# Patient Record
Sex: FEMALE | Race: BLACK OR AFRICAN AMERICAN | Employment: UNEMPLOYED | URBAN - METROPOLITAN AREA
[De-identification: names, ages, dates, MRNs, and addresses within clinical notes are randomized per-mention and may not be internally consistent; named-entity substitution may affect disease eponyms.]

---

## 2017-01-03 ENCOUNTER — ANTI-COAG VISIT (OUTPATIENT)
Dept: PHARMACY | Age: 37
End: 2017-01-03

## 2017-01-03 DIAGNOSIS — I26.99 PULMONARY EMBOLISM, OTHER: ICD-10-CM

## 2017-01-03 LAB
INR BLD: 1.3
PROTIME: 16 SECONDS

## 2017-01-03 RX ORDER — NITROFURANTOIN 25; 75 MG/1; MG/1
100 CAPSULE ORAL DAILY
COMMUNITY
End: 2019-02-21 | Stop reason: ALTCHOICE

## 2017-01-12 ENCOUNTER — TELEPHONE (OUTPATIENT)
Dept: PHARMACY | Age: 37
End: 2017-01-12

## 2017-01-16 ENCOUNTER — ANTI-COAG VISIT (OUTPATIENT)
Dept: PHARMACY | Age: 37
End: 2017-01-16

## 2017-01-16 DIAGNOSIS — I26.99 PULMONARY EMBOLISM, OTHER: ICD-10-CM

## 2017-01-16 LAB — INR BLD: 1.4

## 2017-01-27 ENCOUNTER — ANTI-COAG VISIT (OUTPATIENT)
Dept: PHARMACY | Age: 37
End: 2017-01-27

## 2017-01-27 LAB — INR BLD: 1.3

## 2017-01-31 ENCOUNTER — TELEPHONE (OUTPATIENT)
Dept: PHARMACY | Age: 37
End: 2017-01-31

## 2017-02-02 ENCOUNTER — ANTI-COAG VISIT (OUTPATIENT)
Dept: PHARMACY | Age: 37
End: 2017-02-02

## 2017-02-02 DIAGNOSIS — I26.99 OTHER PULMONARY EMBOLISM WITHOUT ACUTE COR PULMONALE, UNSPECIFIED CHRONICITY (HCC): ICD-10-CM

## 2017-02-02 LAB — INR BLD: 2.6

## 2017-02-15 ENCOUNTER — ANTI-COAG VISIT (OUTPATIENT)
Dept: PHARMACY | Age: 37
End: 2017-02-15

## 2017-02-15 DIAGNOSIS — I26.99 OTHER PULMONARY EMBOLISM WITHOUT ACUTE COR PULMONALE, UNSPECIFIED CHRONICITY (HCC): ICD-10-CM

## 2017-02-15 LAB
INR BLD: 1.1
PROTIME: 12.6 SECONDS

## 2017-02-16 ENCOUNTER — TELEPHONE (OUTPATIENT)
Dept: PHARMACY | Age: 37
End: 2017-02-16

## 2017-02-20 ENCOUNTER — ANTI-COAG VISIT (OUTPATIENT)
Dept: PHARMACY | Age: 37
End: 2017-02-20

## 2017-02-20 DIAGNOSIS — I26.99 OTHER PULMONARY EMBOLISM WITHOUT ACUTE COR PULMONALE, UNSPECIFIED CHRONICITY (HCC): ICD-10-CM

## 2017-02-20 LAB — INR BLD: 1.2

## 2017-03-03 ENCOUNTER — TELEPHONE (OUTPATIENT)
Dept: PHARMACY | Age: 37
End: 2017-03-03

## 2017-03-06 ENCOUNTER — TELEPHONE (OUTPATIENT)
Dept: PHARMACY | Age: 37
End: 2017-03-06

## 2017-03-06 RX ORDER — LINEZOLID 600 MG/1
600 TABLET, FILM COATED ORAL 2 TIMES DAILY
COMMUNITY
End: 2022-05-03

## 2017-03-07 ENCOUNTER — TELEPHONE (OUTPATIENT)
Dept: PHARMACY | Age: 37
End: 2017-03-07

## 2017-03-13 ENCOUNTER — ANTI-COAG VISIT (OUTPATIENT)
Dept: PHARMACY | Age: 37
End: 2017-03-13

## 2017-03-13 DIAGNOSIS — I26.99 OTHER PULMONARY EMBOLISM WITHOUT ACUTE COR PULMONALE, UNSPECIFIED CHRONICITY (HCC): ICD-10-CM

## 2017-03-13 LAB — INR BLD: 2

## 2017-03-23 ENCOUNTER — ANTI-COAG VISIT (OUTPATIENT)
Dept: PHARMACY | Age: 37
End: 2017-03-23

## 2017-03-23 ENCOUNTER — TELEPHONE (OUTPATIENT)
Dept: PHARMACY | Age: 37
End: 2017-03-23

## 2017-03-23 DIAGNOSIS — I26.99 OTHER PULMONARY EMBOLISM WITHOUT ACUTE COR PULMONALE, UNSPECIFIED CHRONICITY (HCC): ICD-10-CM

## 2017-03-23 LAB — INR BLD: 1.6

## 2017-03-27 ENCOUNTER — TELEPHONE (OUTPATIENT)
Dept: PHARMACY | Age: 37
End: 2017-03-27

## 2017-03-30 ENCOUNTER — TELEPHONE (OUTPATIENT)
Dept: PHARMACY | Age: 37
End: 2017-03-30

## 2017-04-19 ENCOUNTER — TELEPHONE (OUTPATIENT)
Dept: PHARMACY | Age: 37
End: 2017-04-19

## 2017-04-20 ENCOUNTER — TELEPHONE (OUTPATIENT)
Dept: PHARMACY | Age: 37
End: 2017-04-20

## 2017-04-24 ENCOUNTER — ANTI-COAG VISIT (OUTPATIENT)
Dept: PHARMACY | Age: 37
End: 2017-04-24

## 2017-04-24 DIAGNOSIS — I26.99 OTHER PULMONARY EMBOLISM WITHOUT ACUTE COR PULMONALE, UNSPECIFIED CHRONICITY (HCC): ICD-10-CM

## 2017-04-24 LAB — INR BLD: 1.5

## 2017-05-04 ENCOUNTER — ANTI-COAG VISIT (OUTPATIENT)
Dept: PHARMACY | Age: 37
End: 2017-05-04

## 2017-05-04 DIAGNOSIS — I26.99 OTHER PULMONARY EMBOLISM WITHOUT ACUTE COR PULMONALE, UNSPECIFIED CHRONICITY (HCC): ICD-10-CM

## 2017-05-04 LAB
INR BLD: 5.1
PROTIME: 61.6 SECONDS

## 2017-05-12 ENCOUNTER — ANTI-COAG VISIT (OUTPATIENT)
Dept: PHARMACY | Age: 37
End: 2017-05-12

## 2017-05-12 DIAGNOSIS — I26.99 OTHER PULMONARY EMBOLISM WITHOUT ACUTE COR PULMONALE, UNSPECIFIED CHRONICITY (HCC): ICD-10-CM

## 2017-05-17 ENCOUNTER — ANTI-COAG VISIT (OUTPATIENT)
Dept: PHARMACY | Age: 37
End: 2017-05-17

## 2017-05-17 DIAGNOSIS — I26.99 OTHER PULMONARY EMBOLISM WITHOUT ACUTE COR PULMONALE, UNSPECIFIED CHRONICITY (HCC): ICD-10-CM

## 2017-05-17 LAB
INR BLD: 2.1
PROTIME: 25.1 SECONDS

## 2017-06-12 ENCOUNTER — ANTI-COAG VISIT (OUTPATIENT)
Dept: PHARMACY | Age: 37
End: 2017-06-12

## 2017-06-12 DIAGNOSIS — I26.99 OTHER PULMONARY EMBOLISM WITHOUT ACUTE COR PULMONALE, UNSPECIFIED CHRONICITY (HCC): ICD-10-CM

## 2017-06-12 LAB — INR BLD: 2.5

## 2017-07-25 ENCOUNTER — TELEPHONE (OUTPATIENT)
Dept: PHARMACY | Age: 37
End: 2017-07-25

## 2017-08-03 ENCOUNTER — ANTI-COAG VISIT (OUTPATIENT)
Dept: PHARMACY | Age: 37
End: 2017-08-03

## 2017-08-03 DIAGNOSIS — I26.99 OTHER PULMONARY EMBOLISM WITHOUT ACUTE COR PULMONALE, UNSPECIFIED CHRONICITY (HCC): ICD-10-CM

## 2017-08-03 LAB
INR BLD: 4.8
PROTIME: 57.2 SECONDS

## 2017-08-14 ENCOUNTER — ANTI-COAG VISIT (OUTPATIENT)
Dept: PHARMACY | Age: 37
End: 2017-08-14

## 2017-08-14 DIAGNOSIS — I26.99 OTHER PULMONARY EMBOLISM WITHOUT ACUTE COR PULMONALE, UNSPECIFIED CHRONICITY (HCC): ICD-10-CM

## 2017-08-14 LAB — INR BLD: 3.8

## 2017-08-28 ENCOUNTER — TELEPHONE (OUTPATIENT)
Dept: PHARMACY | Age: 37
End: 2017-08-28

## 2017-08-30 ENCOUNTER — TELEPHONE (OUTPATIENT)
Dept: PHARMACY | Age: 37
End: 2017-08-30

## 2017-08-31 ENCOUNTER — ANTI-COAG VISIT (OUTPATIENT)
Dept: PHARMACY | Age: 37
End: 2017-08-31

## 2017-08-31 DIAGNOSIS — I26.99 OTHER PULMONARY EMBOLISM WITHOUT ACUTE COR PULMONALE, UNSPECIFIED CHRONICITY (HCC): ICD-10-CM

## 2017-08-31 LAB
INR BLD: 2.1
PROTIME: 25.1 SECONDS

## 2017-09-21 ENCOUNTER — TELEPHONE (OUTPATIENT)
Dept: PHARMACY | Age: 37
End: 2017-09-21

## 2017-09-22 ENCOUNTER — ANTI-COAG VISIT (OUTPATIENT)
Dept: PHARMACY | Age: 37
End: 2017-09-22

## 2017-09-22 LAB
INR BLD: 1.4
PROTIME: 16.4 SECONDS

## 2017-09-26 ENCOUNTER — TELEPHONE (OUTPATIENT)
Dept: PHARMACY | Age: 37
End: 2017-09-26

## 2017-10-02 ENCOUNTER — ANTI-COAG VISIT (OUTPATIENT)
Dept: PHARMACY | Age: 37
End: 2017-10-02

## 2017-10-02 LAB
INR BLD: 1.8
PROTIME: 21.6 SECONDS

## 2017-10-02 NOTE — MR AVS SNAPSHOT
After Visit Summary             Anastasiya Barron   10/2/2017 4:30 PM   Anti-coag visit    Description:  Female : 1980   Provider:  MAK Camargo [facility]   Department:  [address]/[employer]              Your Follow-Up and Future Appointments         Below is a list of your follow-up and future appointments. This may not be a complete list as you may have made appointments directly with providers that we are not aware of or your providers may have made some for you. Please call your providers to confirm appointments. It is important to keep your appointments. Please bring your current insurance card, photo ID, co-pay, and all medication bottles to your appointment. If self-pay, payment is expected at the time of service. Your To-Do List     Future Appointments Provider Department Dept Phone    10/16/2017 9:45 AM [facility] [phone]         Information from Your Visit        Department     Name Address Phone Fax    [address]/[facility] [facility] Sidra Blancas [address] [facility] [phone]      Anticoagulation Summary as of 10/2/2017              Today's INR 1. 8! Next INR check 10/16/2017      Description           Take 3.75 mg tonight then continue weekly dose of 2.5 mg (1 tablet)  and 3.75mg (1&1/2 tablets) all other days.       Vital Signs     Last Menstrual Period Smoking Status                2017 Never Smoker             Medications and Orders      Your Current Medications Are              linezolid (ZYVOX) 600 MG tablet Take 600 mg by mouth 2 times daily    nitrofurantoin, macrocrystal-monohydrate, (MACROBID) 100 MG capsule Take 100 mg by mouth daily    warfarin (COUMADIN) 2.5 MG tablet Take 2.5 mg by mouth See Admin Instructions Unsure of dose sequence    Mirabegron ER (MYRBETRIQ) 25 MG TB24 Take 1 tablet by mouth daily solifenacin (VESICARE) 5 MG tablet Take 5 mg by mouth daily    trospium (SANCTURA) 20 MG tablet Take 20 mg by mouth 2 times daily    cetirizine (ZYRTEC) 10 MG tablet Take 10 mg by mouth daily. oxyCODONE-acetaminophen (PERCOCET) 5-325 MG per tablet Take 1 tablet by mouth every 6 hours as needed. omeprazole (PRILOSEC) 20 MG capsule Take 20 mg by mouth daily. alendronate (FOSAMAX) 35 MG tablet Take 35 mg by mouth every 7 days. baclofen (LIORESAL) 10 MG tablet Take 10 mg by mouth 3 times daily. calcium-vitamin D (OSCAL-500) 500-200 MG-UNIT per tablet Take 1 tablet by mouth daily. ferrous sulfate 325 (65 FE) MG tablet Take 325 mg by mouth daily (with breakfast). famotidine (PEPCID) 20 MG tablet Take 40 mg by mouth daily     potassium chloride SA (K-DUR;KLOR-CON) 20 MEQ tablet Take 20 mEq by mouth daily     therapeutic multivitamin-minerals (THERAGRAN-M) tablet Take 1 tablet by mouth daily. warfarin (COUMADIN) 5 MG tablet Take 5 mg by mouth See Admin Instructions. 7.5mg on Mon and 5mg all other days       Allergies              Ambien [Zolpidem Tartrate]     Ampicillin     Bactrim     Morphine     Risperidone And Related     Vicodin [Hydrocodone-acetaminophen]     Zoloft       We Ordered/Performed the following           Protime-INR     Comments: This external order was created through the results console.          Result Summary for Protime-INR      Result Information     Status          Final result (Resulted: 10/2/2017  4:22 PM)           10/2/2017  4:22 PM      Component Results     Component Value Ref Range & Units Status    INR 1.8  Final    Protime 21.6 seconds Final               Additional Information        Basic Information     Date Of Birth Sex Race Ethnicity Preferred Language    1980 Female Black Non-/Non  English      Preventive Care        Date Due    HIV screening is recommended for all people regardless of risk factors aged 15-65 years at least once (lifetime) who have never been HIV tested. 9/27/1997    Tetanus Combination Vaccine (1 - Tdap) 9/27/2001    Pneumococcal Vaccine - Pneumovax for adults aged 19-64 years with: chronic heart disease, chronic lung disease, diabetes mellitus, alcoholism, chronic liver disease, or cigarette smoking. (1 of 1 - PPSV23) 9/27/2001    Pap Smear 9/27/2003    Yearly Flu Vaccine (1) 9/1/2017            MyChart Signup           [employer] allows you to send messages to your doctor, view your test results, renew your prescriptions, schedule appointments, view visit notes, and more. How Do I Sign Up? 1. In your Internet browser, go to [email or web address]  2. Click on the Sign Up Now link in the Sign In box. You will see the New Member Sign Up page. 3. Enter your [employer] Access Code exactly as it appears below. You will not need to use this code after youve completed the sign-up process. If you do not sign up before the expiration date, you must request a new code. [employer] Access Code: [email or web address]  Expires: 10/2/2017  4:34 PM    4. Enter your Social Security Number ([id number]) and Date of Birth (mm/dd/yyyy) as indicated and click Submit. You will be taken to the next sign-up page. 5. Create a [employer] ID. This will be your [employer] login ID and cannot be changed, so think of one that is secure and easy to remember. 6. Create a [employer] password. You can change your password at any time. 7. Enter your Password Reset Question and Answer. This can be used at a later time if you forget your password. 8. Enter your e-mail address. You will receive e-mail notification when new information is available in [address] 9. Click Sign Up. You can now view your medical record. Additional Information  If you have questions, please contact the physician practice where you receive care. Remember, [employer] is NOT to be used for urgent needs. For medical emergencies, dial 911. For questions regarding your [employer] account call [phone]. If you have a clinical question, please call your doctor's office. October 2017 Details    Anh Diaz Thu Fri Sat     1               2      3.75 mg   See details      3      3.75 mg         4      3.75 mg         5      3.75 mg         6      3.75 mg         7      3.75 mg           8      3.75 mg         9      2.5 mg         10      3.75 mg         11      3.75 mg         12      3.75 mg         13      3.75 mg         14      3.75 mg           15      3.75 mg         16      2.5 mg         17               18               19               20               21                 22               23               24               25               26               27               28                 29               30               31                    Date Details   10/02 This INR check       Date of next INR:  10/16/2017         How to take your warfarin dose              To take:  2.5 mg Take 1 of the 2.5 mg tablets. To take:  3.75 mg Take 1.5 of the 2.5 mg tablets.

## 2017-10-02 NOTE — PROGRESS NOTES
Ms. Yousif Frost is a 30 y.o. y/o female with history of DVT, PE in about 2010 who presents today for anticoagulation monitoring and adjustment. Pertinent PMH: DVT assoc with PICC line  Hx of multiple sclerosis,   Patient Reported Findings:  Yes     No  [x]   []       Patient verifies current dosing regimen as listed  [x]   []       S/S bleeding/bruising/swelling/SOB- Pt not feeling well   []   [x]       Blood in urine or stool   []   [x]       Procedures scheduled in the future at this time  []   [x]       Missed Dose  []   [x]       Extra Dose  [x]   []       Change in medications - was on Cipro twice daily when left , but has since completed therapy  []   [x]       Change in health/diet/appetite     []   [x]       Change in alcohol use  []   [x]       Change in activity  [x]   []       Hospital admission- was then transferred to  from [facility] d/t MD being at [facility]. Was released from hospital 9/24  [x]   []       Emergency department visit- was taken to the ER on 9/22 d/t complaints of abdominal pain at last visit. Unable to complete visit with [facility] clinic  []   [x]       Other complaints    Clinical Outcomes:  Yes     No  []   [x]       Major bleeding event  []   [x]       Thromboembolic event    Duration of warfarin Therapy: Indefinitely  INR Range:  2.0-3.0    INR 1.8 today  Since patient slightly subtherapeutic and scheduled to have lower dose today, take 3.75 mg tomorrow then continue weekly dose of 2.5 mg on Mon and 3.75 mg all other days.    Return to clinic in 2 weeks, 10/16    Referring PCP is Dr. Pramod Barfield results  INR (no units)   Date Value   10/02/2017 1.8   09/22/2017 1.40 (H)   09/22/2017 1.4   08/31/2017 2.1

## 2017-10-19 ENCOUNTER — TELEPHONE (OUTPATIENT)
Dept: PHARMACY | Age: 37
End: 2017-10-19

## 2017-10-20 ENCOUNTER — ANTI-COAG VISIT (OUTPATIENT)
Dept: PHARMACY | Age: 37
End: 2017-10-20

## 2017-10-20 LAB
INR BLD: 1.9
PROTIME: 22.4 SECONDS

## 2017-10-23 ENCOUNTER — TELEPHONE (OUTPATIENT)
Dept: PHARMACY | Age: 37
End: 2017-10-23

## 2017-10-23 NOTE — TELEPHONE ENCOUNTER
López Krystianas returned call. Stated that she was also prescribed med for pain. After checking chart again, patient was prescribed Pyridium. No interaction with warfarin. Explained to patient. She stated she was prescribed steroid pack but will only take if she has a reaction to Cipro since unsure if allergic. Advised patient that steroids will affect INR. If needs to take steroid, please call back to clinic and we will doses as needed. Patient verbalized understanding.

## 2017-10-31 ENCOUNTER — TELEPHONE (OUTPATIENT)
Dept: PHARMACY | Age: 37
End: 2017-10-31

## 2017-10-31 NOTE — TELEPHONE ENCOUNTER
----- Message from Memo Patricia sent at 10/31/2017  3:11 PM EDT -----  Contact: Patient  Please call patient @ [phone] she has been given a prescription for Diflucan, one tablet and was told it would interfere with her warfarin.

## 2017-11-01 ENCOUNTER — HOSPITAL ENCOUNTER (OUTPATIENT)
Dept: OTHER | Age: 37
Discharge: HOME | End: 2017-11-30

## 2017-11-13 ENCOUNTER — ANTI-COAG VISIT (OUTPATIENT)
Dept: PHARMACY | Age: 37
End: 2017-11-13

## 2017-11-13 LAB
INR BLD: 1.4
PROTIME: 16.4 SECONDS

## 2017-11-21 ENCOUNTER — TELEPHONE (OUTPATIENT)
Dept: PHARMACY | Age: 37
End: 2017-11-21

## 2017-11-21 NOTE — TELEPHONE ENCOUNTER
Aide called to cancel patient's appt. Requested a call back to reschedule. L/m for pt to call and reschedule.

## 2017-11-28 ENCOUNTER — TELEPHONE (OUTPATIENT)
Dept: PHARMACY | Age: 37
End: 2017-11-28

## 2017-11-28 NOTE — TELEPHONE ENCOUNTER
Patient was seen in the ED on 11/24. INR was subtherapeutic at 1.38. Spoke with caregiver Norberto Oleas today to dose patient until her next appointment which has been rescheduled for 12/4 (next Monday). Patient is to take 5 mg today and tomorrow only. Then resume normal dose of 3.75 mg daily except 2.5 mg Mon.      Rosa Paz PharmD, [facility].   PGY1 Pharmacy Resident

## 2017-12-01 ENCOUNTER — HOSPITAL ENCOUNTER (OUTPATIENT)
Dept: OTHER | Age: 37
Discharge: HOME | End: 2017-12-31

## 2017-12-04 ENCOUNTER — ANTI-COAG VISIT (OUTPATIENT)
Dept: PHARMACY | Age: 37
End: 2017-12-04

## 2017-12-04 LAB
INR BLD: 1.3
PROTIME: 15.1 SECONDS

## 2017-12-04 NOTE — PROGRESS NOTES
Ms. Kris Vides is a 30 y.o. y/o female with history of DVT, PE in about 2010 who presents today for anticoagulation monitoring and adjustment. Pertinent PMH: DVT assoc with PICC line  Hx of multiple sclerosis,   Patient Reported Findings:  Yes     No  [x]   []       Patient verifies current dosing regimen as listed  []   [x]       S/S bleeding/bruising/swelling/SOB  []   [x]       Blood in urine or stool   []   [x]       Procedures scheduled in the future at this time  []   [x]       Missed Dose  []   [x]       Extra Dose  [x]   []       Change in medications Currently finishing Macrobid  []   [x]       Change in health/diet/appetite   Had some broccoli for multiple days recently. Reminded the need for consistency in her diet. []   [x]       Change in alcohol use  []   [x]       Change in activity  []   [x]       Hospital admission  [x]   []       Emergency department visit on 11/24 for a UTI. INR was 1.38. She was instructed from this clinic to take 5mg x days then resume same weekly schedule and recheck the next week.    []   [x]       Other complaints     Clinical Outcomes:  Yes     No  []   [x]       Major bleeding event  []   [x]       Thromboembolic event    Duration of warfarin Therapy: Indefinitely  INR Range:  2.0-3.0    INR 1.3 today d/t broccoli for multiple days in a row  Take 5 mg today and tomorrow then increase weekly dose to 3.75mg daily(5% increase)  Return to clinic 9 days     Referring PCP is Dr. Leila White results  INR (no units)   Date Value   12/04/2017 1.3   11/24/2017 1.38 (H)   11/13/2017 1.4   10/20/2017 1.9

## 2017-12-13 ENCOUNTER — ANTI-COAG VISIT (OUTPATIENT)
Dept: PHARMACY | Age: 37
End: 2017-12-13

## 2017-12-13 DIAGNOSIS — I26.09 OTHER PULMONARY EMBOLISM WITH ACUTE COR PULMONALE, UNSPECIFIED CHRONICITY (HCC): ICD-10-CM

## 2017-12-13 LAB
INR BLD: 1.4
PROTIME: 17.2 SECONDS

## 2017-12-13 NOTE — PROGRESS NOTES
Ms. Natasha Mujica is a 30 y.o. y/o female with history of DVT, PE in about 2010 who presents today for anticoagulation monitoring and adjustment. Pertinent PMH: DVT assoc with PICC line  Hx of multiple sclerosis,   Patient Reported Findings:  Yes     No  [x]   []       Patient verifies current dosing regimen as listed  []   [x]       S/S bleeding/bruising/swelling/SOB  []   [x]       Blood in urine or stool   []   [x]       Procedures scheduled in the future at this time  []   [x]       Missed Dose  []   [x]       Extra Dose  []   [x]       Change in medications   []   [x]       Change in health/diet/appetite- Mixed veggies and corn everyday. []   [x]       Change in alcohol use  []   [x]       Change in activity  []   [x]       Hospital admission  []   [x]       Emergency department visit   []   [x]       Other complaints      Clinical Outcomes:  Yes     No  []   [x]       Major bleeding event  []   [x]       Thromboembolic event    Duration of warfarin Therapy: Indefinitely  INR Range:  2.0-3.0    INR 1.4 today   Take 5 mg today and tomorrow then continue weekly dose of 3.75mg daily. Patient has been supratherapuetic on doses higher than this as well as on this dose. Hesitant to change dose at this appointment d/t history. Will bolus for two days and evaluate in 9 days.     Referring PCP is Dr. Suraj Plaza results  INR (no units)   Date Value   12/04/2017 1.3   11/24/2017 1.38 (H)   11/13/2017 1.4   10/20/2017 1.9

## 2017-12-19 NOTE — TELEPHONE ENCOUNTER
Warfarin prescription phoned into [facility] [address] to [address] under Dr. Adams Kussmaul  Warfarin 2.5 mg tabs  Take 3.75mg daily  14 days   No refills

## 2017-12-22 ENCOUNTER — TELEPHONE (OUTPATIENT)
Dept: PHARMACY | Age: 37
End: 2017-12-22

## 2017-12-26 ENCOUNTER — ANTI-COAG VISIT (OUTPATIENT)
Dept: PHARMACY | Age: 37
End: 2017-12-26

## 2017-12-26 DIAGNOSIS — I26.09 OTHER PULMONARY EMBOLISM WITH ACUTE COR PULMONALE, UNSPECIFIED CHRONICITY (HCC): ICD-10-CM

## 2017-12-26 LAB
INR BLD: 1.3
PROTIME: 15.2 SECONDS

## 2018-01-01 ENCOUNTER — HOSPITAL ENCOUNTER (OUTPATIENT)
Dept: OTHER | Age: 38
Discharge: HOME | End: 2018-01-31

## 2018-01-02 ENCOUNTER — ANTI-COAG VISIT (OUTPATIENT)
Dept: PHARMACY | Age: 38
End: 2018-01-02

## 2018-01-02 DIAGNOSIS — I26.09 OTHER PULMONARY EMBOLISM WITH ACUTE COR PULMONALE, UNSPECIFIED CHRONICITY (HCC): ICD-10-CM

## 2018-01-02 LAB
INR BLD: 1.1
PROTIME: 13.1 SECONDS

## 2018-01-02 NOTE — PROGRESS NOTES
Ms. Steph Fierro is a 30 y.o. y/o female with history of DVT, PE in about 2010 who presents today for anticoagulation monitoring and adjustment. Pertinent PMH: DVT assoc with PICC line  Hx of multiple sclerosis,   Patient Reported Findings:  Yes     No  [x]   []       Patient verifies current dosing regimen as listed  []   [x]       S/S bleeding/bruising/swelling/SOB  []   [x]       Blood in urine or stool   []   [x]       Procedures scheduled in the future at this time  []   [x]       Missed Dose  []   [x]       Extra Dose  []   [x]       Change in medications   []   [x]       Change in health/diet/appetite- Mixed veggies and corn everyday. []   [x]       Change in alcohol use  []   [x]       Change in activity  []   [x]       Hospital admission  []   [x]       Emergency department visit   []   [x]       Other complaints      Clinical Outcomes:  Yes     No  []   [x]       Major bleeding event  []   [x]       Thromboembolic event    Duration of warfarin Therapy: Indefinitely  INR Range:  2.0-3.0     BP 95/65  out of right arm     INR 1.1 today adamantly denies missing doses  Since patient subtherapeutic, patient will need to begin Lovenox injections until therapeutic. Patient extremely noncompliant to clinic. Take 5 mg x 3 days then continue weekly dose of 3.75mg daily. Patient has been supratherapuetic on doses higher than this as well as on this dose. Hesitant to change dose at this appointment d/t history. Will bolus for 3 days.  Recheck INR on Mon 1/8    Referring PCP is Dr. Hiral Rodriguez results  INR (no units)   Date Value   01/02/2018 1.1   12/26/2017 1.3   12/13/2017 1.4   12/04/2017 1.3

## 2018-01-03 ENCOUNTER — TELEPHONE (OUTPATIENT)
Dept: PHARMACY | Age: 38
End: 2018-01-03

## 2018-01-10 ENCOUNTER — ANTI-COAG VISIT (OUTPATIENT)
Dept: PHARMACY | Age: 38
End: 2018-01-10

## 2018-01-10 DIAGNOSIS — I26.09 OTHER PULMONARY EMBOLISM WITH ACUTE COR PULMONALE, UNSPECIFIED CHRONICITY (HCC): ICD-10-CM

## 2018-01-10 LAB
INR BLD: 1.7
PROTIME: 20.2 SECONDS

## 2018-01-10 NOTE — PROGRESS NOTES
Ms. Ivonne Leal is a 30 y.o. y/o female with history of DVT, PE in about 2010 who presents today for anticoagulation monitoring and adjustment. Pertinent PMH: DVT assoc with PICC line  Hx of multiple sclerosis,   Patient Reported Findings:  Yes     No  [x]   []       Patient verifies current dosing regimen as listed  []   [x]       S/S bleeding/bruising/swelling/SOB  []   [x]       Blood in urine or stool   []   [x]       Procedures scheduled in the future at this time  []   [x]       Missed Dose  []   [x]       Extra Dose  []   [x]       Change in medications   []   [x]       Change in health/diet/appetite- Mixed veggies and peas 6 out of 7 days per week  []   [x]       Change in alcohol use  []   [x]       Change in activity  []   [x]       Hospital admission  []   [x]       Emergency department visit   []   [x]       Other complaints      Clinical Outcomes:  Yes     No  []   [x]       Major bleeding event  []   [x]       Thromboembolic event    Duration of warfarin Therapy: Indefinitely  INR Range:  2.0-3.0     BP 95/65  out of right arm     INR 1.7 today   Since patient subtherapeutic, patient will continue Lovenox injections until therapeutic. Take 5mg today then increase weekly dose to 5mg on Sat and 3.75mg all other days  Patient hs been extremely noncompliant to clinic. Patient has been supratherapuetic on doses higher than this as well as on this dose. Will cautiously change dose at this appointment.    Recheck INR in 5 days    Referring PCP is Dr. Ekta Marion results  INR (no units)   Date Value   01/10/2018 1.7   01/02/2018 1.1   12/26/2017 1.3   12/13/2017 1.4

## 2018-01-16 ENCOUNTER — TELEPHONE (OUTPATIENT)
Dept: PHARMACY | Age: 38
End: 2018-01-16

## 2018-01-18 ENCOUNTER — ANTI-COAG VISIT (OUTPATIENT)
Dept: PHARMACY | Age: 38
End: 2018-01-18

## 2018-01-18 DIAGNOSIS — I26.09 OTHER PULMONARY EMBOLISM WITH ACUTE COR PULMONALE, UNSPECIFIED CHRONICITY (HCC): ICD-10-CM

## 2018-01-18 LAB
INR BLD: 1
PROTIME: 11.5 SECONDS

## 2018-01-30 NOTE — TELEPHONE ENCOUNTER
Patient called in AM & left a vm stating she had to see another MD, urgently and forgot to call and reschedule her appt from [facility]. 1/29. Called patient back to reschedule. She states she wants to see her ID MD 1st and will call us back this afternoon to reschedule her missed appt.

## 2018-02-01 ENCOUNTER — HOSPITAL ENCOUNTER (OUTPATIENT)
Dept: OTHER | Age: 38
Discharge: HOME | End: 2018-02-28

## 2018-02-01 NOTE — TELEPHONE ENCOUNTER
Called patient again about an appt. She did not call back on Monday as she said she would. Patient states they found a severe UTI that will require IV ABX and the doctor wanted her to go to the ED for admission to the hospital.  Her urologist wants to set up the ABX at home. She again states she will call with the decision on the ABX tomorrow and then she will know if she can schedule an appt with this clinic.

## 2018-02-05 ENCOUNTER — ANTI-COAG VISIT (OUTPATIENT)
Dept: PHARMACY | Age: 38
End: 2018-02-05

## 2018-02-05 DIAGNOSIS — I26.09 OTHER PULMONARY EMBOLISM WITH ACUTE COR PULMONALE, UNSPECIFIED CHRONICITY (HCC): ICD-10-CM

## 2018-02-05 LAB
INR BLD: 2.2
PROTIME: 26.6 SECONDS

## 2018-02-06 ENCOUNTER — TELEPHONE (OUTPATIENT)
Dept: PHARMACY | Age: 38
End: 2018-02-06

## 2018-02-26 ENCOUNTER — TELEPHONE (OUTPATIENT)
Dept: PHARMACY | Age: 38
End: 2018-02-26

## 2018-02-27 NOTE — TELEPHONE ENCOUNTER
Called patient. She scheduled an appt for Fri. 3/2. She will call back and schedule something earlier if she can.

## 2018-03-01 ENCOUNTER — HOSPITAL ENCOUNTER (OUTPATIENT)
Dept: OTHER | Age: 38
Discharge: HOME | End: 2018-03-31

## 2018-03-06 NOTE — TELEPHONE ENCOUNTER
Pt left v/m apologizing for missing appts, she stated her aide Philip Pedersen ) is unable to bring her to appt at this time d/t having car problems. Pt states we can call Pallavi Stock at [phone] if we need to speak w/ her to r/s appt, her car should be ready Thursday or Friday of this week . Silvia [phone].

## 2018-03-09 ENCOUNTER — ANTI-COAG VISIT (OUTPATIENT)
Dept: PHARMACY | Age: 38
End: 2018-03-09

## 2018-03-09 DIAGNOSIS — I26.09 OTHER PULMONARY EMBOLISM WITH ACUTE COR PULMONALE, UNSPECIFIED CHRONICITY (HCC): ICD-10-CM

## 2018-03-09 LAB
INR BLD: 1.9
PROTIME: 23.4 SECONDS

## 2018-03-09 NOTE — PROGRESS NOTES
Ms. Nasir Onofre is a 30 y.o. y/o female with history of DVT, PE in about 2010. She presents today for anticoagulation monitoring and adjustment. Pertinent PMH: DVT assoc with PICC line  Hx of multiple sclerosis,   Patient Reported Findings:  Yes     No  [x]   []       Patient verifies current dosing regimen as listed  []   [x]       S/S bleeding/bruising/swelling/SOB  []   [x]       Blood in urine or stool   []   [x]       Procedures scheduled in the future at this time  []   [x]       Missed Dose  []   [x]       Extra Dose  []   [x]       Change in medications   []   [x]       Change in health/diet/appetite  []   [x]       Change in alcohol use  []   [x]       Change in activity  []   [x]       Hospital admission  []   [x]       Emergency department visit   [x]   []       Other complaints   She c/o indigestion but caregiver suggests d/t anxiety. Clinical Outcomes:  Yes     No  []   [x]       Major bleeding event  []   [x]       Thromboembolic event    Duration of warfarin Therapy: Indefinitely  INR Range:  2.0-3.0     She did not keep previous appointment scheduled for 2 weeks after last appointment d/t ride problems.  She states that she \"didn't have any rides\"    INR 1.9 today   Continue weekly dose to 5mg on Sat and 3.75mg all other days  Recheck INR in 2 weeks    Referring PCP is Dr. Colt Juan results  INR (no units)   Date Value   03/09/2018 1.9   02/05/2018 2.2   01/18/2018 1   01/10/2018 1.7

## 2018-03-29 ENCOUNTER — TELEPHONE (OUTPATIENT)
Dept: PHARMACY | Age: 38
End: 2018-03-29

## 2018-04-01 ENCOUNTER — HOSPITAL ENCOUNTER (OUTPATIENT)
Dept: OTHER | Age: 38
Discharge: HOME | End: 2018-04-30

## 2018-04-06 ENCOUNTER — ANTI-COAG VISIT (OUTPATIENT)
Dept: PHARMACY | Age: 38
End: 2018-04-06

## 2018-04-06 DIAGNOSIS — I26.09 OTHER PULMONARY EMBOLISM WITH ACUTE COR PULMONALE, UNSPECIFIED CHRONICITY (HCC): ICD-10-CM

## 2018-04-06 LAB
INR BLD: 1.1
PROTIME: 13.3 SECONDS

## 2018-04-12 ENCOUNTER — TELEPHONE (OUTPATIENT)
Dept: PAIN MANAGEMENT | Age: 38
End: 2018-04-12

## 2018-04-20 ENCOUNTER — ANTI-COAG VISIT (OUTPATIENT)
Dept: PHARMACY | Age: 38
End: 2018-04-20

## 2018-04-20 DIAGNOSIS — I26.09 OTHER PULMONARY EMBOLISM WITH ACUTE COR PULMONALE, UNSPECIFIED CHRONICITY (HCC): ICD-10-CM

## 2018-04-20 LAB
INR BLD: 1.3
PROTIME: 15.4 SECONDS

## 2018-05-01 ENCOUNTER — HOSPITAL ENCOUNTER (OUTPATIENT)
Dept: OTHER | Age: 38
Discharge: HOME | End: 2018-05-31

## 2018-05-11 ENCOUNTER — TELEPHONE (OUTPATIENT)
Dept: PHARMACY | Age: 38
End: 2018-05-11

## 2018-05-23 ENCOUNTER — ANTI-COAG VISIT (OUTPATIENT)
Dept: PHARMACY | Age: 38
End: 2018-05-23

## 2018-05-23 DIAGNOSIS — I26.09 OTHER PULMONARY EMBOLISM WITH ACUTE COR PULMONALE, UNSPECIFIED CHRONICITY (HCC): ICD-10-CM

## 2018-05-23 LAB
INR BLD: 3.3
PROTIME: 39.7 SECONDS

## 2018-06-01 ENCOUNTER — HOSPITAL ENCOUNTER (OUTPATIENT)
Dept: OTHER | Age: 38
Discharge: HOME | End: 2018-06-30

## 2018-06-11 ENCOUNTER — ANTI-COAG VISIT (OUTPATIENT)
Dept: PHARMACY | Age: 38
End: 2018-06-11

## 2018-06-11 DIAGNOSIS — I26.09 OTHER PULMONARY EMBOLISM WITH ACUTE COR PULMONALE, UNSPECIFIED CHRONICITY (HCC): ICD-10-CM

## 2018-06-11 LAB
INR BLD: 3
PROTIME: 36.1 SECONDS

## 2018-07-01 ENCOUNTER — HOSPITAL ENCOUNTER (OUTPATIENT)
Dept: OTHER | Age: 38
Discharge: HOME | End: 2018-07-31

## 2018-07-03 ENCOUNTER — TELEPHONE (OUTPATIENT)
Dept: PHARMACY | Age: 38
End: 2018-07-03

## 2018-07-06 NOTE — TELEPHONE ENCOUNTER
Pts Aide called she would like us to fax a letter stating she has brought pt to [address] [facility] for [employer]. Will fax information on Monday.

## 2018-07-16 ENCOUNTER — ANTI-COAG VISIT (OUTPATIENT)
Dept: PHARMACY | Age: 38
End: 2018-07-16

## 2018-07-16 DIAGNOSIS — I26.09 OTHER PULMONARY EMBOLISM WITH ACUTE COR PULMONALE, UNSPECIFIED CHRONICITY (HCC): ICD-10-CM

## 2018-07-16 LAB
INR BLD: 1.9
PROTIME: 22.6 SECONDS

## 2018-07-30 ENCOUNTER — ANTI-COAG VISIT (OUTPATIENT)
Dept: PHARMACY | Age: 38
End: 2018-07-30

## 2018-07-30 VITALS — HEART RATE: 116 BPM | SYSTOLIC BLOOD PRESSURE: 93 MMHG | DIASTOLIC BLOOD PRESSURE: 66 MMHG

## 2018-07-30 DIAGNOSIS — I26.09 OTHER PULMONARY EMBOLISM WITH ACUTE COR PULMONALE, UNSPECIFIED CHRONICITY (HCC): ICD-10-CM

## 2018-07-30 LAB
INR BLD: 3.7
PROTIME: 44.3 SECONDS

## 2018-07-30 NOTE — PROGRESS NOTES
Ms. Radha Villalobos is a 30 y.o. y/o female with history of DVT, PE in about 2010. She presents today for anticoagulation monitoring and adjustment. Pertinent PMH: DVT assoc with PICC line  Hx of multiple sclerosis,   Patient Reported Findings:  Yes     No  [x]   []       Patient verifies current dosing regimen as listed  []   [x]       S/S bleeding/bruising/swelling/SOB  []   [x]       Blood in urine or stool   []   [x]       Procedures scheduled in the future at this time  []   [x]       Missed Dose  []   [x]       Extra Dose  []   [x]       Change in medications   []   [x]       Change in health/diet/appetite She states her appetite is back but had been light on vegetables since she has not been to the grocery since her hospital discharge on 7/11  []   [x]       Change in alcohol use   []   [x]       Change in activity  [x]   []       Hospital admission Patient was in [facility], was in the ICU and had pneumonia. 6/24-7/11 and was on a ventilator  []   [x]       Emergency department visit   []   [x]       Other complaints     Clinical Outcomes:  Yes     No  []   [x]       Major bleeding event  []   [x]       Thromboembolic event    Duration of warfarin Therapy: Indefinitely  INR Range:  2.0-3.0     She presents today in a wheel chair accompanied by a care giver    INR 3.7 today   Hold dose tonight only then continue weekly dose of 5mg on Sat and 3.75mg all other days  She agrees to return to her normal vegetable/salad diet, going to the grocery tonight. Encouraged a slight increase in vegetables then maintain a consistency of vegetables/salads.   Recheck INR in 2 weeks on 8/13    Referring PCP is Dr. Lanette Lennox results  INR (no units)   Date Value   07/30/2018 3.7   07/16/2018 1.9   06/11/2018 3   05/23/2018 3.3

## 2018-08-01 ENCOUNTER — HOSPITAL ENCOUNTER (OUTPATIENT)
Dept: OTHER | Age: 38
Discharge: HOME | End: 2018-08-31

## 2018-08-13 ENCOUNTER — ANTI-COAG VISIT (OUTPATIENT)
Dept: PHARMACY | Age: 38
End: 2018-08-13

## 2018-08-13 DIAGNOSIS — I26.09 OTHER PULMONARY EMBOLISM WITH ACUTE COR PULMONALE, UNSPECIFIED CHRONICITY (HCC): ICD-10-CM

## 2018-08-13 LAB
INR BLD: 5.27 (ref 0.86–1.14)
PROTHROMBIN TIME: 60.1 SEC (ref 9.8–13)

## 2018-08-13 NOTE — PROGRESS NOTES
Was in ER 8/3, no med changes. States that she has been eating cabbage  Ms. Susan Mckeon is a 30 y.o. y/o female with history of DVT, PE in about 2010. She presents today for anticoagulation monitoring and adjustment. Pertinent PMH: DVT assoc with PICC line  Hx of multiple sclerosis,   Patient Reported Findings:  Yes     No  [x]   []       Patient verifies current dosing regimen as listed  []   [x]       S/S bleeding/bruising/swelling/SOB  []   [x]       Blood in urine or stool   [x]   []       Procedures scheduled in the future at this time states that she has a procedure on 8/29, is unsure if will have to hold warfarin or not. Will call MD tomorrow then will let clinic know  []   [x]       Missed Dose  []   [x]       Extra Dose  []   [x]       Change in medications   []   [x]       Change in health/diet/appetite   []   [x]       Change in alcohol use   []   [x]       Change in activity  [x]   []       Hospital admission  []   [x]       Emergency department visit   []   [x]       Other complaints     Clinical Outcomes:  Yes     No  []   [x]       Major bleeding event  []   [x]       Thromboembolic event    Duration of warfarin Therapy: Indefinitely  INR Range:  2.0-3.0     She presents today in a wheel chair accompanied by a care giver    INR 5.27 today   INR >8, required lab draw  Patient states that nothing has changed and she has been taking what was instructed of warfarin. Hold dose tonight and tomorrow then decrease weekly dose to 3.75 mg daily (4.5% dec)  Encouraged a slight increase in vegetables then maintain a consistency of vegetables/salads.   Recheck INR in 2 weeks on 8/27    Referring PCP is Dr. Sonu Polo results  INR (no units)   Date Value   07/30/2018 3.7   07/16/2018 1.9   06/11/2018 3   05/23/2018 3.3

## 2018-08-15 ENCOUNTER — TELEPHONE (OUTPATIENT)
Dept: PHARMACY | Age: 38
End: 2018-08-15

## 2018-08-15 NOTE — TELEPHONE ENCOUNTER
Pt called [facility] to confirm she was having surgery on 8/29 and was to hold warfarin for 5 days prior per Dr Paul Alvarenga @   [phone]. Pt did mention Lovenox injections but was unsure of instructions and who would be calling in the prescription. Explained that I would call surgeons office for instructions. Will place pool message for Hitesh Yao ( pharmacist) she spoke to pt regarding surgery at last [facility] visit. Spoke to Bladimir Monterroso at [facility] surgery department she will send message to Dr Mauro Serrano and get back w/ us on instructions needed prior to surgery , also will find out if pharmacist is to dose and call in lovenox .

## 2018-08-16 ENCOUNTER — TELEPHONE (OUTPATIENT)
Dept: PHARMACY | Age: 38
End: 2018-08-16

## 2018-08-16 NOTE — TELEPHONE ENCOUNTER
pt called to let you know her surgery is scheduled on 8/29 , she is to hold warfarin 5 days prior. Pt is to have Lovenox injections but was unsure if MD was calling in and dosing or if he wanted pharmacist to do this. Called [facility] surgery department spoke to Stacy Alvarenga , she will send message to Dr Leopold Pass asking about lovenox injections and to confirm all information needed prior to surgery. Explained to pt that we would keep [facility] appt on 8/24 until she hears back from you. [phone] Ashley Hutchisonist) [phone] ( Dr Talia Pruitt).      Returned call to [facility]. States that he has not discussed patient with MD yet.  He will discuss with Dr. Leopold Pass tomorrow when in clinic then return call to clinic tomorrow (8/17)

## 2018-08-16 NOTE — TELEPHONE ENCOUNTER
Andre Tipton returned call to clinic. Stated that Dr. Marrion Romberg wants patient to hold warfarin and be bridged for 5 days. Pt's renal function fine, no dose adjustment necessary.  Will discuss with pt when RTC 8/24

## 2018-08-16 NOTE — TELEPHONE ENCOUNTER
----- Message from Laverne Kawasaki sent at 8/15/2018  1:28 PM EDT -----  Contact: Sheryl Rodas RN from Dr CRU[facility] office returned my call about pts Lovenox injections. Could you call him back at [phone]. Ask me about this first before you call him back .      Thanks,  Ernesto Decker

## 2018-08-24 ENCOUNTER — ANTI-COAG VISIT (OUTPATIENT)
Dept: PHARMACY | Age: 38
End: 2018-08-24

## 2018-08-24 DIAGNOSIS — I26.09 OTHER PULMONARY EMBOLISM WITH ACUTE COR PULMONALE, UNSPECIFIED CHRONICITY (HCC): ICD-10-CM

## 2018-08-24 LAB
INR BLD: 6.9
PROTIME: 82.3 SECONDS

## 2018-08-29 ENCOUNTER — ANTI-COAG VISIT (OUTPATIENT)
Dept: PHARMACY | Age: 38
End: 2018-08-29

## 2018-08-29 DIAGNOSIS — I26.09 OTHER PULMONARY EMBOLISM WITH ACUTE COR PULMONALE, UNSPECIFIED CHRONICITY (HCC): ICD-10-CM

## 2018-08-29 LAB
INR BLD: 1.7
PROTIME: 20.7 SECONDS

## 2018-09-01 ENCOUNTER — HOSPITAL ENCOUNTER (OUTPATIENT)
Dept: OTHER | Age: 38
Discharge: HOME | End: 2018-09-01

## 2018-09-07 ENCOUNTER — ANTI-COAG VISIT (OUTPATIENT)
Dept: PHARMACY | Age: 38
End: 2018-09-07

## 2018-09-07 DIAGNOSIS — I26.09 OTHER PULMONARY EMBOLISM WITH ACUTE COR PULMONALE, UNSPECIFIED CHRONICITY (HCC): ICD-10-CM

## 2018-09-07 LAB
INR BLD: 2.7
PROTIME: 32.7 SECONDS

## 2018-09-14 NOTE — TELEPHONE ENCOUNTER
Warfarin prescription phoned in and LM on VM to [address] [phone] under Dr. Lala Juárez  Warfarin 2.5 mg tabs  Take 2.5 mg on Mon and Fri and 3.75 mg all other days of the week  (72 tablets) 60 days   3 refills

## 2018-09-21 ENCOUNTER — ANTI-COAG VISIT (OUTPATIENT)
Dept: PHARMACY | Age: 38
End: 2018-09-21

## 2018-09-21 DIAGNOSIS — I26.09 OTHER PULMONARY EMBOLISM WITH ACUTE COR PULMONALE, UNSPECIFIED CHRONICITY (HCC): ICD-10-CM

## 2018-09-21 LAB
INR BLD: 2.2
PROTIME: 25.9 SECONDS

## 2018-09-21 NOTE — PROGRESS NOTES
Ms. Virgen Patel is a 30 y.o. y/o female with history of DVT, PE in about 2010. She presents today for anticoagulation monitoring and adjustment. Pertinent PMH: DVT assoc with PICC line  Hx of multiple sclerosis,   Patient Reported Findings:  Yes     No  [x]   []       Patient verifies current dosing regimen as listed  []   [x]       S/S bleeding/bruising/swelling/SOB  []   [x]       Blood in urine or stool   [x]   []       Procedures scheduled in the future at this time  Was scheduled for a procedure on 8/29 and was instructed to bridge and hold warfarin 5 days. Procedure was rescheduled for 10/10  []   [x]       Missed Dose  []   [x]       Extra Dose  []   [x]       Change in medications states that she took a few percocet. States that she has been taking 2 tablets (500 mg) every other day, can inc INR slightly but not to extent currently INR  []   [x]       Change in health/diet/appetite will increased weekly vegetables of green beans or broccoli to 2-3 times a week. []   [x]       Change in alcohol use   []   [x]       Change in activity  []   [x]       Hospital admission  []   [x]       Emergency department visit   []   [x]       Other complaints     Clinical Outcomes:  Yes     No  []   [x]       Major bleeding event  []   [x]       Thromboembolic event    Duration of warfarin Therapy: Indefinitely  INR Range:  2.0-3.0     She presents today in a wheel chair accompanied by a care giver    INR is 2.2 today   Continue weekly dose of 2.5 mg on Mon & Fri and 3.75 mg all other days of the week   Hold warfarin beginning 10/5 until procedure on 10/10. Restart warfarin taking 5 mg x 2 days then return to weekly. See below for bridging instructions  Encouraged to maintain a consistency of vegetables/salads. Recheck INR on in 3 weeks, 10/15, after procedure.   Need late afternoon appontments    Referring PCP is Dr. Ivelisse Murray results  INR (no units)   Date Value   09/21/2018 2.2   09/07/2018 2.7

## 2018-10-03 ENCOUNTER — TELEPHONE (OUTPATIENT)
Dept: PHARMACY | Age: 38
End: 2018-10-03

## 2018-10-09 ENCOUNTER — TELEPHONE (OUTPATIENT)
Dept: PHARMACY | Age: 38
End: 2018-10-09

## 2018-10-09 NOTE — TELEPHONE ENCOUNTER
Returned call. Verified that surgery was cancelled. Verified that she returned to weekly dose as instructed. Will maintain appt in clinic 10/15.

## 2018-10-09 NOTE — TELEPHONE ENCOUNTER
----- Message from Nathan Shen sent at 10/9/2018  7:52 AM EDT -----  Contact: Saad Baird  Pt left v/m stating they cancelled her surgery , she wanted to let pharmacist know she started taking her regular warfarin dose.  Please call Saad Baird she would like to speak w/ a pharmacist.  [phone]

## 2018-10-15 ENCOUNTER — TELEPHONE (OUTPATIENT)
Dept: PHARMACY | Age: 38
End: 2018-10-15

## 2018-10-22 ENCOUNTER — TELEPHONE (OUTPATIENT)
Dept: PHARMACY | Age: 38
End: 2018-10-22

## 2018-10-22 NOTE — TELEPHONE ENCOUNTER
Pt called to R/S cancelled appt . Cancelled on 10/15 d/t admission into [facility] DR LIZZ ANDERSON. R/S on 10/25. Upcoming procedure scheduled on 11/14/18.

## 2018-10-25 ENCOUNTER — ANTI-COAG VISIT (OUTPATIENT)
Dept: PHARMACY | Age: 38
End: 2018-10-25
Payer: MEDICAID

## 2018-10-25 DIAGNOSIS — I26.09 OTHER PULMONARY EMBOLISM WITH ACUTE COR PULMONALE, UNSPECIFIED CHRONICITY (HCC): ICD-10-CM

## 2018-10-25 LAB — INTERNATIONAL NORMALIZATION RATIO, POC: 3

## 2018-10-25 PROCEDURE — 85610 PROTHROMBIN TIME: CPT

## 2018-10-25 PROCEDURE — 99212 OFFICE O/P EST SF 10 MIN: CPT

## 2018-10-25 NOTE — PROGRESS NOTES
Ms. Katie Basilio is a 41 y.o. y/o female with history of DVT, PE in about 2010. She presents today for anticoagulation monitoring and adjustment. Pertinent PMH: DVT assoc with PICC line  Hx of multiple sclerosis,   Patient Reported Findings:  Yes     No  [x]   []       Patient verifies current dosing regimen as listed  []   [x]       S/S bleeding/bruising/swelling/SOB  []   [x]       Blood in urine or stool   [x]   []       Procedures scheduled in the future at this time  Was scheduled for a procedure on 8/29 and was instructed to bridge and hold warfarin 5 days. Procedure was rescheduled for 11/14  []   [x]       Missed Dose  []   [x]       Extra Dose  []   [x]       Change in medications taking cipro and vanco  []   [x]       Change in health/diet/appetite will increased weekly vegetables of green beans or broccoli to 2-3 times a week. []   [x]       Change in alcohol use   []   [x]       Change in activity  []   [x]       Hospital admission 10/11-10/15 for UTI so original procedure was rescheduled. []   [x]       Emergency department visit   []   [x]       Other complaints     Clinical Outcomes:  Yes     No  []   [x]       Major bleeding event  []   [x]       Thromboembolic event    Duration of warfarin Therapy: Indefinitely  INR Range:  2.0-3.0     She presents today in a wheel chair accompanied by a care giver    INR is 3 today   Continue weekly dose of 2.5 mg on Mon & Fri and 3.75 mg all other days of the week   Hold warfarin starting on 11/9 until procedure on 11/14. Restart warfarin taking 5 mg x2 days then return to weekly dose. See below for bridging instructions  Encouraged to maintain a consistency of vegetables/salads. Recheck INR on in 3 weeks, 11/16, after procedure.   Need late afternoon appontments    Referring PCP is Dr. Erika Dominguez results  INR (no units)   Date Value   10/25/2018 3   09/21/2018 2.2   09/07/2018 2.7   08/29/2018 1.7   08/24/2018 6.9     Day  Date Warfarin Dose Lovenox Dose    5 days before 11/9/2018 Stop warfarin  8 AM: No Lovenox  8 PM: No Lovenox    4 days before  11/10/2018 No warfarin   8 AM: No Lovenox  8 PM: 50 mg injection   3 days before  11/11/2018 No warfarin   8 AM: 50 mg injection  8 PM: 50 mg injection   2 days before  11/12/2018 No warfarin   8 AM: 50 mg injection  8 PM: 50 mg injection   1 day before  11/13/2018 No Warfarin    8 AM: 50 mg injection  8 PM: NO LOVENOX   Day of procedure 11/14/2018 Take 5 mg of warfarin in the evening 8 AM: NO LOVENOX  8 PM: 50 mg injection   1 day after  11/15/2018 Take  5 mg of warfarin in the evening 8 AM: 50 mg injection  8 PM: 50 mg injection   2 days after  11/16/2018 Take 2.5 mg of warfarin in the evening 8 AM: 50 mg injection  8 PM: 50 mg injection

## 2018-11-12 NOTE — TELEPHONE ENCOUNTER
Lovenox prescription phoned into [facility] [phone] under Dr. Joo Reyna  Lovenox 60 mg syringes  Inject 50 mg BID AD  10 syringes  1 refill

## 2018-11-15 ENCOUNTER — TELEPHONE (OUTPATIENT)
Dept: PHARMACY | Age: 38
End: 2018-11-15

## 2018-11-20 ENCOUNTER — ANTI-COAG VISIT (OUTPATIENT)
Dept: PHARMACY | Age: 38
End: 2018-11-20
Payer: MEDICAID

## 2018-11-20 DIAGNOSIS — I26.09 OTHER PULMONARY EMBOLISM WITH ACUTE COR PULMONALE, UNSPECIFIED CHRONICITY (HCC): ICD-10-CM

## 2018-11-20 LAB — INTERNATIONAL NORMALIZATION RATIO, POC: 2

## 2018-11-20 PROCEDURE — 85610 PROTHROMBIN TIME: CPT

## 2018-11-20 PROCEDURE — 99211 OFF/OP EST MAY X REQ PHY/QHP: CPT

## 2018-12-06 ENCOUNTER — ANTI-COAG VISIT (OUTPATIENT)
Dept: PHARMACY | Age: 38
End: 2018-12-06
Payer: MEDICAID

## 2018-12-06 DIAGNOSIS — I26.09 OTHER PULMONARY EMBOLISM WITH ACUTE COR PULMONALE, UNSPECIFIED CHRONICITY (HCC): ICD-10-CM

## 2018-12-06 LAB — INTERNATIONAL NORMALIZATION RATIO, POC: 1.8

## 2018-12-06 PROCEDURE — 99211 OFF/OP EST MAY X REQ PHY/QHP: CPT

## 2018-12-06 PROCEDURE — 85610 PROTHROMBIN TIME: CPT

## 2018-12-06 NOTE — PROGRESS NOTES
Ms. Ismael Zuleta is a 41 y.o. y/o female with history of DVT, PE in about 2010. She presents today for anticoagulation monitoring and adjustment. Pertinent PMH: DVT assoc with PICC line  Hx of multiple sclerosis,   Patient Reported Findings:  Yes     No  [x]   []       Patient verifies current dosing regimen as listed  []   [x]       S/S bleeding/bruising/swelling/SOB  []   [x]       Blood in urine or stool   [x]   []       Procedures scheduled in the future at this time  Scheduled for another procedure on jan 25, will likely need to hold warfarin and bridge   []   [x]       Missed Dose  []   [x]       Extra Dose  [x]   []       Change in medications vanco infusions, denies any other med changes  [x]   []       Change in health/diet/appetite will increased weekly vegetables of green beans or broccoli to 2-3 times a week   States that she is still having diarrhea on and off   []   [x]       Change in alcohol use   []   [x]       Change in activity  []   [x]       Hospital admission   []   [x]       Emergency department visit   []   [x]       Other complaints     Clinical Outcomes:  Yes     No  []   [x]       Major bleeding event  []   [x]       Thromboembolic event    Duration of warfarin Therapy: Indefinitely  INR Range:  2.0-3.0     She presents today in a wheel chair accompanied by a care giver    INR is 1.8 today   Take 5 mg tonight then continue weekly dose of 2.5 mg on Mon & Fri and 3.75 mg all other days of the week   Encouraged to maintain a consistency of vegetables/salads.   Recheck INR on in 2 weeks, 12/28  Need late afternoon appontments    Referring PCP is Dr. Jacobo Pham results  INR (no units)   Date Value   12/06/2018 1.8   11/20/2018 2   10/25/2018 3   09/21/2018 2.2   09/07/2018 2.7   08/29/2018 1.7   08/24/2018 6.9

## 2018-12-17 ENCOUNTER — TELEPHONE (OUTPATIENT)
Dept: PHARMACY | Age: 38
End: 2018-12-17

## 2018-12-21 ENCOUNTER — TELEPHONE (OUTPATIENT)
Dept: PHARMACY | Age: 38
End: 2018-12-21

## 2018-12-28 ENCOUNTER — ANTI-COAG VISIT (OUTPATIENT)
Dept: PHARMACY | Age: 38
End: 2018-12-28
Payer: MEDICAID

## 2018-12-28 DIAGNOSIS — I26.09 OTHER PULMONARY EMBOLISM WITH ACUTE COR PULMONALE, UNSPECIFIED CHRONICITY (HCC): ICD-10-CM

## 2018-12-28 LAB — INTERNATIONAL NORMALIZATION RATIO, POC: 1.4

## 2018-12-28 PROCEDURE — 85610 PROTHROMBIN TIME: CPT

## 2018-12-28 PROCEDURE — 99212 OFFICE O/P EST SF 10 MIN: CPT

## 2019-01-11 ENCOUNTER — ANTI-COAG VISIT (OUTPATIENT)
Dept: PHARMACY | Age: 39
End: 2019-01-11
Payer: MEDICAID

## 2019-01-11 DIAGNOSIS — I26.09 OTHER PULMONARY EMBOLISM WITH ACUTE COR PULMONALE, UNSPECIFIED CHRONICITY (HCC): ICD-10-CM

## 2019-01-11 LAB — INTERNATIONAL NORMALIZATION RATIO, POC: 1.6

## 2019-01-11 PROCEDURE — 99212 OFFICE O/P EST SF 10 MIN: CPT

## 2019-01-11 PROCEDURE — 85610 PROTHROMBIN TIME: CPT

## 2019-01-25 ENCOUNTER — TELEPHONE (OUTPATIENT)
Dept: PHARMACY | Age: 39
End: 2019-01-25

## 2019-01-31 ENCOUNTER — ANTI-COAG VISIT (OUTPATIENT)
Dept: PHARMACY | Age: 39
End: 2019-01-31
Payer: MEDICAID

## 2019-01-31 DIAGNOSIS — I26.09 OTHER PULMONARY EMBOLISM WITH ACUTE COR PULMONALE, UNSPECIFIED CHRONICITY (HCC): ICD-10-CM

## 2019-01-31 LAB — INTERNATIONAL NORMALIZATION RATIO, POC: 1.9

## 2019-01-31 PROCEDURE — 99211 OFF/OP EST MAY X REQ PHY/QHP: CPT

## 2019-01-31 PROCEDURE — 85610 PROTHROMBIN TIME: CPT

## 2019-02-21 ENCOUNTER — ANTI-COAG VISIT (OUTPATIENT)
Dept: PHARMACY | Age: 39
End: 2019-02-21
Payer: MEDICAID

## 2019-02-21 DIAGNOSIS — I26.09 OTHER PULMONARY EMBOLISM WITH ACUTE COR PULMONALE, UNSPECIFIED CHRONICITY (HCC): ICD-10-CM

## 2019-02-21 LAB — INTERNATIONAL NORMALIZATION RATIO, POC: 1.3

## 2019-02-21 PROCEDURE — 85610 PROTHROMBIN TIME: CPT

## 2019-02-21 PROCEDURE — 99212 OFFICE O/P EST SF 10 MIN: CPT

## 2019-03-04 ENCOUNTER — ANTI-COAG VISIT (OUTPATIENT)
Dept: PHARMACY | Age: 39
End: 2019-03-04
Payer: MEDICAID

## 2019-03-04 DIAGNOSIS — I26.09 OTHER PULMONARY EMBOLISM WITH ACUTE COR PULMONALE, UNSPECIFIED CHRONICITY (HCC): ICD-10-CM

## 2019-03-04 LAB — INTERNATIONAL NORMALIZATION RATIO, POC: 1.1

## 2019-03-04 PROCEDURE — 99212 OFFICE O/P EST SF 10 MIN: CPT

## 2019-03-04 PROCEDURE — 85610 PROTHROMBIN TIME: CPT

## 2019-03-08 ENCOUNTER — ANTI-COAG VISIT (OUTPATIENT)
Dept: PHARMACY | Age: 39
End: 2019-03-08
Payer: MEDICAID

## 2019-03-08 DIAGNOSIS — I26.09 OTHER PULMONARY EMBOLISM WITH ACUTE COR PULMONALE, UNSPECIFIED CHRONICITY (HCC): ICD-10-CM

## 2019-03-08 LAB — INTERNATIONAL NORMALIZATION RATIO, POC: 1.4

## 2019-03-08 PROCEDURE — 85610 PROTHROMBIN TIME: CPT

## 2019-03-08 PROCEDURE — 99211 OFF/OP EST MAY X REQ PHY/QHP: CPT

## 2019-03-11 ENCOUNTER — ANTI-COAG VISIT (OUTPATIENT)
Dept: PHARMACY | Age: 39
End: 2019-03-11
Payer: MEDICAID

## 2019-03-11 DIAGNOSIS — I26.09 OTHER PULMONARY EMBOLISM WITH ACUTE COR PULMONALE, UNSPECIFIED CHRONICITY (HCC): ICD-10-CM

## 2019-03-11 LAB — INTERNATIONAL NORMALIZATION RATIO, POC: 1.2

## 2019-03-11 PROCEDURE — 99211 OFF/OP EST MAY X REQ PHY/QHP: CPT

## 2019-03-11 PROCEDURE — 85610 PROTHROMBIN TIME: CPT

## 2019-03-14 ENCOUNTER — ANTI-COAG VISIT (OUTPATIENT)
Dept: PHARMACY | Age: 39
End: 2019-03-14
Payer: MEDICAID

## 2019-03-14 DIAGNOSIS — I26.09 OTHER PULMONARY EMBOLISM WITH ACUTE COR PULMONALE, UNSPECIFIED CHRONICITY (HCC): ICD-10-CM

## 2019-03-14 LAB — INTERNATIONAL NORMALIZATION RATIO, POC: 2.5

## 2019-03-14 PROCEDURE — 99211 OFF/OP EST MAY X REQ PHY/QHP: CPT

## 2019-03-14 PROCEDURE — 85610 PROTHROMBIN TIME: CPT

## 2019-03-28 ENCOUNTER — ANTI-COAG VISIT (OUTPATIENT)
Dept: PHARMACY | Age: 39
End: 2019-03-28
Payer: MEDICAID

## 2019-03-28 DIAGNOSIS — I26.09 OTHER PULMONARY EMBOLISM WITH ACUTE COR PULMONALE, UNSPECIFIED CHRONICITY (HCC): ICD-10-CM

## 2019-03-28 LAB — INTERNATIONAL NORMALIZATION RATIO, POC: 1.2

## 2019-03-28 PROCEDURE — 85610 PROTHROMBIN TIME: CPT

## 2019-03-28 PROCEDURE — 99212 OFFICE O/P EST SF 10 MIN: CPT

## 2019-04-11 ENCOUNTER — ANTI-COAG VISIT (OUTPATIENT)
Dept: PHARMACY | Age: 39
End: 2019-04-11
Payer: MEDICAID

## 2019-04-11 DIAGNOSIS — I26.09 OTHER PULMONARY EMBOLISM WITH ACUTE COR PULMONALE, UNSPECIFIED CHRONICITY (HCC): ICD-10-CM

## 2019-04-11 LAB — INTERNATIONAL NORMALIZATION RATIO, POC: 1.4

## 2019-04-11 PROCEDURE — 85610 PROTHROMBIN TIME: CPT

## 2019-04-11 PROCEDURE — 99211 OFF/OP EST MAY X REQ PHY/QHP: CPT

## 2019-04-11 RX ORDER — SIMETHICONE 80 MG
80 TABLET,CHEWABLE ORAL DAILY PRN
COMMUNITY
Start: 2019-04-10

## 2019-04-11 NOTE — PROGRESS NOTES
Was given script for macrobid x 1 week, no interaction    Ms. Dallas Emery is a 41 y.o. y/o female with history of DVT, PE in about 2010. She presents today for anticoagulation monitoring and adjustment. Pertinent PMH: DVT assoc with PICC line  Hx of multiple sclerosis,   Patient Reported Findings:  Yes     No  [x]   []       Patient verifies current dosing regimen as listed  []   [x]       S/S bleeding/bruising/swelling/SOB  []   [x]       Blood in urine or stool   []   [x]       Procedures scheduled in the future at this time -  []   [x]       Missed Dose   []   [x]       Extra Dose  [x]   []       Change in medications Was given script for macrobid x 1 week, no interaction  []   [x]       Change in health/diet/appetite Normal vegetable is about twice a week and generally a low vitamin K. When she has broccoli it would be in a stir truong. May have a salad only once a month.  []   [x]       Change in alcohol use   []   [x]       Change in activity  []   [x]       Hospital admission   []   [x]       Emergency department visit   []   [x]       Other complaints     Clinical Outcomes:  Yes     No  []   [x]       Major bleeding event  []   [x]       Thromboembolic event    Duration of warfarin Therapy: Indefinitely  INR Range:  2.0-3.0     She presents today in a wheel chair accompanied by a care giver. She continues to recover from hand surgery. INR is 1.4 today  Since subtherapeutic again despite inc weekly dose 10% last visit, increase weekly dose to 5 mg daily (16% inc)  Encouraged to maintain a consistency of vegetables/salads.   Recheck INR in 10 days, 4/23    Referring PCP is Dr. Heike Wylie results  INR (no units)   Date Value   04/11/2019 1.4   03/28/2019 1.2   03/14/2019 2.5   03/11/2019 1.2   09/21/2018 2.2   09/07/2018 2.7   08/29/2018 1.7   08/24/2018 6.9

## 2019-04-12 NOTE — TELEPHONE ENCOUNTER
Warfarin prescription phoned in and LM on VM to [address] [phone]   under Dr. Dorsie Peabody ([phone])  Warfarin 2.5 mg tabs  Take 5 mg daily  (180 tablets) 90 days   3 refills

## 2019-04-25 ENCOUNTER — TELEPHONE (OUTPATIENT)
Dept: PHARMACY | Age: 39
End: 2019-04-25

## 2019-04-25 NOTE — TELEPHONE ENCOUNTER
Patient called to change her appt date/time. Wants Fri or Mon between 9:30-9:45. Also states there was a problem with her warfarin refill @ [facility]. Checked her file and she had a 90 day refill called into [facility] on 4/12. I called patient back and she rescheduled for Monday @ 9:30a. Explained that her refill was called into [facility] and she will need to check with them to see if it was already picked up. Patient will call back if needed.

## 2019-04-29 ENCOUNTER — TELEPHONE (OUTPATIENT)
Dept: PHARMACY | Age: 39
End: 2019-04-29

## 2019-04-29 ENCOUNTER — ANTI-COAG VISIT (OUTPATIENT)
Dept: PHARMACY | Age: 39
End: 2019-04-29
Payer: MEDICAID

## 2019-04-29 DIAGNOSIS — I26.09 OTHER PULMONARY EMBOLISM WITH ACUTE COR PULMONALE, UNSPECIFIED CHRONICITY (HCC): ICD-10-CM

## 2019-04-29 LAB — INTERNATIONAL NORMALIZATION RATIO, POC: 1.1

## 2019-04-29 PROCEDURE — 99212 OFFICE O/P EST SF 10 MIN: CPT

## 2019-04-29 PROCEDURE — 85610 PROTHROMBIN TIME: CPT

## 2019-04-29 NOTE — TELEPHONE ENCOUNTER
----- Message from Mago Alvarez sent at 4/29/2019  4:13 PM EDT -----  Contact: Tony Gitelman called to let you know MD wants her to start  Flagyl and have INR checked in 4 days. Pt was having her nails done when she called so she did not know the dose. Confirmed she would be starting it today. If you need to speak w/ [place] please call .  Scheduled appt in [facility] on 5/3

## 2019-04-29 NOTE — PROGRESS NOTES
Ms. Jack Ureña is a 41 y.o. y/o female with history of DVT, PE in about 2010. She presents today for anticoagulation monitoring and adjustment. Pertinent PMH: DVT assoc with PICC line  Hx of multiple sclerosis,   Patient Reported Findings:  Yes     No  [x]   []       Patient verifies current dosing regimen as listed  []   [x]       S/S bleeding/bruising/swelling/SOB  []   [x]       Blood in urine or stool   []   [x]       Procedures scheduled in the future at this time -  [x]   []       Missed Dose states that she missed 2 doses last week wed and thurs   []   [x]       Extra Dose  [x]   []       Change in medications Was given script for diflucan 4/18 x 1 week, has since finished. But might be on another course    []   [x]       Change in health/diet/appetite Normal vegetable is about twice a week and generally a low vitamin K. When she has broccoli it would be in a stir truong. May have a salad only once a month.  []   [x]       Change in alcohol use   []   [x]       Change in activity  []   [x]       Hospital admission   []   [x]       Emergency department visit   []   [x]       Other complaints     Clinical Outcomes:  Yes     No  []   [x]       Major bleeding event  []   [x]       Thromboembolic event    Duration of warfarin Therapy: Indefinitely  INR Range:  2.0-3.0     INR is 1.1 today from missed doses   Take 7.5 mg tonight and tomorrow then continue weekly dose of 5 mg daily   Encouraged to maintain a consistency of vegetables/salads.   Recheck INR in 10 days, 5/9    Referring PCP is Dr. Penelope Park results  INR (no units)   Date Value   04/29/2019 1.1   04/11/2019 1.4   03/28/2019 1.2   03/14/2019 2.5   09/21/2018 2.2   09/07/2018 2.7   08/29/2018 1.7   08/24/2018 6.9

## 2019-05-03 ENCOUNTER — ANTI-COAG VISIT (OUTPATIENT)
Dept: PHARMACY | Age: 39
End: 2019-05-03
Payer: MEDICAID

## 2019-05-03 DIAGNOSIS — I26.09 OTHER PULMONARY EMBOLISM WITH ACUTE COR PULMONALE, UNSPECIFIED CHRONICITY (HCC): ICD-10-CM

## 2019-05-03 LAB — INTERNATIONAL NORMALIZATION RATIO, POC: 1.4

## 2019-05-03 PROCEDURE — 85610 PROTHROMBIN TIME: CPT

## 2019-05-03 PROCEDURE — 99211 OFF/OP EST MAY X REQ PHY/QHP: CPT

## 2019-05-13 ENCOUNTER — ANTI-COAG VISIT (OUTPATIENT)
Dept: PHARMACY | Age: 39
End: 2019-05-13
Payer: MEDICAID

## 2019-05-13 DIAGNOSIS — I26.09 OTHER PULMONARY EMBOLISM WITH ACUTE COR PULMONALE, UNSPECIFIED CHRONICITY (HCC): ICD-10-CM

## 2019-05-13 LAB — INTERNATIONAL NORMALIZATION RATIO, POC: 1.9

## 2019-05-13 PROCEDURE — 99211 OFF/OP EST MAY X REQ PHY/QHP: CPT

## 2019-05-13 PROCEDURE — 85610 PROTHROMBIN TIME: CPT

## 2019-05-13 NOTE — PROGRESS NOTES
Ms. Kylee Fierro is a 41 y.o. y/o female with history of DVT, PE in about 2010. She presents today for anticoagulation monitoring and adjustment. Pertinent PMH: DVT assoc with PICC line  Hx of multiple sclerosis,   Patient Reported Findings:  Yes     No  [x]   []       Patient verifies current dosing regimen as listed   []   [x]       S/S bleeding/bruising/swelling/SOB  []   [x]       Blood in urine or stool   []   [x]       Procedures scheduled in the future at this time -  []   [x]       Missed Dose    []   [x]       Extra Dose  [x]   []       Change in medications has finished flagyl   [x]   []       Change in health/diet/appetite Normal vegetable is about twice a week and generally a low vitamin K. When she has broccoli it would be in a stir truong. May have a salad only once a month. --> states that she has not had any vit k since last visit   []   [x]       Change in alcohol use   []   [x]       Change in activity  []   [x]       Hospital admission   []   [x]       Emergency department visit   []   [x]       Other complaints     Clinical Outcomes:  Yes     No  []   [x]       Major bleeding event  []   [x]       Thromboembolic event    Duration of warfarin Therapy: Indefinitely  INR Range:  2.0-3.0     INR is 1.9 today   Since close to therapeutic after boosting and increasing weekly dose, increase weekly dose to 6.25 mg on Mon, Wed and Fri and 5 mg all other days of the week (3.3% inc)  Encouraged to maintain a consistency of vegetables/salads.   Recheck INR in 2 weeks, 5/30    Referring PCP is Dr. Perez Pro results  INR (no units)   Date Value   05/13/2019 1.9   05/03/2019 1.4   04/29/2019 1.1   04/11/2019 1.4   09/21/2018 2.2   09/07/2018 2.7   08/29/2018 1.7   08/24/2018 6.9

## 2019-05-30 ENCOUNTER — ANTI-COAG VISIT (OUTPATIENT)
Dept: PHARMACY | Age: 39
End: 2019-05-30
Payer: MEDICAID

## 2019-05-30 DIAGNOSIS — I26.09 OTHER PULMONARY EMBOLISM WITH ACUTE COR PULMONALE, UNSPECIFIED CHRONICITY (HCC): ICD-10-CM

## 2019-05-30 LAB — INTERNATIONAL NORMALIZATION RATIO, POC: 1.5

## 2019-05-30 PROCEDURE — 99212 OFFICE O/P EST SF 10 MIN: CPT

## 2019-05-30 PROCEDURE — 85610 PROTHROMBIN TIME: CPT

## 2019-05-30 NOTE — PROGRESS NOTES
Ms. Martinez Artortega is a 41 y.o. y/o female with history of DVT, PE in about 2010. She presents today for anticoagulation monitoring and adjustment. Pertinent PMH: DVT assoc with PICC line  Hx of multiple sclerosis,   Patient Reported Findings:  Yes     No  [x]   []       Patient verifies current dosing regimen as listed   []   [x]       S/S bleeding/bruising/swelling/SOB  []   [x]       Blood in urine or stool   []   [x]       Procedures scheduled in the future at this time  []   [x]       Missed Dose    []   [x]       Extra Dose  [x]   []       Change in medications  Started herbal supplement Mauritanian sea rivero, contains multiple different vit likely affecting INR   [x]   []       Change in health/diet/appetite Normal vegetable is about twice a week and generally a low vitamin K. When she has broccoli it would be in a stir truong. May have a salad only once a month. --> states that she has not had any vit k since last visit   []   [x]       Change in alcohol use   []   [x]       Change in activity  []   [x]       Hospital admission   []   [x]       Emergency department visit   []   [x]       Other complaints     Clinical Outcomes:  Yes     No  []   [x]       Major bleeding event  []   [x]       Thromboembolic event    Duration of warfarin Therapy: Indefinitely  INR Range:  2.0-3.0     INR is 1.5 today   Take 7.5 mg tonight then increase weekly dose to 6.25 mg daily (12% inc)  Encouraged to maintain a consistency of vegetables/salads.   Recheck INR in 1 week, 6/7    Referring PCP is Dr. Lori Neville results  INR (no units)   Date Value   05/30/2019 1.5   05/13/2019 1.9   05/03/2019 1.4   04/29/2019 1.1   09/21/2018 2.2   09/07/2018 2.7   08/29/2018 1.7   08/24/2018 6.9

## 2019-06-03 ENCOUNTER — TELEPHONE (OUTPATIENT)
Dept: PHARMACY | Age: 39
End: 2019-06-03

## 2019-06-03 NOTE — TELEPHONE ENCOUNTER
----- Message from Allyson Kraus sent at 6/3/2019  7:15 AM EDT -----  Contact: Patient  Patient starting Cipro 500mg x2 daily x7 days, starting today. Please call re: effect on warfarin. [phone].

## 2019-06-03 NOTE — TELEPHONE ENCOUNTER
Returned call to [facility]. Cipro can inc INR. Instructed her to continue weekly dose as instructed at last visit since INR was subtherapeutic.  Will assess at next appt

## 2019-06-07 ENCOUNTER — ANTI-COAG VISIT (OUTPATIENT)
Dept: PHARMACY | Age: 39
End: 2019-06-07
Payer: MEDICAID

## 2019-06-07 DIAGNOSIS — I26.09 OTHER PULMONARY EMBOLISM WITH ACUTE COR PULMONALE, UNSPECIFIED CHRONICITY (HCC): ICD-10-CM

## 2019-06-07 LAB — INTERNATIONAL NORMALIZATION RATIO, POC: 2.1

## 2019-06-07 PROCEDURE — 85610 PROTHROMBIN TIME: CPT

## 2019-06-07 PROCEDURE — 99211 OFF/OP EST MAY X REQ PHY/QHP: CPT

## 2019-06-20 ENCOUNTER — ANTI-COAG VISIT (OUTPATIENT)
Dept: PHARMACY | Age: 39
End: 2019-06-20
Payer: MEDICAID

## 2019-06-20 LAB — INTERNATIONAL NORMALIZATION RATIO, POC: 2.9

## 2019-06-20 PROCEDURE — 99211 OFF/OP EST MAY X REQ PHY/QHP: CPT

## 2019-06-20 PROCEDURE — 85610 PROTHROMBIN TIME: CPT

## 2019-06-20 NOTE — PROGRESS NOTES
Ms. Taty Walden is a 41 y.o. y/o female with history of DVT, PE in about 2010. She presents today for anticoagulation monitoring and adjustment. Pertinent PMH: DVT assoc with PICC line  Hx of multiple sclerosis,   Patient Reported Findings:  Yes     No  [x]   []       Patient verifies current dosing regimen as listed   []   [x]       S/S bleeding/bruising/swelling/SOB  []   [x]       Blood in urine or stool   []   [x]       Procedures scheduled in the future at this time  []   [x]       Missed Dose    []   [x]       Extra Dose  [x]   []       Change in medications  Started herbal supplement [place] sea rivero, contains multiple different vit likely affecting INR   Started cipro x 7 days on Mon   [x]   []       Change in health/diet/appetite Normal vegetable is about twice a week and generally a low vitamin K. When she has broccoli it would be in a stir truong. May have a salad only once a month. --> states that she has not had any vit k since last visit   []   [x]       Change in alcohol use   []   [x]       Change in activity  []   [x]       Hospital admission   []   [x]       Emergency department visit   []   [x]       Other complaints     Clinical Outcomes:  Yes     No  []   [x]       Major bleeding event  []   [x]       Thromboembolic event    Duration of warfarin Therapy: Indefinitely  INR Range:  2.0-3.0     INR is 2.9 today   Continue weekly dose of 7.5 mg on Mon and Fri and 6.25 mg all other days of the week. Encouraged to maintain a consistency of vegetables/salads.   Recheck INR in 3 weeks, 7/11    Referring PCP is Dr. Pranav Alan results  INR (no units)   Date Value   06/20/2019 2.9   06/07/2019 2.1   05/30/2019 1.5   05/13/2019 1.9   09/21/2018 2.2   09/07/2018 2.7   08/29/2018 1.7   08/24/2018 6.9

## 2019-06-24 ENCOUNTER — TELEPHONE (OUTPATIENT)
Dept: PHARMACY | Age: 39
End: 2019-06-24

## 2019-07-11 ENCOUNTER — ANTI-COAG VISIT (OUTPATIENT)
Dept: PHARMACY | Age: 39
End: 2019-07-11
Payer: MEDICAID

## 2019-07-11 DIAGNOSIS — I26.99 PULMONARY EMBOLISM WITHOUT ACUTE COR PULMONALE, UNSPECIFIED CHRONICITY, UNSPECIFIED PULMONARY EMBOLISM TYPE (HCC): ICD-10-CM

## 2019-07-11 LAB — INTERNATIONAL NORMALIZATION RATIO, POC: 1.7

## 2019-07-11 PROCEDURE — 99211 OFF/OP EST MAY X REQ PHY/QHP: CPT

## 2019-07-11 PROCEDURE — 85610 PROTHROMBIN TIME: CPT

## 2019-07-11 NOTE — PROGRESS NOTES
Ms. Srinivasa Delacruz is a 41 y.o. y/o female with history of DVT, PE in about 2010. She presents today for anticoagulation monitoring and adjustment. Pertinent PMH: DVT assoc with PICC line  Hx of multiple sclerosis,   Patient Reported Findings:  Yes     No  [x]   []       Patient verifies current dosing regimen as listed   []   [x]       S/S bleeding/bruising/swelling/SOB  []   [x]       Blood in urine or stool   []   [x]       Procedures scheduled in the future at this time   [x]   []       Missed Dose yesterday    []   [x]       Extra Dose  [x]   []       Change in medications  Started herbal supplement Amharic sea rivero, contains multiple different vit likely affecting INR   [x]   []       Change in health/diet/appetite Normal vegetable is about twice a week and generally a low vitamin K. When she has broccoli it would be in a stir truong. May have a salad only once a month. --> states that she has had broccoli recently     []   [x]       Change in alcohol use   []   [x]       Change in activity  []   [x]       Hospital admission   [x]   []       Emergency department visit 7/2 for stomach pain, was given GI cocktail, bentyl and Toradol to help. Pt states that helps   [x]   []       Other complaints   C/o on and off cramping in stomach, not burning. Advised to call PCP to discuss as most OTC medications wont help with cramping/pain     Clinical Outcomes:  Yes     No  []   [x]       Major bleeding event  []   [x]       Thromboembolic event    Duration of warfarin Therapy: Indefinitely  INR Range:  2.0-3.0     INR is 1.7 today d/t missed dose yesterday   Continue weekly dose of 7.5 mg on Mon and Fri and 6.25 mg all other days of the week. Encouraged to maintain a consistency of vegetables/salads.   Recheck INR in 2 weeks, 7/25    Referring PCP is Dr. Marlo Umana results  INR (no units)   Date Value   07/11/2019 1.7   06/20/2019 2.9   06/07/2019 2.1   05/30/2019 1.5   09/21/2018 2.2   09/07/2018 2.7

## 2019-07-31 ENCOUNTER — ANTI-COAG VISIT (OUTPATIENT)
Dept: PHARMACY | Age: 39
End: 2019-07-31
Payer: MEDICAID

## 2019-07-31 ENCOUNTER — TELEPHONE (OUTPATIENT)
Dept: PHARMACY | Age: 39
End: 2019-07-31

## 2019-07-31 LAB — INTERNATIONAL NORMALIZATION RATIO, POC: 2.4

## 2019-07-31 PROCEDURE — 99211 OFF/OP EST MAY X REQ PHY/QHP: CPT

## 2019-07-31 PROCEDURE — 85610 PROTHROMBIN TIME: CPT

## 2019-08-29 ENCOUNTER — TELEPHONE (OUTPATIENT)
Dept: PHARMACY | Age: 39
End: 2019-08-29

## 2019-08-29 NOTE — TELEPHONE ENCOUNTER
Pt left v/m asking to r/s [facility] appt. She wanted to come in on Monday 9/2 left v/m letting pt know we are closed on Monday and gave available appts we had for next week. Asked pt to call us back to r/s.

## 2019-09-03 ENCOUNTER — ANTI-COAG VISIT (OUTPATIENT)
Dept: PHARMACY | Age: 39
End: 2019-09-03
Payer: MEDICAID

## 2019-09-03 DIAGNOSIS — I26.99 PULMONARY EMBOLISM WITHOUT ACUTE COR PULMONALE, UNSPECIFIED CHRONICITY, UNSPECIFIED PULMONARY EMBOLISM TYPE (HCC): ICD-10-CM

## 2019-09-03 LAB — INTERNATIONAL NORMALIZATION RATIO, POC: 2.3

## 2019-09-03 PROCEDURE — 99211 OFF/OP EST MAY X REQ PHY/QHP: CPT

## 2019-09-03 PROCEDURE — 85610 PROTHROMBIN TIME: CPT

## 2019-09-24 ENCOUNTER — ANTI-COAG VISIT (OUTPATIENT)
Dept: PHARMACY | Age: 39
End: 2019-09-24
Payer: MEDICAID

## 2019-09-24 ENCOUNTER — TELEPHONE (OUTPATIENT)
Dept: PHARMACY | Age: 39
End: 2019-09-24

## 2019-09-24 DIAGNOSIS — I26.99 PULMONARY EMBOLISM WITHOUT ACUTE COR PULMONALE, UNSPECIFIED CHRONICITY, UNSPECIFIED PULMONARY EMBOLISM TYPE (HCC): ICD-10-CM

## 2019-09-24 LAB — INTERNATIONAL NORMALIZATION RATIO, POC: 2.8

## 2019-09-24 PROCEDURE — 85610 PROTHROMBIN TIME: CPT

## 2019-09-24 PROCEDURE — 99211 OFF/OP EST MAY X REQ PHY/QHP: CPT

## 2019-09-24 NOTE — TELEPHONE ENCOUNTER
----- Message from Rakan Vega sent at 9/24/2019  7:39 AM EDT -----  Contact: Patient  Please call patient back regarding leg pain. [phone].

## 2019-09-24 NOTE — PROGRESS NOTES
Ms. Jovanna Curry is a 41 y.o. y/o female with history of DVT, PE in about 2010. She presents today for anticoagulation monitoring and adjustment. Pertinent PMH: DVT assoc with PICC line  Hx of multiple sclerosis,   Patient Reported Findings:  Yes     No  [x]   []       Patient verifies current dosing regimen as listed   [x]   []       S/S bleeding/bruising/swelling/SOB states that she was worried last night that she had a clot because she had pain in leg. States now that she thinks it is a muscle cramp now   []   [x]       Blood in urine or stool   []   [x]       Procedures scheduled in the future at this time   [x]   []       Missed Dose states that she has missed doses but she cant remember when, denies any in the past week   []   [x]       Extra Dose  [x]   []       Change in medications   States that she took pepto on accident, was worried because she knew it had asa   []   [x]       Change in health/diet/appetite Normal vegetable is about twice a week and generally a low vitamin K. When she has broccoli it would be in a stir truong. May have a salad only once a month.   []   [x]       Change in alcohol use   []   [x]       Change in activity  []   [x]       Hospital admission   []   [x]       Emergency department visit   []   [x]       Other complaints       Clinical Outcomes:  Yes     No  []   [x]       Major bleeding event  []   [x]       Thromboembolic event    Duration of warfarin Therapy: Indefinitely  INR Range:  2.0-3.0     INR is 2.8 today   Continue weekly dose of 7.5 mg on Mon and Fri and 6.25 mg all other days of the week. Encouraged to maintain a consistency of vegetables/salads.   Recheck INR in 3 weeks, 10/15    Referring PCP is Dr. Lucrecia John results  INR (no units)   Date Value   09/24/2019 2.8   09/03/2019 2.3   07/31/2019 2.4   07/11/2019 1.7   09/21/2018 2.2   09/07/2018 2.7   08/29/2018 1.7   08/24/2018 6.9

## 2019-09-24 NOTE — TELEPHONE ENCOUNTER
Called patient back. Said that her leg has been hurting. Asked for the symptoms and explained signs/symptoms of a clot. She states that it is likely a cramp. She is on the way in right now to get her INR checked.     Ervin Del Rosario, PharmD, [facility]

## 2019-10-17 ENCOUNTER — TELEPHONE (OUTPATIENT)
Dept: PHARMACY | Age: 39
End: 2019-10-17

## 2019-10-22 ENCOUNTER — ANTI-COAG VISIT (OUTPATIENT)
Dept: PHARMACY | Age: 39
End: 2019-10-22
Payer: MEDICAID

## 2019-10-22 DIAGNOSIS — I26.99 PULMONARY EMBOLISM WITHOUT ACUTE COR PULMONALE, UNSPECIFIED CHRONICITY, UNSPECIFIED PULMONARY EMBOLISM TYPE (HCC): ICD-10-CM

## 2019-10-22 DIAGNOSIS — Z29.9 DVT PROPHYLAXIS: ICD-10-CM

## 2019-10-22 LAB — INTERNATIONAL NORMALIZATION RATIO, POC: 2.9

## 2019-10-22 PROCEDURE — 99211 OFF/OP EST MAY X REQ PHY/QHP: CPT

## 2019-10-22 PROCEDURE — 85610 PROTHROMBIN TIME: CPT

## 2019-11-20 ENCOUNTER — TELEPHONE (OUTPATIENT)
Dept: PHARMACY | Age: 39
End: 2019-11-20

## 2019-11-26 ENCOUNTER — TELEPHONE (OUTPATIENT)
Dept: PHARMACY | Age: 39
End: 2019-11-26

## 2019-12-02 ENCOUNTER — APPOINTMENT (OUTPATIENT)
Dept: PHARMACY | Age: 39
End: 2019-12-02
Payer: MEDICAID

## 2019-12-03 ENCOUNTER — ANTI-COAG VISIT (OUTPATIENT)
Dept: PHARMACY | Age: 39
End: 2019-12-03
Payer: MEDICAID

## 2019-12-03 DIAGNOSIS — Z29.9 DVT PROPHYLAXIS: ICD-10-CM

## 2019-12-03 DIAGNOSIS — Z79.01 LONG TERM (CURRENT) USE OF ANTICOAGULANTS: ICD-10-CM

## 2019-12-03 DIAGNOSIS — I26.09 PULMONARY EMBOLISM WITH ACUTE COR PULMONALE, UNSPECIFIED CHRONICITY, UNSPECIFIED PULMONARY EMBOLISM TYPE (HCC): ICD-10-CM

## 2019-12-03 LAB — INTERNATIONAL NORMALIZATION RATIO, POC: 1.9

## 2019-12-03 PROCEDURE — 85610 PROTHROMBIN TIME: CPT

## 2019-12-03 PROCEDURE — 99211 OFF/OP EST MAY X REQ PHY/QHP: CPT

## 2019-12-17 PROBLEM — Z79.01 LONG TERM (CURRENT) USE OF ANTICOAGULANTS: Status: ACTIVE | Noted: 2019-12-17

## 2019-12-30 ENCOUNTER — TELEPHONE (OUTPATIENT)
Dept: PHARMACY | Age: 39
End: 2019-12-30

## 2020-01-06 ENCOUNTER — TELEPHONE (OUTPATIENT)
Dept: PHARMACY | Age: 40
End: 2020-01-06

## 2020-01-06 NOTE — TELEPHONE ENCOUNTER
Returned call to patient. Unable to verify what INR was in hospital. Explained that Keflex will not interact with warfarin. Continue to take 7.5 mg on Mon and Fri and 6.25 mg all other days of the week. R/s pt to RTC 1/10. Pt might have r/s based on aid transportation.

## 2020-01-16 ENCOUNTER — ANTI-COAG VISIT (OUTPATIENT)
Dept: PHARMACY | Age: 40
End: 2020-01-16
Payer: MEDICAID

## 2020-01-16 LAB — INTERNATIONAL NORMALIZATION RATIO, POC: 3.4

## 2020-01-16 PROCEDURE — 99211 OFF/OP EST MAY X REQ PHY/QHP: CPT

## 2020-01-16 PROCEDURE — 85610 PROTHROMBIN TIME: CPT

## 2020-01-16 NOTE — PROGRESS NOTES
Ms. Cristian Segura is a 42 y.o. y/o female with history of DVT, PE in about 2010. She presents today for anticoagulation monitoring and adjustment. Pertinent PMH: DVT assoc with PICC line  Hx of multiple sclerosis,   Patient Reported Findings:  Yes     No  [x]   []       Patient verifies current dosing regimen as listed   [x]   []       S/S bleeding/bruising/swelling/SOB   []   [x]       Blood in urine or stool   []   [x]       Procedures scheduled in the future at this time   []   [x]       Missed Dose-   [x]   []       Extra Dose states she might have taking 8.75 mg on Mon and Fri rather than 7.5 mg   []   [x]       Change in medications-   [x]   []       Change in health/diet/appetite Normal vegetable is about twice a week and generally a low vitamin K. When she has broccoli it would be in a stir truong. May have a salad only once a month.---> states she has not had vit k in a while     []   [x]       Change in alcohol use   []   [x]       Change in activity  []   [x]       Hospital admission   [x]   []       Emergency department visit -went to ER 1/2 for UTI, was given keflex. []   [x]       Other complaints       Clinical Outcomes:  Yes     No  []   [x]       Major bleeding event  []   [x]       Thromboembolic event    Duration of warfarin Therapy: Indefinitely  INR Range:  2.0-3.0     INR is 3.4 today likely d/t taking incorrect dose on mon and fri   Continue weekly dose of 7.5 mg on Mon and Fri and 6.25 mg all other days of the week    Encouraged to maintain a consistency of vegetables/salads.   Recheck INR in 3 weeks, 2/5    Referring PCP is Dr. Vanessa Morales results  INR (no units)   Date Value   01/16/2020 3.4   12/03/2019 1.9   10/22/2019 2.9   09/24/2019 2.8   09/21/2018 2.2   09/07/2018 2.7   08/29/2018 1.7   08/24/2018 6.9

## 2020-02-06 ENCOUNTER — ANTI-COAG VISIT (OUTPATIENT)
Dept: PHARMACY | Age: 40
End: 2020-02-06
Payer: MEDICAID

## 2020-02-06 LAB — INTERNATIONAL NORMALIZATION RATIO, POC: 3.8

## 2020-02-06 PROCEDURE — 85610 PROTHROMBIN TIME: CPT

## 2020-02-06 PROCEDURE — 99211 OFF/OP EST MAY X REQ PHY/QHP: CPT

## 2020-02-18 ENCOUNTER — APPOINTMENT (OUTPATIENT)
Dept: PHARMACY | Age: 40
End: 2020-02-18
Payer: MEDICAID

## 2020-02-18 ENCOUNTER — TELEPHONE (OUTPATIENT)
Dept: PHARMACY | Age: 40
End: 2020-02-18

## 2020-02-20 ENCOUNTER — ANTI-COAG VISIT (OUTPATIENT)
Dept: PHARMACY | Age: 40
End: 2020-02-20
Payer: MEDICAID

## 2020-02-20 LAB — INTERNATIONAL NORMALIZATION RATIO, POC: 3.8

## 2020-02-20 PROCEDURE — 99211 OFF/OP EST MAY X REQ PHY/QHP: CPT

## 2020-02-20 PROCEDURE — 85610 PROTHROMBIN TIME: CPT

## 2020-02-20 NOTE — PROGRESS NOTES
Ms. Kamar Alejandro is a 42 y.o. y/o female with history of DVT, PE in about 2010. She presents today for anticoagulation monitoring and adjustment. Pertinent PMH: DVT assoc with PICC line  Hx of multiple sclerosis,   Patient Reported Findings:  Yes     No  [x]   []       Patient verifies current dosing regimen as listed   [x]   []       S/S bleeding/bruising/swelling/SOB - denies   []   [x]       Blood in urine or stool -denies   []   [x]       Procedures scheduled in the future at this time - denies   []   [x]       Missed Dose-   [x]   []       Extra Dose -denies    []   [x]       Change in medications-using perc and tylenol prn for pain with new braces- states this will continue    [x]   []       Change in health/diet/appetite Normal vegetable is about twice a week and generally a low vitamin K. When she has broccoli it would be in a stir truong. May have a salad only once a month.---> states she has not had vit k in a while---> has not had her greens recently, waiting on them to be made---> has not had greens, wants to add them back in       []   [x]       Change in alcohol use - denies   []   [x]       Change in activity  []   [x]       Hospital admission   []   [x]       Emergency department visit   []   [x]       Other complaints       Clinical Outcomes:  Yes     No  []   [x]       Major bleeding event  []   [x]       Thromboembolic event    Duration of warfarin Therapy: Indefinitely  INR Range:  2.0-3.0     INR is 3.8 again today despite dose decrease. Increased her pain meds and no greens. Pt will add greens back in. Hold tonight then decrease dose to 6.25 mg all other days of the week. Encouraged to maintain a consistency of vegetables/salads.    Recheck INR in 2 weeks, 3/5    Referring PCP is Dr. Toan Urbina results  INR (no units)   Date Value   02/20/2020 3.8   02/06/2020 3.8   01/16/2020 3.4   12/03/2019 1.9   09/21/2018 2.2   09/07/2018 2.7   08/29/2018 1.7   08/24/2018 6.9

## 2020-03-05 ENCOUNTER — TELEPHONE (OUTPATIENT)
Dept: PHARMACY | Age: 40
End: 2020-03-05

## 2020-03-20 ENCOUNTER — TELEPHONE (OUTPATIENT)
Dept: PHARMACY | Age: 40
End: 2020-03-20

## 2020-04-09 ENCOUNTER — TELEPHONE (OUTPATIENT)
Dept: PHARMACY | Age: 40
End: 2020-04-09

## 2020-04-09 ENCOUNTER — ANTI-COAG VISIT (OUTPATIENT)
Dept: PHARMACY | Age: 40
End: 2020-04-09
Payer: MEDICAID

## 2020-04-09 DIAGNOSIS — Z79.01 LONG TERM (CURRENT) USE OF ANTICOAGULANTS: ICD-10-CM

## 2020-04-09 DIAGNOSIS — Z79.899 DVT PROPHYLAXIS: ICD-10-CM

## 2020-04-09 LAB
INR BLD: 2.85 (ref 0.86–1.14)
PROTHROMBIN TIME: 33.4 SEC (ref 10–13.2)

## 2020-04-09 PROCEDURE — 99211 OFF/OP EST MAY X REQ PHY/QHP: CPT

## 2020-04-09 NOTE — TELEPHONE ENCOUNTER
Patient called and said she was leaving the house now and will be at [facility] for her INR draw around 3pm.  [facility] has already sent over the order.

## 2020-04-24 ENCOUNTER — TELEPHONE (OUTPATIENT)
Dept: PHARMACY | Age: 40
End: 2020-04-24

## 2020-04-24 NOTE — TELEPHONE ENCOUNTER
----- Message from Tremaine Hilario sent at 4/24/2020  8:55 AM EDT -----  Pt left v/m stating she forgot to tell pharmacist she is on Ciprofloxacin ( started on Monday or Tuesday shes not sure which day.  Please call pt at [phone]

## 2020-04-24 NOTE — TELEPHONE ENCOUNTER
Pt started Cipro earlier this week for 7 days- will have her take 5mg tonight instead of 6.25mg then resume normal dose.      Gerber Soliz, PharmD, RPh

## 2020-05-07 DIAGNOSIS — Z79.01 LONG TERM (CURRENT) USE OF ANTICOAGULANTS: ICD-10-CM

## 2020-05-07 DIAGNOSIS — Z79.899 DVT PROPHYLAXIS: ICD-10-CM

## 2020-05-07 LAB
INR BLD: 4.44 (ref 0.86–1.14)
PROTHROMBIN TIME: 52.3 SEC (ref 10–13.2)

## 2020-05-08 ENCOUNTER — ANTI-COAG VISIT (OUTPATIENT)
Dept: PHARMACY | Age: 40
End: 2020-05-08
Payer: MEDICAID

## 2020-05-08 PROCEDURE — 99211 OFF/OP EST MAY X REQ PHY/QHP: CPT

## 2020-05-08 NOTE — PROGRESS NOTES
Ms. Courtney Boyd is a 42 y.o. y/o female with history of DVT, PE in about 2010. She presents today for anticoagulation monitoring and adjustment. Pertinent PMH: DVT assoc with PICC line  Hx of multiple sclerosis,   Patient Reported Findings:  Yes     No  [x]   []       Patient verifies current dosing regimen as listed---> confirms dose    []   [x]       S/S bleeding/bruising/swelling/SOB - denies   []   [x]       Blood in urine or stool -denies   []   [x]       Procedures scheduled in the future at this time - denies   [x]   []       Missed Dose  []   [x]       Extra Dose -denies    []   [x]       Change in medications-using perc and tylenol prn for pain with new braces- states this will continue---> was on cipro last week, has been done for >1 week now, some tylenol more than normal      []   [x]       Change in health/diet/appetite Normal vegetable is about twice a week and generally a low vitamin K. When she has broccoli it would be in a stir truong. May have a salad only once a month.---> states she has not had vit k in a while---> has not had her greens recently, waiting on them to be made---> has not had greens, wants to add them back in---> had vegetables, maybe less greens        []   [x]       Change in alcohol use - denies ---> denies   []   [x]       Change in activity  []   [x]       Hospital admission   []   [x]       Emergency department visit   []   [x]       Other complaints       Clinical Outcomes:  Yes     No  []   [x]       Major bleeding event  []   [x]       Thromboembolic event    Duration of warfarin Therapy: Indefinitely  INR Range:  2.0-3.0     INR was 4.44 yesterday via venipuncture at [facility]. Pt states she is done with abx, has had more tylenol recently dt sinus infections, is using a herbal called sea rivero, and maybe has less greens. Since therapeutic on this dose at last visit, will hold and continue but if high in 10 days then decrease dose again.    Hold tonight, take 2.5mg tomorrow

## 2020-05-20 DIAGNOSIS — Z79.899 DVT PROPHYLAXIS: ICD-10-CM

## 2020-05-20 DIAGNOSIS — Z79.01 LONG TERM (CURRENT) USE OF ANTICOAGULANTS: ICD-10-CM

## 2020-05-20 LAB
INR BLD: 2.43 (ref 0.86–1.14)
PROTHROMBIN TIME: 28.4 SEC (ref 10–13.2)

## 2020-05-21 ENCOUNTER — ANTI-COAG VISIT (OUTPATIENT)
Dept: PHARMACY | Age: 40
End: 2020-05-21

## 2020-05-21 PROCEDURE — 99211 OFF/OP EST MAY X REQ PHY/QHP: CPT

## 2020-05-21 NOTE — PROGRESS NOTES
02/20/2020 3.8   02/06/2020 3.8   01/16/2020 3.4   12/03/2019 1.9   09/21/2018 2.2     CLINICAL PHARMACY CONSULT: MED RECONCILIATION/REVIEW ADDENDUM    For Pharmacy Admin Tracking Only    PHSO: No  Total # of Interventions Recommended: 0  - Maintenance Safety Lab Monitoring #: 1  Total Interventions Accepted: 0  Time Spent (min): 15    Liz Grimm PharmD

## 2020-05-26 ENCOUNTER — TELEPHONE (OUTPATIENT)
Dept: PHARMACY | Age: 40
End: 2020-05-26

## 2020-06-11 ENCOUNTER — ANTI-COAG VISIT (OUTPATIENT)
Dept: PHARMACY | Age: 40
End: 2020-06-11
Payer: MEDICAID

## 2020-06-11 DIAGNOSIS — Z79.899 DVT PROPHYLAXIS: ICD-10-CM

## 2020-06-11 DIAGNOSIS — Z79.01 LONG TERM (CURRENT) USE OF ANTICOAGULANTS: ICD-10-CM

## 2020-06-11 LAB
INR BLD: 3.55 (ref 0.86–1.14)
PROTHROMBIN TIME: 41.7 SEC (ref 10–13.2)

## 2020-06-11 PROCEDURE — 99212 OFFICE O/P EST SF 10 MIN: CPT

## 2020-06-11 NOTE — PROGRESS NOTES
Ms. Ema Busby is a 42 y.o. y/o female with history of DVT, PE in about 2010. She presents today for anticoagulation monitoring and adjustment. Pertinent PMH: DVT assoc with PICC line  Hx of multiple sclerosis,   Patient Reported Findings:  Yes     No  [x]   []       Patient verifies current dosing regimen as listed---> confirms dose    []   [x]       S/S bleeding/bruising/swelling/SOB - denies   []   [x]       Blood in urine or stool -denies   []   [x]       Procedures scheduled in the future at this time - denies   [x]   []       Missed Dose states that she missed one dose a few days ago but does not remember when   []   [x]       Extra Dose -denies    [x]   []       Change in medications-using perc and tylenol prn for pain with new braces- states this will continue---> is currently taking ceftin    []   [x]       Change in health/diet/appetite Normal vegetable is about twice a week and generally a low vitamin K. When she has broccoli it would be in a stir truong. May have a salad only once a month.---> states she has not had vit k in a while---> has not had her greens recently, waiting on them to be made---> has not had greens, wants to add them back in---> had vegetables, maybe less greens --> no changes        []   [x]       Change in alcohol use - denies ---> denies   []   [x]       Change in activity  []   [x]       Hospital admission   []   [x]       Emergency department visit   []   [x]       Other complaints       Clinical Outcomes:  Yes     No  []   [x]       Major bleeding event  []   [x]       Thromboembolic event    Duration of warfarin Therapy: Indefinitely  INR Range:  2.0-3.0     INR was 3.55 today via venipuncture at [facility].   Hold dose today then continue dose of 6.25 mg daily. Encouraged to maintain a consistency of vegetables/salads.    Recheck INR in 2 weeks, 6/25    Referring PCP is Dr. Dionna Deras results  INR (no units)   Date Value   06/11/2020 3.55 (H)   05/20/2020 2.43 (H) 05/07/2020 4.44 (H)   04/09/2020 2.85 (H)     CLINICAL PHARMACY CONSULT: MED RECONCILIATION/REVIEW ADDENDUM    For Pharmacy Admin Tracking Only    PHSO: No  Total # of Interventions Recommended: 1  - Decreased Dose #: 1  - Maintenance Safety Lab Monitoring #: 1  Total Interventions Accepted: 1  Time Spent (min): 15    Lukasz GutierrezD

## 2020-06-16 ENCOUNTER — TELEPHONE (OUTPATIENT)
Dept: PHARMACY | Age: 40
End: 2020-06-16

## 2020-06-16 NOTE — TELEPHONE ENCOUNTER
----- Message from Rogene Denver sent at 6/16/2020  5:00 PM EDT -----  Pt is having surgery on 6/26 , they wanted her to call [facility] to see if she needed to hold warfarin. Advised pt to call Dr Orlin Plata  ( referring MD) for any dosing instructions needed for procedure and to call back and give pharmacist this information so next [facility] appt can be scheduled.

## 2020-06-16 NOTE — TELEPHONE ENCOUNTER
Pt is having surgery on 6/26 she called wanting to know if she needed to hold warfarin. Advised pt to contact Dr Sweta Trejo (Ref MD) for any dosing instructions needed for surgery . Pt will call back to let pharmacist know what MD has instructed her to do.

## 2020-06-18 ENCOUNTER — TELEPHONE (OUTPATIENT)
Dept: PHARMACY | Age: 40
End: 2020-06-18

## 2020-06-18 NOTE — TELEPHONE ENCOUNTER
Returned call to pt. Explained that will interact with warfarin but since holding for procedure, will not adjust.     Pt verified she will be to clinic tomorrow morning to p/u instructions for bridging. Explained that clinic is closed on weekend so if does not p/u tomorrow she will not have instructions for bridging.

## 2020-06-19 NOTE — TELEPHONE ENCOUNTER
Pt was unable to get someone to p/u lovenox instructions today, she s/w pharmacist and will pick them up on Monday.

## 2020-06-23 ENCOUNTER — TELEPHONE (OUTPATIENT)
Dept: PHARMACY | Age: 40
End: 2020-06-23

## 2020-06-23 NOTE — TELEPHONE ENCOUNTER
----- Message from Nica Flores sent at 6/23/2020 12:19 PM EDT -----  Regarding: Surgery postponed  Contact: Patient  Patient's surgery has been postponed to 7/9. Please call regarding her bridging schedule.   [phone]

## 2020-07-01 ENCOUNTER — ANTI-COAG VISIT (OUTPATIENT)
Dept: PHARMACY | Age: 40
End: 2020-07-01
Payer: MEDICAID

## 2020-07-01 ENCOUNTER — TELEPHONE (OUTPATIENT)
Dept: PHARMACY | Age: 40
End: 2020-07-01

## 2020-07-01 VITALS — TEMPERATURE: 97.5 F

## 2020-07-01 LAB — INTERNATIONAL NORMALIZATION RATIO, POC: 3.8

## 2020-07-01 PROCEDURE — 85610 PROTHROMBIN TIME: CPT

## 2020-07-01 PROCEDURE — 99212 OFFICE O/P EST SF 10 MIN: CPT

## 2020-07-01 NOTE — TELEPHONE ENCOUNTER
Pt did not go to lab yesterday, 6/30. Need to have INR checked prior to holding again for procedure. Need to verify is procedure still scheduled for 7/9.      Called pt LVM asking for return call to discuss INR check and dosing for procedure

## 2020-07-01 NOTE — PROGRESS NOTES
Ms. Alexi Galloway is a 42 y.o. y/o female with history of DVT, PE in about 2010. She presents today for anticoagulation monitoring and adjustment. Pertinent PMH: DVT assoc with PICC line  Hx of multiple sclerosis,   Patient Reported Findings:  Yes     No  [x]   []       Patient verifies current dosing regimen as listed---> confirms dose    []   [x]       S/S bleeding/bruising/swelling/SOB - denies   []   [x]       Blood in urine or stool -denies   [x]   []       Procedures scheduled in the future at this time - had procedure schedule 6/26. Was being bridged but procedure was cancelled. Was rescheduled for 7/9. Told to hold 5 days prior and bridge with lovenox. [x]   []       Missed Dose    []   [x]       Extra Dose -denies    [x]   []       Change in medications-using perc and tylenol prn for pain with new braces- states this will continue---> was given one dose of fluconazole, but was holding warfarin for procedure. Starting cefdinir on Sunday    []   [x]       Change in health/diet/appetite Normal vegetable is about twice a week and generally a low vitamin K. When she has broccoli it would be in a stir truong. May have a salad only once a month.---> states she has not had vit k in a while---> has not had her greens recently, waiting on them to be made---> has not had greens, wants to add them back in---> had vegetables, maybe less greens --> no changes        []   [x]       Change in alcohol use - denies ---> denies   []   [x]       Change in activity  []   [x]       Hospital admission   []   [x]       Emergency department visit   []   [x]       Other complaints       Clinical Outcomes:  Yes     No  []   [x]       Major bleeding event  []   [x]       Thromboembolic event    Duration of warfarin Therapy: Indefinitely  INR Range:  2.0-3.0     INR is 3.8 today    Continue dose of 6.25 mg daily. Begin holding warfarin on 7/4 until procedure on 7/9.  Resume taking 7.5 mg for 2 days then return to 6.25 mg daily See bridging below  Encouraged to maintain a consistency of vegetables/salads.    Recheck INR in 10 days, 7/13    Referring PCP is Dr. Amy Odonnell results  INR (no units)   Date Value   07/01/2020 3.8   06/11/2020 3.55 (H)   05/20/2020 2.43 (H)   05/07/2020 4.44 (H)   04/09/2020 2.85 (H)     CLINICAL PHARMACY CONSULT: MED RECONCILIATION/REVIEW ADDENDUM    For Pharmacy Admin Tracking Only    PHSO: No  Total # of Interventions Recommended: 1  - Decreased Dose #: 1  - Maintenance Safety Lab Monitoring #: 1  Total Interventions Accepted: 1  Time Spent (min): 15    Marylene Self, PharmD      Day  Date Warfarin Dose  Lovenox Dose    5 days before 7/4/2020 Stop warfarin  8 AM: No Lovenox  8 PM: No Lovenox    4 days before  7/5/2020 No warfarin   8 AM: No Lovenox   8 PM: 60 mg injection   3 days before  7/6/2020 No warfarin   8 AM: 60 mg injection  8 PM: 60 mg injection   2 days before  7/7/2020 No warfarin   8 AM: 60 mg injection  8 PM: 60 mg injection   1 day before  7/8/2020 No Warfarin    8 AM: 60 mg injection  8 PM: NO LOVENOX   Day of procedure 7/9/2020 Take  7.5 mg of warfarin in the evening 8 AM: NO LOVENOX  8 PM: 60 mg injection   1 day after  7/10/2020 Take  7.5 mg of warfarin in the evening 8 AM: 60 mg injection  8 PM: 60 mg injection   2 days after  7/11/2020 Take 6.25 mg of warfarin in the evening 8 AM: 60 mg injection  8 PM: 60 mg injection   3 days after  7/12/2020 Take 6.25 mg of warfarin in the evening 8 AM: 60 mg injection  8 PM: 60 mg injection

## 2020-07-10 ENCOUNTER — TELEPHONE (OUTPATIENT)
Dept: PHARMACY | Age: 40
End: 2020-07-10

## 2020-07-10 NOTE — TELEPHONE ENCOUNTER
Pt called, was placed on cipro at d/c after procedure on 7/9. Was told to not resume warfarin until this evening d/t bleeding post op. Advised for pt to take warfarin 7.5 mg tonight and tomorrow then return to 6.25 mg daily. Resume lovenox injections this am and continue until RTC.  R/s appt to Tuesday 4 pm

## 2020-07-14 ENCOUNTER — ANTI-COAG VISIT (OUTPATIENT)
Dept: PHARMACY | Age: 40
End: 2020-07-14
Payer: MEDICAID

## 2020-07-14 LAB — INTERNATIONAL NORMALIZATION RATIO, POC: 1.2

## 2020-07-14 PROCEDURE — 99212 OFFICE O/P EST SF 10 MIN: CPT

## 2020-07-14 PROCEDURE — 85610 PROTHROMBIN TIME: CPT

## 2020-07-14 NOTE — TELEPHONE ENCOUNTER
Lovenox prescription phoned into [address] [place] [address] [phone] under Dr. Itzel Fitzgerald ([phone])  Lovenox 60 mg syringes  Inject 60 mg SQ BID AD  20 syringes  1 refill

## 2020-07-14 NOTE — PROGRESS NOTES
Ms. Ollis Boast is a 42 y.o. y/o female with history of DVT, PE in about 2010. She presents today for anticoagulation monitoring and adjustment. Pertinent PMH: DVT assoc with PICC line  Hx of multiple sclerosis,   Patient Reported Findings:  Yes     No  [x]   []       Patient verifies current dosing regimen as listed---> confirms dose    []   [x]       S/S bleeding/bruising/swelling/SOB - denies   []   [x]       Blood in urine or stool -denies   [x]   []       Procedures scheduled in the future at this time - had procedure schedule 6/26. Was being bridged but procedure was cancelled. Was rescheduled for 7/9. Told to hold 5 days prior and bridge with lovenox. --> resumed warfarin 7/10 d/t post op bleeding. And lovenox bridge. Pt has botox procedure on 7/22, MD wants pt to hold 3-5 days prior. Will need to continue lovenox bridge    []   [x]       Missed Dose    []   [x]       Extra Dose -denies    [x]   []       Change in medications-using perc and tylenol prn for pain with new braces- states this will continue---> was given one dose of fluconazole, but was holding warfarin for procedure. Starting cefdinir on Sunday  --> started cipro 7/9, will finish this coming weekend   [x]   []       Change in health/diet/appetite Normal vegetable is about twice a week and generally a low vitamin K. When she has broccoli it would be in a stir truong.  May have a salad only once a month.---> states she has not had vit k in a while---> has not had her greens recently, waiting on them to be made---> has not had greens, wants to add them back in---> had vegetables, no significant vit k         []   [x]       Change in alcohol use - denies ---> denies   []   [x]       Change in activity  []   [x]       Hospital admission   []   [x]       Emergency department visit   []   [x]       Other complaints       Clinical Outcomes:  Yes     No  []   [x]       Major bleeding event  []   [x]       Thromboembolic event    Duration of warfarin Therapy: Indefinitely  INR Range:  2.0-3.0     INR is 1.2 today    Take 7.5 mg for next 3 days then take 6.25 mg on Fri. Begin holding warfarin again on 7/18 until 7/22. Restart taking 7.5 mg daily until return to clinic. Continue to use lovenox twice daily until return to clinic. Encouraged to maintain a consistency of vegetables/salads.    Called in more refills for lovenox to pharmacy   Recheck INR in 10 days, 7/27    Referring PCP is Dr. Antoine Encinas results  INR (no units)   Date Value   07/14/2020 1.2   07/01/2020 3.8   06/11/2020 3.55 (H)   05/20/2020 2.43 (H)   05/07/2020 4.44 (H)   04/09/2020 2.85 (H)     CLINICAL PHARMACY CONSULT: MED RECONCILIATION/REVIEW ADDENDUM    For Pharmacy Admin Tracking Only    PHSO: No  Total # of Interventions Recommended: 2  - Increased Dose #: 1  - Refills Provided #: 1  - Maintenance Safety Lab Monitoring #: 1  Total Interventions Accepted: 2  Time Spent (min): 15    Billy Bloch, LukaszD

## 2020-07-16 ENCOUNTER — HOSPITAL ENCOUNTER (EMERGENCY)
Age: 40
Discharge: HOME | End: 2020-07-17
Attending: EMERGENCY MEDICINE
Payer: MEDICAID

## 2020-07-16 PROCEDURE — 99284 EMERGENCY DEPT VISIT MOD MDM: CPT

## 2020-07-16 PROCEDURE — 93005 ELECTROCARDIOGRAM TRACING: CPT | Performed by: EMERGENCY MEDICINE

## 2020-07-16 ASSESSMENT — PAIN SCALES - GENERAL: PAINLEVEL_OUTOF10: 7

## 2020-07-17 VITALS
SYSTOLIC BLOOD PRESSURE: 108 MMHG | DIASTOLIC BLOOD PRESSURE: 86 MMHG | HEART RATE: 76 BPM | HEIGHT: 62 IN | BODY MASS INDEX: 21.16 KG/M2 | WEIGHT: 115 LBS | TEMPERATURE: 97.6 F | OXYGEN SATURATION: 97 % | RESPIRATION RATE: 16 BRPM

## 2020-07-17 LAB
A/G RATIO: 1.1 (ref 1.1–2.2)
ALBUMIN SERPL-MCNC: 3.8 G/DL (ref 3.4–5)
ALP BLD-CCNC: 70 U/L (ref 40–129)
ALT SERPL-CCNC: 19 U/L (ref 10–40)
ANION GAP SERPL CALCULATED.3IONS-SCNC: 9 MMOL/L (ref 3–16)
AST SERPL-CCNC: 22 U/L (ref 15–37)
BACTERIA: ABNORMAL /HPF
BASOPHILS ABSOLUTE: 0.1 K/UL (ref 0–0.2)
BASOPHILS RELATIVE PERCENT: 1.9 %
BILIRUB SERPL-MCNC: <0.2 MG/DL (ref 0–1)
BILIRUBIN URINE: NEGATIVE
BLOOD, URINE: ABNORMAL
BUN BLDV-MCNC: 16 MG/DL (ref 7–20)
CALCIUM SERPL-MCNC: 9.1 MG/DL (ref 8.3–10.6)
CHLORIDE BLD-SCNC: 105 MMOL/L (ref 99–110)
CLARITY: ABNORMAL
CO2: 24 MMOL/L (ref 21–32)
COLOR: ABNORMAL
CREAT SERPL-MCNC: <0.5 MG/DL (ref 0.6–1.1)
EKG ATRIAL RATE: 87 BPM
EKG DIAGNOSIS: NORMAL
EKG P AXIS: 71 DEGREES
EKG P-R INTERVAL: 156 MS
EKG Q-T INTERVAL: 376 MS
EKG QRS DURATION: 84 MS
EKG QTC CALCULATION (BAZETT): 452 MS
EKG R AXIS: 12 DEGREES
EKG T AXIS: 53 DEGREES
EKG VENTRICULAR RATE: 87 BPM
EOSINOPHILS ABSOLUTE: 0.1 K/UL (ref 0–0.6)
EOSINOPHILS RELATIVE PERCENT: 1.9 %
EPITHELIAL CELLS, UA: ABNORMAL /HPF (ref 0–5)
GFR AFRICAN AMERICAN: >60
GFR NON-AFRICAN AMERICAN: >60
GLOBULIN: 3.5 G/DL
GLUCOSE BLD-MCNC: 114 MG/DL (ref 70–99)
GLUCOSE URINE: NEGATIVE MG/DL
HCG QUALITATIVE: NEGATIVE
HCT VFR BLD CALC: 35.1 % (ref 36–48)
HEMOGLOBIN: 11.7 G/DL (ref 12–16)
INR BLD: 2.16 (ref 0.86–1.14)
KETONES, URINE: NEGATIVE MG/DL
LEUKOCYTE ESTERASE, URINE: ABNORMAL
LIPASE: 35 U/L (ref 13–60)
LYMPHOCYTES ABSOLUTE: 1.1 K/UL (ref 1–5.1)
LYMPHOCYTES RELATIVE PERCENT: 25.4 %
MAGNESIUM: 2.1 MG/DL (ref 1.8–2.4)
MCH RBC QN AUTO: 28.2 PG (ref 26–34)
MCHC RBC AUTO-ENTMCNC: 33.4 G/DL (ref 31–36)
MCV RBC AUTO: 84.5 FL (ref 80–100)
MICROSCOPIC EXAMINATION: YES
MONOCYTES ABSOLUTE: 0.4 K/UL (ref 0–1.3)
MONOCYTES RELATIVE PERCENT: 9.9 %
NEUTROPHILS ABSOLUTE: 2.6 K/UL (ref 1.7–7.7)
NEUTROPHILS RELATIVE PERCENT: 60.9 %
NITRITE, URINE: NEGATIVE
PDW BLD-RTO: 14.2 % (ref 12.4–15.4)
PH UA: 7.5 (ref 5–8)
PLATELET # BLD: 278 K/UL (ref 135–450)
PMV BLD AUTO: 9 FL (ref 5–10.5)
POTASSIUM REFLEX MAGNESIUM: 3.4 MMOL/L (ref 3.5–5.1)
PROTEIN UA: 100 MG/DL
PROTHROMBIN TIME: 25.3 SEC (ref 10–13.2)
RBC # BLD: 4.15 M/UL (ref 4–5.2)
RBC UA: >100 /HPF (ref 0–4)
SODIUM BLD-SCNC: 138 MMOL/L (ref 136–145)
SPECIFIC GRAVITY UA: 1.02 (ref 1–1.03)
TOTAL PROTEIN: 7.3 G/DL (ref 6.4–8.2)
URINE REFLEX TO CULTURE: YES
URINE TYPE: ABNORMAL
UROBILINOGEN, URINE: 0.2 E.U./DL
WBC # BLD: 4.2 K/UL (ref 4–11)
WBC UA: ABNORMAL /HPF (ref 0–5)

## 2020-07-17 PROCEDURE — 84703 CHORIONIC GONADOTROPIN ASSAY: CPT

## 2020-07-17 PROCEDURE — 96374 THER/PROPH/DIAG INJ IV PUSH: CPT

## 2020-07-17 PROCEDURE — 83735 ASSAY OF MAGNESIUM: CPT

## 2020-07-17 PROCEDURE — 6370000000 HC RX 637 (ALT 250 FOR IP): Performed by: EMERGENCY MEDICINE

## 2020-07-17 PROCEDURE — 85610 PROTHROMBIN TIME: CPT

## 2020-07-17 PROCEDURE — 87077 CULTURE AEROBIC IDENTIFY: CPT

## 2020-07-17 PROCEDURE — 87086 URINE CULTURE/COLONY COUNT: CPT

## 2020-07-17 PROCEDURE — 6360000002 HC RX W HCPCS: Performed by: EMERGENCY MEDICINE

## 2020-07-17 PROCEDURE — 81001 URINALYSIS AUTO W/SCOPE: CPT

## 2020-07-17 PROCEDURE — 85025 COMPLETE CBC W/AUTO DIFF WBC: CPT

## 2020-07-17 PROCEDURE — 80053 COMPREHEN METABOLIC PANEL: CPT

## 2020-07-17 PROCEDURE — 36415 COLL VENOUS BLD VENIPUNCTURE: CPT

## 2020-07-17 PROCEDURE — 83690 ASSAY OF LIPASE: CPT

## 2020-07-17 PROCEDURE — 93010 ELECTROCARDIOGRAM REPORT: CPT | Performed by: INTERNAL MEDICINE

## 2020-07-17 PROCEDURE — 87186 SC STD MICRODIL/AGAR DIL: CPT

## 2020-07-17 RX ORDER — PHENAZOPYRIDINE HYDROCHLORIDE 200 MG/1
200 TABLET, FILM COATED ORAL 3 TIMES DAILY PRN
Qty: 9 TABLET | Refills: 0 | Status: SHIPPED | OUTPATIENT
Start: 2020-07-17 | End: 2020-07-20

## 2020-07-17 RX ORDER — PHENAZOPYRIDINE HYDROCHLORIDE 200 MG/1
200 TABLET, FILM COATED ORAL 3 TIMES DAILY PRN
Qty: 9 TABLET | Refills: 0 | Status: SHIPPED | OUTPATIENT
Start: 2020-07-17 | End: 2020-07-17 | Stop reason: SDUPTHER

## 2020-07-17 RX ORDER — CEFDINIR 300 MG/1
300 CAPSULE ORAL 2 TIMES DAILY
Qty: 20 CAPSULE | Refills: 0 | Status: SHIPPED | OUTPATIENT
Start: 2020-07-17 | End: 2020-07-27

## 2020-07-17 RX ORDER — PHENAZOPYRIDINE HYDROCHLORIDE 100 MG/1
200 TABLET, FILM COATED ORAL ONCE
Status: COMPLETED | OUTPATIENT
Start: 2020-07-17 | End: 2020-07-17

## 2020-07-17 RX ORDER — CEFDINIR 300 MG/1
600 CAPSULE ORAL ONCE
Status: COMPLETED | OUTPATIENT
Start: 2020-07-17 | End: 2020-07-17

## 2020-07-17 RX ORDER — KETOROLAC TROMETHAMINE 30 MG/ML
15 INJECTION, SOLUTION INTRAMUSCULAR; INTRAVENOUS ONCE
Status: COMPLETED | OUTPATIENT
Start: 2020-07-17 | End: 2020-07-17

## 2020-07-17 RX ADMIN — KETOROLAC TROMETHAMINE 15 MG: 30 INJECTION, SOLUTION INTRAMUSCULAR at 01:14

## 2020-07-17 RX ADMIN — PHENAZOPYRIDINE HYDROCHLORIDE 200 MG: 100 TABLET ORAL at 02:05

## 2020-07-17 RX ADMIN — CEFDINIR 600 MG: 300 CAPSULE ORAL at 02:05

## 2020-07-17 ASSESSMENT — PAIN SCALES - GENERAL: PAINLEVEL_OUTOF10: 7

## 2020-07-17 NOTE — ED PROVIDER NOTES
CHIEF COMPLAINT  Abdominal Pain (pt brought in by squad from home. pt c/o abdominal pain that she describes as a \"burning sensation\". pt with recent kidney stone removal. )      HISTORY OF PRESENT ILLNESS  Cade Collazo is a 42 y.o. female who presents to the ED with complaint of lower abdominal pain, she recently had lithotripsy and ureter stent done that was recently removed. Also what is home with a Nye catheter and this was removed yesterday she said the pain started after the catheter was removed and feels like it is burning in her lower abdomen to the urethra. She has a history of MRSA in the urine as well. Culture from July 2, 2020 she was positive for Citrobacter. She has been on Cipro since the procedure. Patient does have a history of MS and neurogenic bladder. No other complaints, modifying factors or associated symptoms. I have reviewed the following from the nursing documentation. Past Medical History:   Diagnosis Date    Anemia     [facility]     MRSA (methicillin resistant staph aureus) culture positive 11/24/2017    urine    MS (multiple sclerosis) ([facility])     Pulmonary embolism ([facility] [address]      Past Surgical History:   Procedure Laterality Date    FINGER AMPUTATION      TUNNELED VENOUS PORT PLACEMENT       History reviewed. No pertinent family history.   Social History     Socioeconomic History    Marital status: Single     Spouse name: Not on file    Number of children: Not on file    Years of education: Not on file    Highest education level: Not on file   Occupational History    Not on file   Social Needs    Financial resource strain: Not on file    Food insecurity     Worry: Not on file     Inability: Not on file    Transportation needs     Medical: Not on file     Non-medical: Not on file   Tobacco Use    Smoking status: Never Smoker    Smokeless tobacco: Never Used   Substance and Sexual Activity    Alcohol use: Yes     Comment: socially     famotidine (PEPCID) 20 MG tablet Take 40 mg by mouth daily       potassium chloride SA (K-DUR;KLOR-CON) 20 MEQ tablet Take 20 mEq by mouth daily       therapeutic multivitamin-minerals (THERAGRAN-M) tablet Take 1 tablet by mouth daily. Allergies   Allergen Reactions    Ambien [Zolpidem Tartrate]     Ampicillin     Bactrim     Morphine     Risperidone And Related     Vicodin [Hydrocodone-Acetaminophen]     Zoloft     Ciprofloxacin Rash       REVIEW OF SYSTEMS  10 systems reviewed, pertinent positives per HPI otherwise noted to be negative. PHYSICAL EXAM  /73   Pulse 86   Temp 97.6 °F (36.4 °C) (Oral)   Resp 16   Ht 5' 2\" (1.575 m)   Wt 115 lb (52.2 kg)   LMP 07/09/2020   SpO2 98%   BMI 21.03 kg/m²   GENERAL APPEARANCE: Awake and alert. Cooperative. No acute distress. HEAD: Normocephalic. Atraumatic. EYES: PERRL. EOM's grossly intact. ENT: Mucous membranes are moist.   NECK: Supple. HEART: RRR. CHEST/LUNGS: Chest atraumatic, nontender, respirations unlabored. CTAB. Good air exchange. Speaking comfortably in full sentences. BACK: No midline spinal tenderness or step-off. ABDOMEN: Soft. Non-distended. Non-tender. No guarding or rebound. Normal bowel sounds. EXTREMITIES: No peripheral edema. Moves all extremities equally. All extremities neurovascularly intact. RECTAL/: Deferred  SKIN: Warm and dry. No acute rashes. NEUROLOGICAL: Alert and oriented. CN 2-12 intact, No gross facial drooping. Strength 5/5, sensation intact. Normal coordination. LABS  I have reviewed all labs for this visit.    Results for orders placed or performed during the hospital encounter of 07/16/20   CBC Auto Differential   Result Value Ref Range    WBC 4.2 4.0 - 11.0 K/uL    RBC 4.15 4.00 - 5.20 M/uL    Hemoglobin 11.7 (L) 12.0 - 16.0 g/dL    Hematocrit 35.1 (L) 36.0 - 48.0 %    MCV 84.5 80.0 - 100.0 fL    MCH 28.2 26.0 - 34.0 pg    MCHC 33.4 31.0 - 36.0 g/dL    RDW 14.2 12.4 - 15.4 % Platelets 921 109 - 602 K/uL    MPV 9.0 5.0 - 10.5 fL    Neutrophils % 60.9 %    Lymphocytes % 25.4 %    Monocytes % 9.9 %    Eosinophils % 1.9 %    Basophils % 1.9 %    Neutrophils Absolute 2.6 1.7 - 7.7 K/uL    Lymphocytes Absolute 1.1 1.0 - 5.1 K/uL    Monocytes Absolute 0.4 0.0 - 1.3 K/uL    Eosinophils Absolute 0.1 0.0 - 0.6 K/uL    Basophils Absolute 0.1 0.0 - 0.2 K/uL   Comprehensive Metabolic Panel w/ Reflex to MG   Result Value Ref Range    Sodium 138 136 - 145 mmol/L    Potassium reflex Magnesium 3.4 (L) 3.5 - 5.1 mmol/L    Chloride 105 99 - 110 mmol/L    CO2 24 21 - 32 mmol/L    Anion Gap 9 3 - 16    Glucose 114 (H) 70 - 99 mg/dL    BUN 16 7 - 20 mg/dL    CREATININE <0.5 (L) 0.6 - 1.1 mg/dL    GFR Non-African American >60 >60    GFR African American >60 >60    Calcium 9.1 8.3 - 10.6 mg/dL    Total Protein 7.3 6.4 - 8.2 g/dL    Alb 3.8 3.4 - 5.0 g/dL    Albumin/Globulin Ratio 1.1 1.1 - 2.2    Total Bilirubin <0.2 0.0 - 1.0 mg/dL    Alkaline Phosphatase 70 40 - 129 U/L    ALT 19 10 - 40 U/L    AST 22 15 - 37 U/L    Globulin 3.5 g/dL   Lipase   Result Value Ref Range    Lipase 35.0 13.0 - 60.0 U/L   HCG Qualitative, Serum   Result Value Ref Range    hCG Qual Negative Detects HCG level >10 MIU/mL   Protime-INR   Result Value Ref Range    Protime 25.3 (H) 10.0 - 13.2 sec    INR 2.16 (H) 0.86 - 1.14   Magnesium   Result Value Ref Range    Magnesium 2.10 1.80 - 2.40 mg/dL   EKG 12 Lead   Result Value Ref Range    Ventricular Rate 87 BPM    Atrial Rate 87 BPM    P-R Interval 156 ms    QRS Duration 84 ms    Q-T Interval 376 ms    QTc Calculation (Bazett) 452 ms    P Axis 71 degrees    R Axis 12 degrees    T Axis 53 degrees    Diagnosis Normal sinus rhythmNormal ECG        EKG  EKG interpreted by me. Normal sinus rhythm with normal intervals,  No significant ST elevation or depression. ED COURSE/MDM  Patient seen and evaluated.   Patient here with hematuria and UTI do not think imaging is needed at this time lab work otherwise is unremarkable she is therapeutic on her Coumadin as well I do think she can just discharged to home and follow-up with her PCP and urologist.    All diagnostic tests reviewed and results discussed with patient. Plan of care discussed with patient. Patient in agreement with plan. New Prescriptions    CEFDINIR (OMNICEF) 300 MG CAPSULE    Take 1 capsule by mouth 2 times daily for 10 days    PHENAZOPYRIDINE (PYRIDIUM) 200 MG TABLET    Take 1 tablet by mouth 3 times daily as needed for Pain       CLINICAL IMPRESSION  1. Acute UTI        Blood pressure 100/73, pulse 86, temperature 97.6 °F (36.4 °C), temperature source Oral, resp. rate 16, height 5' 2\" (1.575 m), weight 115 lb (52.2 kg), last menstrual period 07/09/2020, SpO2 98 %. DISPOSITION  Ana Burton was discharged to home in stable condition. This chart was generated in part by using Dragon Dictation system and may contain errors related to that system including errors in grammar, punctuation, and spelling, as well as words and phrases that may be inappropriate. When dictating, effort is made to correct spelling/grammar errors. If there are any questions or concerns please feel free to contact the dictating provider for clarification.      J Luis Millan DO  ATTENDING, [address] [facility], DO  07/17/20 6334

## 2020-07-17 NOTE — ED NOTES
Bed: 05  Expected date:   Expected time:   Means of arrival:   Comments:  [facility]  07/16/20 9304

## 2020-07-18 LAB
ORGANISM: ABNORMAL
URINE CULTURE, ROUTINE: ABNORMAL

## 2020-07-27 ENCOUNTER — ANTI-COAG VISIT (OUTPATIENT)
Dept: PHARMACY | Age: 40
End: 2020-07-27
Payer: MEDICAID

## 2020-07-27 VITALS — TEMPERATURE: 98.2 F

## 2020-07-27 LAB — INTERNATIONAL NORMALIZATION RATIO, POC: 2.1

## 2020-07-27 PROCEDURE — 85610 PROTHROMBIN TIME: CPT

## 2020-07-27 PROCEDURE — 99211 OFF/OP EST MAY X REQ PHY/QHP: CPT

## 2020-07-27 NOTE — PROGRESS NOTES
Ms. Damaso Steele is a 42 y.o. y/o female with history of DVT, PE in about 2010. She presents today for anticoagulation monitoring and adjustment. Pertinent PMH: DVT assoc with PICC line  Hx of multiple sclerosis,   Patient Reported Findings:  Yes     No  [x]   []       Patient verifies current dosing regimen as listed---> confirms dose    []   [x]       S/S bleeding/bruising/swelling/SOB - denies   []   [x]       Blood in urine or stool -denies ---> blood in urine after procedure but normal per pt  [x]   []       Procedures scheduled in the future at this time - had procedure schedule 6/26. Was being bridged but procedure was cancelled. Was rescheduled for 7/9. Told to hold 5 days prior and bridge with lovenox. --> resumed warfarin 7/10 d/t post op bleeding. And lovenox bridge. Pt has botox procedure on 7/22, MD wants pt to hold 3-5 days prior. Will need to continue lovenox bridge    []   [x]       Missed Dose -held for procedures    []   [x]       Extra Dose -denies    [x]   []       Change in medications-using perc and tylenol prn for pain with new braces- states this will continue---> was given one dose of fluconazole, but was holding warfarin for procedure. Starting cefdinir on Sunday  --> started cipro 7/9, will finish this coming weekend ---> ceftin after procedure for 7 days, started 7/24, still has 3-4 days left, pyridium   [x]   []       Change in health/diet/appetite Normal vegetable is about twice a week and generally a low vitamin K. When she has broccoli it would be in a stir truong.  May have a salad only once a month.---> states she has not had vit k in a while---> has not had her greens recently, waiting on them to be made---> has not had greens, wants to add them back in---> had vegetables, no significant vit k ---> no changes         []   [x]       Change in alcohol use - denies ---> denies   []   [x]       Change in activity  []   [x]       Hospital admission   []   [x]       Emergency department visit- ER 7/16 for UTI- got cefdinir and phenazopyridine for 10 days  ER 7/21- abd pain and bloating, UTI  []   [x]       Other complaints       Clinical Outcomes:  Yes     No  []   [x]       Major bleeding event  []   [x]       Thromboembolic event    Duration of warfarin Therapy: Indefinitely  INR Range:  2.0-3.0     INR is 2.1 today    Procedure 7/22, stop lovenox. On ceftin for 4 more days. Continue taking 6.25mg daily. Encouraged to maintain a consistency of vegetables/salads. Recheck INR in 1 week, 8/3    Referring PCP is Dr. Sharda Proctor results  INR (no units)   Date Value   07/27/2020 2.1   07/17/2020 2.16 (H)   07/14/2020 1.2   07/01/2020 3.8   06/11/2020 3.55 (H)   05/20/2020 2.43 (H)   05/07/2020 4.44 (H)     CLINICAL PHARMACY CONSULT: MED RECONCILIATION/REVIEW ADDENDUM    For Pharmacy Admin Tracking Only    PHSO: No  Total # of Interventions Recommended: 1  - Updated Order #: 1 Updated Order Reason(s):  Other  - Maintenance Safety Lab Monitoring #: 1  Total Interventions Accepted: 1  Time Spent (min): Via Carlos Manuel El PharmD

## 2020-08-04 ENCOUNTER — TELEPHONE (OUTPATIENT)
Dept: PHARMACY | Age: 40
End: 2020-08-04

## 2020-08-05 ENCOUNTER — ANTI-COAG VISIT (OUTPATIENT)
Dept: PHARMACY | Age: 40
End: 2020-08-05
Payer: MEDICAID

## 2020-08-05 VITALS — TEMPERATURE: 98.1 F

## 2020-08-05 LAB — INTERNATIONAL NORMALIZATION RATIO, POC: 3.3

## 2020-08-05 PROCEDURE — 85610 PROTHROMBIN TIME: CPT

## 2020-08-05 PROCEDURE — 99211 OFF/OP EST MAY X REQ PHY/QHP: CPT

## 2020-08-05 NOTE — PROGRESS NOTES
Ms. Teja Poole is a 42 y.o. y/o female with history of DVT, PE in about 2010. She presents today for anticoagulation monitoring and adjustment. Pertinent PMH: DVT assoc with PICC line  Hx of multiple sclerosis,   Patient Reported Findings:  Yes     No  [x]   []       Patient verifies current dosing regimen as listed---> confirms dose    []   [x]       S/S bleeding/bruising/swelling/SOB - denies   []   [x]       Blood in urine or stool -denies  []   [x]       Procedures scheduled in the future at this time -    []   [x]       Missed Dose -held for procedures    []   [x]       Extra Dose -denies    []   [x]       Change in medications-using perc and tylenol prn for pain with new braces- states this will continue---> no changes   [x]   []       Change in health/diet/appetite Normal vegetable is about twice a week and generally a low vitamin K. When she has broccoli it would be in a stir truong. May have a salad only once a month.---> states she has not had vit k in a while---> has not had her greens recently, waiting on them to be made---> has not had greens, wants to add them back in---> had vegetables, no significant vit k ---> no greens lately        []   [x]       Change in alcohol use - denies ---> denies   []   [x]       Change in activity  []   [x]       Hospital admission   []   [x]       Emergency department visit- ER 7/16 for UTI- got cefdinir and phenazopyridine for 10 days  ER 7/21- abd pain and bloating, UTI  []   [x]       Other complaints       Clinical Outcomes:  Yes     No  []   [x]       Major bleeding event  []   [x]       Thromboembolic event    Duration of warfarin Therapy: Indefinitely  INR Range:  2.0-3.0     INR is 3.3 today    Take 3.75 mg tonight then continue taking 6.25mg daily. Encouraged to maintain a consistency of vegetables/salads.    Recheck INR in 2 weeks, 8/19    Referring PCP is Dr. Makeda Barrios results  INR (no units)   Date Value   08/05/2020 3.3   07/27/2020 2.1 07/17/2020 2.16 (H)   07/14/2020 1.2   07/01/2020 3.8   06/11/2020 3.55 (H)   05/20/2020 2.43 (H)   05/07/2020 4.44 (H)     CLINICAL PHARMACY CONSULT: MED RECONCILIATION/REVIEW ADDENDUM    For Pharmacy Admin Tracking Only    PHSO: No  Total # of Interventions Recommended: 1  - Decreased Dose #: 1  - Maintenance Safety Lab Monitoring #: 1  Total Interventions Accepted: 1  Time Spent (min): 15    Tiara Nelson PharmD

## 2020-08-10 ENCOUNTER — TELEPHONE (OUTPATIENT)
Dept: PHARMACY | Age: 40
End: 2020-08-10

## 2020-08-10 NOTE — TELEPHONE ENCOUNTER
----- Message from Anabell Addison sent at 8/10/2020 10:16 AM EDT -----  Regarding: New antibiotics  Patient called to schedule an INR check. She states she was in the ED over the weekend ([facility]) and was sent home with Cipro (7 days) and Vanco (2 wks) which she started yesterday. Patient was told to have her INR checked today but, she can't get in here today and scheduled for tomorrow @ 4:15p. Explained the importance of keeping this appt date/time to the patient.

## 2020-08-10 NOTE — TELEPHONE ENCOUNTER
Patient had recent ER visit over weekend for UTI and started cipro/vanc yesterday. Will adjust dose tomorrow at apt.      Maru Petit, PharmD, [facility]

## 2020-08-11 ENCOUNTER — ANTI-COAG VISIT (OUTPATIENT)
Dept: PHARMACY | Age: 40
End: 2020-08-11
Payer: MEDICAID

## 2020-08-11 VITALS — TEMPERATURE: 97.5 F

## 2020-08-11 LAB — INTERNATIONAL NORMALIZATION RATIO, POC: 1.7

## 2020-08-11 PROCEDURE — 99212 OFFICE O/P EST SF 10 MIN: CPT

## 2020-08-11 PROCEDURE — 85610 PROTHROMBIN TIME: CPT

## 2020-08-11 NOTE — PROGRESS NOTES
Ms. Adeel Martínez is a 42 y.o. y/o female with history of DVT, PE in about 2010. She presents today for anticoagulation monitoring and adjustment. Pertinent PMH: DVT assoc with PICC line  Hx of multiple sclerosis,   Patient Reported Findings:  Yes     No  [x]   []       Patient verifies current dosing regimen as listed---> confirms dose    []   [x]       S/S bleeding/bruising/swelling/SOB - denies   []   [x]       Blood in urine or stool -denies  []   [x]       Procedures scheduled in the future at this time -    []   [x]       Missed Dose -missed Sat, took 3.75mg on Wed 8/5  []   [x]       Extra Dose -denies    []   [x]       Change in medications-using perc and tylenol prn for pain with new braces- states this will continue---> no changes---> cipro for 7 days and vanc for 14 days, Singulair, mucinex     [x]   []       Change in health/diet/appetite Normal vegetable is about twice a week and generally a low vitamin K. When she has broccoli it would be in a stir truong. May have a salad only once a month.---> states she has not had vit k in a while---> has not had her greens recently, waiting on them to be made---> has not had greens, wants to add them back in---> had vegetables, no significant vit k ---> no greens lately ---> no greens, some diarrhea  []   [x]       Change in alcohol use - denies ---> denies   []   [x]       Change in activity  []   [x]       Hospital admission -admitted to [address] [place] [address] for UTI 8/7-8/8- got IV fluids/abx.    INR 8/7 2.8  INR 8/8 3.0  Discharged with cipro for 7 days and vanc po for 14 days   []   [x]       Emergency department visit- ER 7/16 for UTI- got cefdinir and phenazopyridine for 10 days  ER 7/21- abd pain and bloating, UTI  []   [x]       Other complaints       Clinical Outcomes:  Yes     No  []   [x]       Major bleeding event  []   [x]       Thromboembolic event    Duration of warfarin Therapy: Indefinitely  INR Range:  2.0-3.0     INR is 1.7 today dt missing dose on Sat and then taking 3.75mg on Wed. Since on 2 abx will not boost but will continue normal dose and check back in 2 weeks when done with abx to ensure INR comes back into range. Continue taking 6.25mg daily. Encouraged to maintain a consistency of vegetables/salads. Recheck INR in 2 weeks, 8/25    Referring PCP is Dr. Mehreen Woods results  INR (no units)   Date Value   08/11/2020 1.7   08/05/2020 3.3   07/27/2020 2.1   07/17/2020 2.16 (H)   07/14/2020 1.2   06/11/2020 3.55 (H)   05/20/2020 2.43 (H)   05/07/2020 4.44 (H)     CLINICAL PHARMACY CONSULT: MED RECONCILIATION/REVIEW ADDENDUM    For Pharmacy Admin Tracking Only    PHSO: No  Total # of Interventions Recommended: 2  - Updated Order #: 2 Updated Order Reason(s):  Other  - Maintenance Safety Lab Monitoring #: 1  Total Interventions Accepted: 2  Time Spent (min): Via Carlos Manuel El PharmD

## 2020-08-26 ENCOUNTER — TELEPHONE (OUTPATIENT)
Dept: PHARMACY | Age: 40
End: 2020-08-26

## 2020-08-28 ENCOUNTER — APPOINTMENT (OUTPATIENT)
Dept: PHARMACY | Age: 40
End: 2020-08-28
Payer: MEDICAID

## 2020-08-28 ENCOUNTER — ANTI-COAG VISIT (OUTPATIENT)
Dept: PHARMACY | Age: 40
End: 2020-08-28
Payer: MEDICAID

## 2020-08-28 VITALS — TEMPERATURE: 97.3 F

## 2020-08-28 LAB — INTERNATIONAL NORMALIZATION RATIO, POC: 2.5

## 2020-08-28 PROCEDURE — 99211 OFF/OP EST MAY X REQ PHY/QHP: CPT

## 2020-08-28 PROCEDURE — 85610 PROTHROMBIN TIME: CPT

## 2020-08-28 NOTE — PROGRESS NOTES
Ms. Zoya Capps is a 42 y.o. y/o female with history of DVT, PE in about 2010. She presents today for anticoagulation monitoring and adjustment. Pertinent PMH: DVT assoc with PICC line  Hx of multiple sclerosis,   Patient Reported Findings:  Yes     No  [x]   []       Patient verifies current dosing regimen as listed---> confirms dose    []   [x]       S/S bleeding/bruising/swelling/SOB - denies   []   [x]       Blood in urine or stool -denies  []   [x]       Procedures scheduled in the future at this time -    []   [x]       Missed Dose - denies  []   [x]       Extra Dose -denies    []   [x]       Change in medications-using perc and tylenol prn for pain with new braces- states this will continue---> no changes---> cipro for 7 days and vanc for 14 days, Singulair, mucinex  - finished Sunday patient thinks, no other med changes  [x]   []       Change in health/diet/appetite Normal vegetable is about twice a week and generally a low vitamin K. When she has broccoli it would be in a stir truong. May have a salad only once a month.---> states she has not had vit k in a while---> has not had her greens recently, waiting on them to be made---> has not had greens, wants to add them back in---> had vegetables, no significant vit k ---> no greens lately ---> no greens, no diarrhea   []   [x]       Change in alcohol use - denies ---> denies   []   [x]       Change in activity  []   [x]       Hospital admission -admitted to [facility] for UTI 8/7-8/8- got IV fluids/abx.    INR 8/7 2.8  INR 8/8 3.0  Discharged with cipro for 7 days and vanc po for 14 days   []   [x]       Emergency department visit- ER 7/16 for UTI- got cefdinir and phenazopyridine for 10 days  ER 7/21- abd pain and bloating, UTI  []   [x]       Other complaints       Clinical Outcomes:  Yes     No  []   [x]       Major bleeding event  []   [x]       Thromboembolic event    Duration of warfarin Therapy: Indefinitely  INR Range:  2.0-3.0     INR is 2.5   Continue
EASY BRUISING

## 2020-09-18 ENCOUNTER — ANTI-COAG VISIT (OUTPATIENT)
Dept: PHARMACY | Age: 40
End: 2020-09-18
Payer: MEDICAID

## 2020-09-18 VITALS — TEMPERATURE: 96.8 F

## 2020-09-18 LAB — INTERNATIONAL NORMALIZATION RATIO, POC: 5.3

## 2020-09-18 PROCEDURE — 85610 PROTHROMBIN TIME: CPT

## 2020-09-18 PROCEDURE — 99212 OFFICE O/P EST SF 10 MIN: CPT

## 2020-09-18 NOTE — PROGRESS NOTES
Ms. Nataly Friedman is a 42 y.o. y/o female with history of DVT, PE in about 2010. She presents today for anticoagulation monitoring and adjustment. Pertinent PMH: DVT assoc with PICC line  Hx of multiple sclerosis,   Patient Reported Findings:  Yes     No  [x]   []       Patient verifies current dosing regimen as listed---> confirms dose    []   [x]       S/S bleeding/bruising/swelling/SOB - denies   [x]   []       Blood in urine or stool -had blood in urine for a day or two but has stopped   []   [x]       Procedures scheduled in the future at this time -    []   [x]       Missed Dose - denies  []   [x]       Extra Dose -denies    [x]   []       Change in medications-using perc and tylenol prn for pain with new braces- states this will continue---> increased potassium. Has been taking more tylenol than normal for last week   []   [x]       Change in health/diet/appetite Normal vegetable is about twice a week and generally a low vitamin K. When she has broccoli it would be in a stir truong. May have a salad only once a month.---> had vegetables, no significant vit k ---> no greens lately ---> no greens, no v/d   []   [x]       Change in alcohol use denies  []   [x]       Change in activity  []   [x]       Hospital admission -  []   [x]       Emergency department visit-  []   [x]       Other complaints       Clinical Outcomes:  Yes     No  []   [x]       Major bleeding event  []   [x]       Thromboembolic event    Duration of warfarin Therapy: Indefinitely  INR Range:  2.0-3.0     INR is 5.3 today after more tylenol than normal  Hold dose tonight and tomorrow then decrease weekly dose to 5 mg on Mon and Fri and 6.25 mg all other days of the week (5.7% dec)  Encouraged to maintain a consistency of vegetables/salads.    Recheck INR in 2 weeks, 10/1 d/t pt being out of town until then    Referring PCP is Dr. Tomy Fajardo results  INR (no units)   Date Value   08/28/2020 2.5   08/11/2020 1.7   08/05/2020 3.3 07/27/2020 2.1   07/17/2020 2.16 (H)   06/11/2020 3.55 (H)   05/20/2020 2.43 (H)   05/07/2020 4.44 (H)     CLINICAL PHARMACY CONSULT: MED RECONCILIATION/REVIEW ADDENDUM    For Pharmacy Admin Tracking Only    PHSO: No  Total # of Interventions Recommended: 1  - Decreased Dose #: 1  - Maintenance Safety Lab Monitoring #: 1  Total Interventions Accepted: 1  Time Spent (min): 15    Marylene Self, PharmD

## 2020-09-19 ENCOUNTER — HOSPITAL ENCOUNTER (EMERGENCY)
Age: 40
Discharge: HOME | End: 2020-09-20
Attending: EMERGENCY MEDICINE
Payer: MEDICAID

## 2020-09-19 ENCOUNTER — APPOINTMENT (OUTPATIENT)
Dept: CT IMAGING | Age: 40
End: 2020-09-19
Payer: MEDICAID

## 2020-09-19 VITALS
OXYGEN SATURATION: 97 % | SYSTOLIC BLOOD PRESSURE: 115 MMHG | BODY MASS INDEX: 22.08 KG/M2 | DIASTOLIC BLOOD PRESSURE: 75 MMHG | WEIGHT: 120 LBS | HEART RATE: 90 BPM | RESPIRATION RATE: 15 BRPM | HEIGHT: 62 IN | TEMPERATURE: 98.5 F

## 2020-09-19 LAB
A/G RATIO: 1 (ref 1.1–2.2)
ALBUMIN SERPL-MCNC: 3.7 G/DL (ref 3.4–5)
ALP BLD-CCNC: 87 U/L (ref 40–129)
ALT SERPL-CCNC: <5 U/L (ref 10–40)
ANION GAP SERPL CALCULATED.3IONS-SCNC: 11 MMOL/L (ref 3–16)
APTT: 46.4 SEC (ref 24.2–36.2)
AST SERPL-CCNC: 19 U/L (ref 15–37)
BASOPHILS ABSOLUTE: 0 K/UL (ref 0–0.2)
BASOPHILS RELATIVE PERCENT: 0.5 %
BILIRUB SERPL-MCNC: 0.4 MG/DL (ref 0–1)
BUN BLDV-MCNC: 13 MG/DL (ref 7–20)
CALCIUM SERPL-MCNC: 9.1 MG/DL (ref 8.3–10.6)
CHLORIDE BLD-SCNC: 110 MMOL/L (ref 99–110)
CO2: 19 MMOL/L (ref 21–32)
CREAT SERPL-MCNC: <0.5 MG/DL (ref 0.6–1.1)
EOSINOPHILS ABSOLUTE: 0.1 K/UL (ref 0–0.6)
EOSINOPHILS RELATIVE PERCENT: 2.2 %
GFR AFRICAN AMERICAN: >60
GFR NON-AFRICAN AMERICAN: >60
GLOBULIN: 3.7 G/DL
GLUCOSE BLD-MCNC: 92 MG/DL (ref 70–99)
HCG QUALITATIVE: NEGATIVE
HCT VFR BLD CALC: 35.8 % (ref 36–48)
HEMOGLOBIN: 12.1 G/DL (ref 12–16)
INR BLD: 3.22 (ref 0.86–1.14)
LYMPHOCYTES ABSOLUTE: 0.8 K/UL (ref 1–5.1)
LYMPHOCYTES RELATIVE PERCENT: 15.6 %
MAGNESIUM: 2.2 MG/DL (ref 1.8–2.4)
MCH RBC QN AUTO: 28.4 PG (ref 26–34)
MCHC RBC AUTO-ENTMCNC: 33.8 G/DL (ref 31–36)
MCV RBC AUTO: 84.1 FL (ref 80–100)
MONOCYTES ABSOLUTE: 0.6 K/UL (ref 0–1.3)
MONOCYTES RELATIVE PERCENT: 11 %
NEUTROPHILS ABSOLUTE: 3.7 K/UL (ref 1.7–7.7)
NEUTROPHILS RELATIVE PERCENT: 70.7 %
PDW BLD-RTO: 13.8 % (ref 12.4–15.4)
PLATELET # BLD: 326 K/UL (ref 135–450)
PMV BLD AUTO: 8.4 FL (ref 5–10.5)
POTASSIUM REFLEX MAGNESIUM: 3.1 MMOL/L (ref 3.5–5.1)
PROTHROMBIN TIME: 37.8 SEC (ref 10–13.2)
RBC # BLD: 4.25 M/UL (ref 4–5.2)
SODIUM BLD-SCNC: 140 MMOL/L (ref 136–145)
TOTAL PROTEIN: 7.4 G/DL (ref 6.4–8.2)
WBC # BLD: 5.3 K/UL (ref 4–11)

## 2020-09-19 PROCEDURE — 85610 PROTHROMBIN TIME: CPT

## 2020-09-19 PROCEDURE — 80053 COMPREHEN METABOLIC PANEL: CPT

## 2020-09-19 PROCEDURE — 84703 CHORIONIC GONADOTROPIN ASSAY: CPT

## 2020-09-19 PROCEDURE — 85025 COMPLETE CBC W/AUTO DIFF WBC: CPT

## 2020-09-19 PROCEDURE — 2580000003 HC RX 258: Performed by: NURSE PRACTITIONER

## 2020-09-19 PROCEDURE — 83735 ASSAY OF MAGNESIUM: CPT

## 2020-09-19 PROCEDURE — 85730 THROMBOPLASTIN TIME PARTIAL: CPT

## 2020-09-19 PROCEDURE — 74176 CT ABD & PELVIS W/O CONTRAST: CPT

## 2020-09-19 PROCEDURE — 99284 EMERGENCY DEPT VISIT MOD MDM: CPT

## 2020-09-19 RX ORDER — LIDOCAINE 4 G/G
1 PATCH TOPICAL ONCE
Status: DISCONTINUED | OUTPATIENT
Start: 2020-09-19 | End: 2020-09-20 | Stop reason: HOSPADM

## 2020-09-19 RX ORDER — CEFUROXIME AXETIL 250 MG/1
250 TABLET ORAL 2 TIMES DAILY
Qty: 14 TABLET | Refills: 0 | Status: SHIPPED | OUTPATIENT
Start: 2020-09-19 | End: 2020-09-26

## 2020-09-19 RX ORDER — TAMSULOSIN HYDROCHLORIDE 0.4 MG/1
0.4 CAPSULE ORAL DAILY
Qty: 5 CAPSULE | Refills: 0 | Status: SHIPPED | OUTPATIENT
Start: 2020-09-19 | End: 2022-05-03

## 2020-09-19 RX ORDER — 0.9 % SODIUM CHLORIDE 0.9 %
1000 INTRAVENOUS SOLUTION INTRAVENOUS ONCE
Status: COMPLETED | OUTPATIENT
Start: 2020-09-19 | End: 2020-09-19

## 2020-09-19 RX ADMIN — SODIUM CHLORIDE 1000 ML: 9 INJECTION, SOLUTION INTRAVENOUS at 13:36

## 2020-09-19 ASSESSMENT — ENCOUNTER SYMPTOMS
NAUSEA: 0
VOMITING: 0
BLOOD IN STOOL: 0
SORE THROAT: 0
RHINORRHEA: 0
ABDOMINAL PAIN: 0
SHORTNESS OF BREATH: 0
DIARRHEA: 0
CONSTIPATION: 0

## 2020-09-19 ASSESSMENT — PAIN DESCRIPTION - ORIENTATION: ORIENTATION: RIGHT

## 2020-09-19 ASSESSMENT — PAIN SCALES - GENERAL: PAINLEVEL_OUTOF10: 8

## 2020-09-19 ASSESSMENT — PAIN DESCRIPTION - DESCRIPTORS: DESCRIPTORS: DULL;ACHING

## 2020-09-19 NOTE — ED NOTES
Pt verbalized understanding of discharge instructions, pt states she requires a private transport as she does not ambulated due to [address].      Abdirashid Vazquez RN  09/19/20 [id number]

## 2020-09-19 NOTE — ED PROVIDER NOTES
[facility]        Pt Name: Damaso Steele  MRN: [id number]  [place] 1980  Date of evaluation: 9/19/2020  Provider: Harlan Najjar, APRN - CNP  PCP: Josefina Guerin    This patient was seen and evaluated by the attending physician Vale Silva MD.    [facility]       Chief Complaint   Patient presents with    Flank Pain     Pt. states she has pain in her right side for 3 days, denies any fever, n/v.       HISTORY OF PRESENT ILLNESS   (Location/Symptom, Timing/Onset,Context/Setting, Quality, Duration, Modifying Factors, Severity)  Note limiting factors. Damaso Steele is a 42 y.o. female who presents emergency department with concern for right flank pain. She is concerned she may have a kidney stone. Reports that she had one back in July. She reports that she sees a urologist at [facility] for this. She started having symptoms this past week. She gave a urine sample on the 15th. The urologist called her and reports that there is nothing for them to treat at this time. She continues to have the flank pain, however and came to the ER for evaluation. She denies fever, rash, headaches, dizziness, chest pain, shortness of breath, cough, congestion, abdominal pain, nausea, vomiting, diarrhea, constipation, blood in the stool, or painful urination. No family at bedside. Nursing Notes triage note reviewed and agreed with or any disagreements were addressed  in the HPI. REVIEW OF SYSTEMS    (2-9 systems for level 4, 10 or more for level 5)     Review of Systems   Constitutional: Negative for chills and fever. HENT: Negative for postnasal drip, rhinorrhea and sore throat. Eyes: Negative for visual disturbance. Respiratory: Negative for shortness of breath. Cardiovascular: Negative for chest pain.    Gastrointestinal: Negative for abdominal pain, blood in stool, constipation, diarrhea, nausea and (THERAGRAN-M) TABLET    Take 1 tablet by mouth daily. TROSPIUM (SANCTURA) 20 MG TABLET    Take 20 mg by mouth 2 times daily    WARFARIN (COUMADIN) 2.5 MG TABLET    Take 2.5 mg by mouth See Admin Instructions Unsure of dose sequence         ALLERGIES     Ambien [zolpidem tartrate]; Ampicillin; Bactrim; Morphine; Risperidone and related; Vicodin [hydrocodone-acetaminophen]; and Zoloft    FAMILY HISTORY     History reviewed. No pertinent family history.        SOCIAL HISTORY       Social History     Socioeconomic History    Marital status: Single     Spouse name: None    Number of children: None    Years of education: None    Highest education level: None   Occupational History    None   Social Needs    Financial resource strain: None    Food insecurity     Worry: None     Inability: None    Transportation needs     Medical: None     Non-medical: None   Tobacco Use    Smoking status: Never Smoker    Smokeless tobacco: Never Used   Substance and Sexual Activity    Alcohol use: Yes     Comment: socially    Drug use: No    Sexual activity: None   Lifestyle    Physical activity     Days per week: None     Minutes per session: None    Stress: None   Relationships    Social connections     Talks on phone: None     Gets together: None     Attends Latter-day service: None     Active member of club or organization: None     Attends meetings of clubs or organizations: None     Relationship status: None    Intimate partner violence     Fear of current or ex partner: None     Emotionally abused: None     Physically abused: None     Forced sexual activity: None   Other Topics Concern    None   Social History Narrative    None       SCREENINGS             PHYSICAL EXAM  (up to 7 for level 4, 8 or more for level 5)     ED Triage Vitals [09/19/20 1129]   BP Temp Temp Source Pulse Resp SpO2 Height Weight   119/78 98.4 °F (36.9 °C) Oral 82 16 98 % 5' 2\" (1.575 m) 120 lb (54.4 kg)       Physical Exam  Vitals signs and nursing note reviewed. Constitutional:       General: She is not in acute distress. Appearance: Normal appearance. She is well-developed. She is not diaphoretic. HENT:      Head: Normocephalic and atraumatic. Eyes:      General: No scleral icterus. Right eye: No discharge. Left eye: No discharge. Neck:      Musculoskeletal: Normal range of motion and neck supple. Cardiovascular:      Rate and Rhythm: Normal rate and regular rhythm. Heart sounds: Normal heart sounds. No murmur. No friction rub. No gallop. Pulmonary:      Effort: Pulmonary effort is normal. No respiratory distress. Breath sounds: Normal breath sounds. No stridor. No wheezing or rales. Chest:      Chest wall: No tenderness. Abdominal:      General: Bowel sounds are normal. There is no distension. Palpations: Abdomen is soft. There is no mass. Tenderness: There is no abdominal tenderness. There is right CVA tenderness. There is no left CVA tenderness, guarding or rebound. Musculoskeletal: Normal range of motion. General: No tenderness. Skin:     General: Skin is warm and dry. Coloration: Skin is not pale. Neurological:      Mental Status: She is alert and oriented to person, place, and time.       Coordination: Coordination normal.   Psychiatric:         Behavior: Behavior normal.         DIAGNOSTIC RESULTS   LABS:    Labs Reviewed   CBC WITH AUTO DIFFERENTIAL - Abnormal; Notable for the following components:       Result Value    Hematocrit 35.8 (*)     Lymphocytes Absolute 0.8 (*)     All other components within normal limits    Narrative:     Performed at:  [facility] [address], [place], [facility] [address]   Phone [phone]   COMPREHENSIVE METABOLIC PANEL W/ REFLEX TO MG FOR LOW K - Abnormal; Notable for the following components:    Potassium reflex Magnesium 3.1 (*)     CO2 19 (*)     CREATININE <0.5 (*)     Albumin/Globulin Ratio 1.0 (*)     ALT <5 (*)     All other components within normal limits    Narrative:     Performed at:  [facility]  [address], [place] [employer]   Phone [phone]   PROTIME-INR - Abnormal; Notable for the following components:    Protime 37.8 (*)     INR 3.22 (*)     All other components within normal limits    Narrative:     Performed at:  [facility] [address], [place] [employer]   Phone [phone]   APTT - Abnormal; Notable for the following components:    aPTT 46.4 (*)     All other components within normal limits    Narrative:     Performed at:  [facility] [address], [place] [employer]   Phone [phone]   HCG, SERUM, QUALITATIVE    Narrative:     Performed at:  [facility] [address] [place] [address], [place] [employer]   Phone [phone]   MAGNESIUM    Narrative:     Performed at:  [facility] [address] [place],  [address], [place] [employer]   Phone [phone]   URINE RT REFLEX TO CULTURE       All other labs werewithin normal range or not returned as of this dictation. EKG: All EKG's are interpreted by the Emergency Department Physician who either signs or Co-signs this chart in the absence of acardiologist.  Please see their note for interpretation of EKG. RADIOLOGY:   Interpretation per the Radiologist below, if available at the time of this note:    CT ABDOMEN PELVIS WO CONTRAST Additional Contrast? None   Final Result   1. 4 mm calculus in the distal left ureter with moderate   hydroureteronephrosis. 2. Additional nonobstructing calculi in the left kidney. No results found. PROCEDURES   Unless otherwise noted below, none     Procedures    CRITICAL CARE TIME     There was a high probability of life-threatening clinical deterioration in the patient's condition requiring my urgent intervention.  The total critical care time spent while evaluating and treating this patient was at least 0 minutes. This excludes time spent doing separately billable procedures. This includes time at the bedside, data interpretation, medication management, obtaining critical history from collateral sources if the patient is unable to provide it directly, and physician consultation. Specifics of interventions taken and potentially life-threatening diagnostic considerations are listed in the medical decision making. CONSULTS:  None      EMERGENCY DEPARTMENT COURSE and DIFFERENTIAL DIAGNOSIS/MDM:   Vitals:    Vitals:    09/19/20 1129   BP: 119/78   Pulse: 82   Resp: 16   Temp: 98.4 °F (36.9 °C)   TempSrc: Oral   SpO2: 98%   Weight: 120 lb (54.4 kg)   Height: 5' 2\" (1.575 m)       Reilly Arshad was given the following medications:  Medications   lidocaine 4 % external patch 1 patch (1 patch Transdermal Not Given 9/19/20 1311)   0.9 % sodium chloride bolus (1,000 mLs Intravenous New Bag 9/19/20 1336)       Reilly Arshad was evaluated in the emergency department with concern for right flank pain. Identified a 4 mm ureteral stone on the right. Treated with IV fluids and Lidoderm pain patch for this. No evidence of infection at this time. There is no evidence of infected stone or renal dysfunction on labs. My suspicion is low for torsion, STDs, pyelonephritis, perinephric abscess, cauda equina, DINA, or epidural abscess. The patient was instructed to push fluids at home and follow up with urology as needed. Reilly Arshad is stable in the ER and safe to follow as an outpatient. The patient is discharged on the following medications. They were counseled on how to take the newly prescribed medications:  New Prescriptions    CEFUROXIME (CEFTIN) 250 MG TABLET    Take 1 tablet by mouth 2 times daily for 7 days    TAMSULOSIN (FLOMAX) 0.4 MG CAPSULE    Take 1 capsule by mouth daily for 5 doses    .       Instructed to follow-up with:  LUBA Rubalcava NP, [address] [facility]. [facility]  [phone]    Schedule an appointment as soon as possible for a visit in 3 days  For a recheck    Return to the ER for new or worsening symptoms. The patient tolerated their visit well. They were seen and evaluated by the ED attending physician listed on this chart who agreed with the assessment and plan. The patient and / or the family were informed of the results of any tests, a time was given to answer questions, a plan was proposed and they agreed with plan. FINAL IMPRESSION      1.  Ureterolithiasis          DISPOSITION/PLAN   DISPOSITION Decision To Discharge 09/19/2020 03:39:08 PM        DISCONTINUED MEDICATIONS:  Discontinued Medications    No medications on file                (Please note that portions of this note were completed with a voice recognition program.  Efforts were made to edit the dictations but occasionally words are mis-transcribed.)    LUBA James CNP (electronically signed)        LUBA James CNP  09/19/20 6664

## 2020-09-19 NOTE — ED NOTES
Bed: 08  Expected date:   Expected time:   Means of arrival:   Comments:  Vladimir Fishman RN  09/19/20 5040

## 2020-09-21 NOTE — ED PROVIDER NOTES
As physician-in-triage, I performed a medical screening history and physical exam on Radha Liu. I also cared for and evaluated the patient in conjunction with the ED Advanced Practice Provider. All diagnostic, treatment, and disposition decisions were made by myself in conjunction with the advanced practice provider. For all further details of the patient's emergency department visit, please see the advanced practice provider's documentation. Patient presents the ER for evaluation of 4 mm distal left ureteral calculi, she is afebrile, she does have a urologist with she will follow-up outpatient analgesia was provided, prophylactic oral antibiotics home pain medication is adequate for the patient she states, she is return if she is worse or new symptoms.         Impression: Left flank pain, distal 4 mm ureteral calculus     Adelaide Wilkins MD  56/60/03 6982

## 2020-09-28 ENCOUNTER — TELEPHONE (OUTPATIENT)
Dept: PHARMACY | Age: 40
End: 2020-09-28

## 2020-09-28 NOTE — TELEPHONE ENCOUNTER
Pt returned call. States that MD told her to call clinic to be assessed. Explained that has INR appt on Thurs 10/1. Asked if would like to be checked in clinic sooner since INR was supratherapeutic last visit but pt states that she is boarding a plane and will call back. States that she missed dose Thursday.

## 2020-10-02 ENCOUNTER — TELEPHONE (OUTPATIENT)
Dept: PHARMACY | Age: 40
End: 2020-10-02

## 2020-10-05 ENCOUNTER — ANTI-COAG VISIT (OUTPATIENT)
Dept: PHARMACY | Age: 40
End: 2020-10-05
Payer: MEDICAID

## 2020-10-05 VITALS — TEMPERATURE: 97.7 F

## 2020-10-05 LAB
INR BLD: 8.93 (ref 0.86–1.14)
PROTHROMBIN TIME: 105.9 SEC (ref 10–13.2)

## 2020-10-05 PROCEDURE — 85610 PROTHROMBIN TIME: CPT

## 2020-10-05 PROCEDURE — 99213 OFFICE O/P EST LOW 20 MIN: CPT

## 2020-10-05 PROCEDURE — 36415 COLL VENOUS BLD VENIPUNCTURE: CPT

## 2020-10-05 NOTE — PROGRESS NOTES
Ms. Martha Reynolds is a 47 y.o. y/o female with history of DVT, PE in about 2010. She presents today for anticoagulation monitoring and adjustment. Pertinent PMH: DVT assoc with PICC line  Hx of multiple sclerosis,   Patient Reported Findings:  Yes     No  [x]   []       Patient verifies current dosing regimen as listed---> confirms dose    [x]   []       S/S bleeding/bruising/swelling/SOB - pt called recently with hematoma. Missed last appt to assess INR. States today that it is improving    []   [x]       Blood in urine or stool -   []   [x]       Procedures scheduled in the future at this time -    []   [x]       Missed Dose - denies  []   [x]       Extra Dose -denies    [x]   []       Change in medications-using perc and tylenol prn for pain with new braces- states this will continue---> increased potassium. Has been taking more tylenol than normal for last week --> takes tylenol prn. No abx or other med changes   [x]   []       Change in health/diet/appetite Normal vegetable is about twice a week and generally a low vitamin K. When she has broccoli it would be in a stir truong. May have a salad only once a month.---> had vegetables, no significant vit k ---> had greens twice since last visit. States that she had done a detox with tea (haven and turmeric tea)     [x]   []       Change in alcohol use adamant that she did not drink alcohol while in [facility]   []   [x]       Change in activity  []   [x]       Hospital admission -  [x]   []       Emergency department visit- was in ED on 9/19 for kidney pain. pt urologist was contacted who states there is nothing to treat pt currently. Was d/c with ceftin and flomax. no interaction. []   [x]       Other complaints       Clinical Outcomes:  Yes     No  []   [x]       Major bleeding event  []   [x]       Thromboembolic event    Duration of warfarin Therapy: Indefinitely  INR Range:  2.0-3.0     Called RN who manages warfarin dosing.  She states that other RN filled pillbox but followed paperwork. Pt adamantly denies any changes. Likely has been change in the past few weeks to cause INR to increase so significantly. Pt has missed appts as well to have INR checked. Unable to state any changes. Advised pt to think about possible changes and can discuss at next appt. Will need to monitor closely d/t not having etiology for INR     INR is >8 today, requiring a venipuncture labdraw   INR via venipuncture is 8.93  Hold dose for next 3 days   Encouraged to maintain a consistency of vegetables/salads. Recheck INR on Thurs 10/8.  Pt states that she might not be able to make it on Thursday, she will call    Referring PCP is Dr. Nino Marcial results  INR (no units)   Date Value   10/05/2020 8.93 (HH)   09/19/2020 3.22 (H)   09/18/2020 5.3   08/28/2020 2.5   08/11/2020 1.7   08/05/2020 3.3   07/17/2020 2.16 (H)   06/11/2020 3.55 (H)     CLINICAL PHARMACY CONSULT: [facility] Tracking Only    PHSO: No  Total # of Interventions Recommended: 1  - Decreased Dose #: 1  - Maintenance Safety Lab Monitoring #: 1  Total Interventions Accepted: 1  Time Spent (min): 60    Lukasz AguayoD

## 2020-10-08 ENCOUNTER — ANTI-COAG VISIT (OUTPATIENT)
Dept: PHARMACY | Age: 40
End: 2020-10-08
Payer: MEDICAID

## 2020-10-08 VITALS — TEMPERATURE: 98.6 F

## 2020-10-08 LAB — INTERNATIONAL NORMALIZATION RATIO, POC: 4.7

## 2020-10-08 PROCEDURE — 85610 PROTHROMBIN TIME: CPT

## 2020-10-08 PROCEDURE — 99212 OFFICE O/P EST SF 10 MIN: CPT

## 2020-10-08 NOTE — PROGRESS NOTES
Ms. Reuben Vernon is a 47 y.o. y/o female with history of DVT, PE in about 2010. She presents today for anticoagulation monitoring and adjustment. Pertinent PMH: DVT assoc with PICC line  Hx of multiple sclerosis,   Patient Reported Findings:  Yes     No  [x]   []       Patient verifies current dosing regimen as listed---> confirms dose    []   [x]       S/S bleeding/bruising/swelling/SOB - pt called recently with hematoma. Missed last appt to assess INR. States today that it is improving --> denies   []   [x]       Blood in urine or stool - denies   []   [x]       Procedures scheduled in the future at this time -    []   [x]       Missed Dose - denies  []   [x]       Extra Dose -denies    [x]   []       Change in medications-using perc and tylenol prn for pain with new braces- states this will continue---> increased potassium. Has been taking more tylenol than normal for last week --> takes tylenol prn. No abx or other med changes   [x]   []       Change in health/diet/appetite Normal vegetable is about twice a week and generally a low vitamin K. When she has broccoli it would be in a stir truong. May have a salad only once a month.---> had vegetables, no significant vit k ---> had greens twice since last visit. States that she had done a detox with tea (haven and turmeric tea) --> had broccoli once since mon     [x]   []       Change in alcohol use adamant that she did not drink alcohol while in [place] --> states now that she had one glass of alcohol in [facility]. none recently   []   [x]       Change in activity  []   [x]       Hospital admission -  [x]   []       Emergency department visit- was in ED on 9/19 for kidney pain. pt urologist was contacted who states there is nothing to treat pt currently. Was d/c with ceftin and flomax. no interaction.    []   [x]       Other complaints       Clinical Outcomes:  Yes     No  []   [x]       Major bleeding event  []   [x]       Thromboembolic event    Duration of warfarin Therapy: Indefinitely  INR Range:  2.0-3.0     Called RN who manages warfarin dosing. She states that other RN filled pillbox but followed paperwork. Pt adamantly denies any changes. Likely has been change in the past few weeks to cause INR to increase so significantly. Pt has missed appts as well to have INR checked. Unable to state any changes. Advised pt to think about possible changes and can discuss at next appt. Will need to monitor closely d/t not having etiology for INR     INR is 4.7 today   After holding warfarin for the past 3 days   Still unclear as to why INR has remained supratherapeutic. Hold dose tonight and tomorrow then decrease weekly dose to 5 mg on Mon, Wed and Fri and 3.75 mg all other days of the week (27% dec)  Encouraged to maintain a consistency of vegetables/salads.    Recheck INR on Thurs 10/15 as states she is unavailable prior to that     Referring PCP is Dr. Gary Clayton results  INR (no units)   Date Value   10/05/2020 8.93 (HH)   09/19/2020 3.22 (H)   09/18/2020 5.3   08/28/2020 2.5   08/11/2020 1.7   08/05/2020 3.3   07/17/2020 2.16 (H)   06/11/2020 3.55 (H)     CLINICAL PHARMACY CONSULT: [facility] Tracking Only    PHSO: No  Total # of Interventions Recommended: 1  - Decreased Dose #: 1  - Maintenance Safety Lab Monitoring #: 1  Total Interventions Accepted: 1  Time Spent (min): 30    Abraham Marlow, LukaszD

## 2020-10-15 ENCOUNTER — ANTI-COAG VISIT (OUTPATIENT)
Dept: PHARMACY | Age: 40
End: 2020-10-15
Payer: MEDICAID

## 2020-10-15 VITALS — TEMPERATURE: 98.6 F

## 2020-10-15 LAB — INTERNATIONAL NORMALIZATION RATIO, POC: 2

## 2020-10-15 PROCEDURE — 99211 OFF/OP EST MAY X REQ PHY/QHP: CPT

## 2020-10-15 PROCEDURE — 85610 PROTHROMBIN TIME: CPT

## 2020-10-15 NOTE — PROGRESS NOTES
Ms. Ramona Morin is a 47 y.o. y/o female with history of DVT, PE in about 2010. She presents today for anticoagulation monitoring and adjustment. Pertinent PMH: DVT assoc with PICC line  Hx of multiple sclerosis,   Patient Reported Findings:  Yes     No  [x]   []       Patient verifies current dosing regimen as listed---> confirms dose, uses AVS     []   [x]       S/S bleeding/bruising/swelling/SOB - pt called recently with hematoma. Missed last appt to assess INR. States today that it is improving --> denies   []   [x]       Blood in urine or stool - denies   []   [x]       Procedures scheduled in the future at this time -    []   [x]       Missed Dose - denies  []   [x]       Extra Dose -denies    [x]   []       Change in medications-using perc and tylenol prn for pain with new braces- states this will continue---> increased potassium. Has been taking more tylenol than normal for last week --> takes tylenol prn. No abx or other med changes --> macrobid started yesterday no interaction  [x]   []       Change in health/diet/appetite Normal vegetable is about twice a week and generally a low vitamin K. When she has broccoli it would be in a stir truong. May have a salad only once a month.---> had vegetables, no significant vit k ---> had greens twice since last visit. States that she had done a detox with tea (haven and turmeric tea) --> had broccoli once since mon--> no NVD, no change    [x]   []       Change in alcohol use adamant that she did not drink alcohol while in [place] --> states now that she had one glass of alcohol in [facility]. none recently  []   [x]       Change in activity  []   [x]       Hospital admission -  [x]   []       Emergency department visit- was in ED on 9/19 for kidney pain. pt urologist was contacted who states there is nothing to treat pt currently. Was d/c with ceftin and flomax. no interaction.    []   [x]       Other complaints -->seen 10/15 for UTI, given macrobid  No interaction    Clinical Outcomes:  Yes     No  []   [x]       Major bleeding event  []   [x]       Thromboembolic event    Duration of warfarin Therapy: Indefinitely  INR Range:  2.0-3.0     Called RN who manages warfarin dosing. She states that other RN filled pillbox but followed paperwork. Pt adamantly denies any changes. Likely has been change in the past few weeks to cause INR to increase so significantly. Pt has missed appts as well to have INR checked. Unable to state any changes. Advised pt to think about possible changes and can discuss at next appt. Will need to monitor closely d/t not having etiology for INR     INR is 2.0 today after held doses 10/8 and 10/9   Continue taking 5 mg on Mon, Wed and Fri and 3.75 mg all other days of the week  Encouraged to maintain a consistency of vegetables/salads.    Recheck INR on Tuesday 10/27 as states she is unavailable prior to that     Referring PCP is Dr. Justus Young results  INR (no units)   Date Value   10/15/2020 2.0   10/08/2020 4.7   10/05/2020 8.93 (HH)   09/19/2020 3.22 (H)   09/18/2020 5.3   08/28/2020 2.5   07/17/2020 2.16 (H)   06/11/2020 3.55 (H)     CLINICAL PHARMACY CONSULT: MED RECONCILIATION/REVIEW ADDENDUM    For Pharmacy Admin Tracking Only    PHSO: No  Total # of Interventions Recommended: 0  - Maintenance Safety Lab Monitoring #: 1  Total Interventions Accepted: 0  Time Spent (min): [address], PharmD

## 2020-10-26 ENCOUNTER — TELEPHONE (OUTPATIENT)
Dept: PHARMACY | Age: 40
End: 2020-10-26

## 2020-10-26 NOTE — TELEPHONE ENCOUNTER
Patient called to report being started on Diflucan (single dose taken on Vasile 10/25). Provider requested INR be checked 5 days after taking med.  Appt made for Thursday 10/29 @ 10:15

## 2020-10-27 ENCOUNTER — TELEPHONE (OUTPATIENT)
Dept: PHARMACY | Age: 40
End: 2020-10-27

## 2020-10-27 NOTE — TELEPHONE ENCOUNTER
Pt called clinic and LVM asking what dose to take tonight and tomorrow since r/s appt to Thurs. Returned call and LVM advising pt to take 3.75 mg tonight and tomorrow. Will assess INR Thursday.

## 2020-10-29 ENCOUNTER — TELEPHONE (OUTPATIENT)
Dept: PHARMACY | Age: 40
End: 2020-10-29

## 2020-10-29 NOTE — TELEPHONE ENCOUNTER
Patient left voicemail to cancel appt for 10/29: returned call and left voicemail for patient to call back to r/s    Patient called back to r/s appt: weather permitting (patient is wheelchair bound) she will be in today 10/29 @ 4:15.  If unable to make it today a secondary appt was scheduled for 11/2 @ 3:45

## 2020-11-02 ENCOUNTER — ANTI-COAG VISIT (OUTPATIENT)
Dept: PHARMACY | Age: 40
End: 2020-11-02
Payer: MEDICAID

## 2020-11-02 VITALS — TEMPERATURE: 98.7 F

## 2020-11-02 LAB — INTERNATIONAL NORMALIZATION RATIO, POC: 1.4

## 2020-11-02 PROCEDURE — 85610 PROTHROMBIN TIME: CPT

## 2020-11-02 PROCEDURE — 99211 OFF/OP EST MAY X REQ PHY/QHP: CPT

## 2020-11-02 NOTE — PROGRESS NOTES
Ms. Montez Gill is a 47 y.o. y/o female with history of DVT, PE in about 2010. She presents today for anticoagulation monitoring and adjustment. Pertinent PMH: DVT assoc with PICC line  Hx of multiple sclerosis,   Patient Reported Findings:  Yes     No  [x]   []       Patient verifies current dosing regimen as listed---> confirms dose, uses AVS     []   [x]       S/S bleeding/bruising/swelling/SOB - pt called recently with hematoma. Missed last appt to assess INR. States today that it is improving --> denies   []   [x]       Blood in urine or stool - denies   []   [x]       Procedures scheduled in the future at this time -    []   [x]       Missed Dose - denies  []   [x]       Extra Dose -denies    [x]   []       Change in medications-using perc and tylenol prn for pain with new braces- states this will continue---> increased potassium. Has been taking more tylenol than normal for last week --> takes tylenol prn. No abx or other med changes --> macrobid started yesterday no interaction  [x]   []       Change in health/diet/appetite Normal vegetable is about twice a week and generally a low vitamin K. When she has broccoli it would be in a stir truong. May have a salad only once a month.---> had vegetables, no significant vit k ---> had greens twice since last visit. States that she had done a detox with tea (haven and turmeric tea) --> had broccoli once since mon--> no NVD, no change --> greens 2-3 times this last week, no NVD   [x]   []       Change in alcohol use adamant that she did not drink alcohol while in [place] --> states now that she had one glass of alcohol in [facility]. none recently  []   [x]       Change in activity  []   [x]       Hospital admission -  [x]   []       Emergency department visit- was in ED on 9/19 for kidney pain. pt urologist was contacted who states there is nothing to treat pt currently. Was d/c with ceftin and flomax. no interaction.    []   [x]       Other complaints -->seen 10/15 for UTI, given macrobid  No interaction    Clinical Outcomes:  Yes     No  []   [x]       Major bleeding event  []   [x]       Thromboembolic event    Duration of warfarin Therapy: Indefinitely  INR Range:  2.0-3.0     Called RN who manages warfarin dosing. She states that other RN filled pillbox but followed paperwork. Pt adamantly denies any changes. Likely has been change in the past few weeks to cause INR to increase so significantly. Pt has missed appts as well to have INR checked. Unable to state any changes. Advised pt to think about possible changes and can discuss at next appt. Will need to monitor closely d/t not having etiology for INR     INR is 1.4 today possibly d/t increase in greens over the last week  Patient has historically required a larger weekly dose to maintain a therapeutic level. Take 6.25mg tonight, then increase dose to 3.75mg on Sun and Thurs and 5mg all other days. Encouraged to maintain a consistency of vegetables/salads.    Recheck INR in 2 weeks, 11/16    Referring PCP is Dr. Sera Boone results  INR (no units)   Date Value   11/02/2020 1.4   10/15/2020 2.0   10/08/2020 4.7   10/05/2020 8.93 (HH)   09/19/2020 3.22 (H)   09/18/2020 5.3   07/17/2020 2.16 (H)   06/11/2020 3.55 (H)       CLINICAL PHARMACY CONSULT: [facility] Tracking Only    PHSO: No  Total # of Interventions Recommended: 1  - Increased Dose #: 1  - Maintenance Safety Lab Monitoring #: 1  Total Interventions Accepted: 1  Time Spent (min): 15    Hodan Tavares, LukaszD

## 2020-11-16 ENCOUNTER — ANTI-COAG VISIT (OUTPATIENT)
Dept: PHARMACY | Age: 40
End: 2020-11-16
Payer: MEDICAID

## 2020-11-16 VITALS — TEMPERATURE: 96.9 F

## 2020-11-16 LAB — INTERNATIONAL NORMALIZATION RATIO, POC: 2.3

## 2020-11-16 PROCEDURE — 99211 OFF/OP EST MAY X REQ PHY/QHP: CPT

## 2020-11-16 PROCEDURE — 85610 PROTHROMBIN TIME: CPT

## 2020-11-16 NOTE — PROGRESS NOTES
Ms. Ashvin Chaves is a 47 y.o. y/o female with history of DVT, PE in about 2010. She presents today for anticoagulation monitoring and adjustment. Pertinent PMH: DVT assoc with PICC line  Hx of multiple sclerosis,   Patient Reported Findings:  Yes     No  [x]   []       Patient verifies current dosing regimen as listed---> confirms dose, uses AVS     []   [x]       S/S bleeding/bruising/swelling/SOB - pt called recently with hematoma. Missed last appt to assess INR. States today that it is improving --> denies   []   [x]       Blood in urine or stool - denies   [x]   []       Procedures scheduled in the future at this time -  Having botox procedure for bladder end of dec  []   [x]       Missed Dose - denies  []   [x]       Extra Dose -denies    []   [x]       Change in medications-using perc and tylenol prn for pain with new braces- states this will continue---> increased potassium. Has been taking more tylenol than normal for last week --> takes tylenol prn. No abx or other med changes --> macrobid started yesterday no interaction --> no changes   []   [x]       Change in health/diet/appetite Normal vegetable is about twice a week and generally a low vitamin K. When she has broccoli it would be in a stir truong. May have a salad only once a month.---> had vegetables, no significant vit k ---> had greens twice since last visit. States that she had done a detox with tea (haven and turmeric tea) -->no NVD, no change --> greens 2-3 times this last week, no NVD   []   [x]       Change in alcohol use adamant that she did not drink alcohol while in [place] --> states now that she had one glass of alcohol in [facility].  none recently --> no changes   []   [x]       Change in activity  []   [x]       Hospital admission -  []   [x]       Emergency department visit-  []   [x]       Other complaints   Clinical Outcomes:  Yes     No  []   [x]       Major bleeding event  []   [x]       Thromboembolic event    Duration of warfarin Therapy: Indefinitely  INR Range:  2.0-3.0     INR is 2.3 today   Continue dose of 3.75mg on Sun and Thurs and 5mg all other days. Encouraged to maintain a consistency of vegetables/salads.    Recheck INR in 3 weeks, 12/7    Referring PCP is Dr. Leno Gore results  INR (no units)   Date Value   11/02/2020 1.4   10/15/2020 2.0   10/08/2020 4.7   10/05/2020 8.93 (HH)   09/19/2020 3.22 (H)   09/18/2020 5.3   07/17/2020 2.16 (H)   06/11/2020 3.55 (H)       CLINICAL PHARMACY CONSULT: [facility] Tracking Only    PHSO: No  Total # of Interventions Recommended: 0  - Maintenance Safety Lab Monitoring #: 1  Total Interventions Accepted: 0  Time Spent (min): 15    Barrett Ash, LukaszD

## 2020-12-08 ENCOUNTER — TELEPHONE (OUTPATIENT)
Dept: PHARMACY | Age: 40
End: 2020-12-08

## 2020-12-08 NOTE — TELEPHONE ENCOUNTER
Patient called to say she was in the ED @ [facility] and cannot come in for her appt today. Patient rescheduled for 12/9.

## 2020-12-09 ENCOUNTER — ANTI-COAG VISIT (OUTPATIENT)
Dept: PHARMACY | Age: 40
End: 2020-12-09
Payer: MEDICAID

## 2020-12-09 VITALS — TEMPERATURE: 98.4 F

## 2020-12-09 LAB — INTERNATIONAL NORMALIZATION RATIO, POC: 1.6

## 2020-12-09 PROCEDURE — 85610 PROTHROMBIN TIME: CPT

## 2020-12-09 PROCEDURE — 99211 OFF/OP EST MAY X REQ PHY/QHP: CPT

## 2020-12-09 NOTE — PROGRESS NOTES
Ms. Daniela Mcginnis is a 47 y.o. y/o female with history of DVT, PE in about 2010. She presents today for anticoagulation monitoring and adjustment. Pertinent PMH: DVT assoc with PICC line  Hx of multiple sclerosis,     Patient Reported Findings:  Yes     No  [x]   []       Patient verifies current dosing regimen as listed---> confirms dose, uses AVS     []   [x]       S/S bleeding/bruising/swelling/SOB - pt called recently with hematoma. Missed last appt to assess INR. States today that it is improving --> denies   []   [x]       Blood in urine or stool - denies   [x]   []       Procedures scheduled in the future at this time -  Having botox procedure for bladder end of dec --> scheduled 12/30, unsure if holding at this time  []   [x]       Missed Dose - denies  []   [x]       Extra Dose -denies    []   [x]       Change in medications-using perc and tylenol prn for pain with new braces- states this will continue---> increased potassium. Has been taking more tylenol than normal for last week --> takes tylenol prn. No abx or other med changes --> macrobid started yesterday no interaction --> 10 cefdinir course for UTI, added oxybutynin   []   [x]       Change in health/diet/appetite Normal vegetable is about twice a week and generally a low vitamin K. When she has broccoli it would be in a stir truong. May have a salad only once a month.---> had vegetables, no significant vit k ---> had greens twice since last visit. States that she had done a detox with tea (haven and turmeric tea) -->no NVD, no change --> greens 2-3 times this last week, no NVD --> More greens than normal the past two weeks, no NVD   []   [x]       Change in alcohol use adamant that she did not drink alcohol while in [place] --> states now that she had one glass of alcohol in [facility].  none recently --> no changes   []   [x]       Change in activity  []   [x]       Hospital admission -  []   [x]       Emergency department visit-  []   [x] Other complaints   Clinical Outcomes:  Yes     No  []   [x]       Major bleeding event  []   [x]       Thromboembolic event    Duration of warfarin Therapy: Indefinitely  INR Range:  2.0-3.0     INR is 1.6 today d/t more greens lately  Take 7.5mg tonight, then continue dose of 3.75mg on Sun and Thurs and 5mg all other days. Encouraged to maintain a consistency of vegetables/salads. Recheck INR in 2 weeks, 12/23 - Patient has procedure scheduled for 12/30, unsure if she will be holding at this time.     Referring PCP is Dr. Bhavesh Linares results  INR (no units)   Date Value   12/09/2020 1.6   11/16/2020 2.3   11/02/2020 1.4   10/15/2020 2.0   10/05/2020 8.93 (HH)   09/19/2020 3.22 (H)   07/17/2020 2.16 (H)   06/11/2020 3.55 (H)     CLINICAL PHARMACY CONSULT: [facility] Tracking Only    PHSO: No  Total # of Interventions Recommended: 1  - Increased Dose #: 1  - Maintenance Safety Lab Monitoring #: 1  Total Interventions Accepted: 1  Time Spent (min): 15    Simin Black, LukaszD

## 2020-12-23 ENCOUNTER — ANTI-COAG VISIT (OUTPATIENT)
Dept: PHARMACY | Age: 40
End: 2020-12-23
Payer: MEDICAID

## 2020-12-23 LAB — INTERNATIONAL NORMALIZATION RATIO, POC: 1.7

## 2020-12-23 PROCEDURE — 85610 PROTHROMBIN TIME: CPT

## 2020-12-23 PROCEDURE — 99211 OFF/OP EST MAY X REQ PHY/QHP: CPT

## 2020-12-23 NOTE — PROGRESS NOTES
Ms. Kali Nevarez is a 47 y.o. y/o female with history of DVT, PE in about 2010. She presents today for anticoagulation monitoring and adjustment. Pertinent PMH: DVT assoc with PICC line  Hx of multiple sclerosis,     Patient Reported Findings:  Yes     No  [x]   []       Patient verifies current dosing regimen as listed---> confirms dose, uses AVS    []   [x]       S/S bleeding/bruising/swelling/SOB -denies   []   [x]       Blood in urine or stool - denies   [x]   []       Procedures scheduled in the future at this time -  Having botox procedure for bladder end of dec --> scheduled 12/30, unsure if holding at this time - asked patient to call us after she finds out. []   [x]       Missed Dose - denies  []   [x]       Extra Dose -denies    []   [x]       Change in medications-using perc and tylenol prn for pain with new braces- states this will continue---> increased potassium. Has been taking more tylenol than normal for last week --> takes tylenol prn. No abx or other med changes --> macrobid started yesterday no interaction --> 10 cefdinir course for UTI, added oxybutynin ---> None  []   [x]       Change in health/diet/appetite Normal vegetable is about twice a week and generally a low vitamin K. When she has broccoli it would be in a stir truong. May have a salad only once a month.---> had vegetables, no significant vit k ---> had greens twice since last visit. States that she had done a detox with tea (haven and turmeric tea) -->no NVD, no change --> greens 2-3 times this last week, no NVD --> No change, no NVD. []   [x]       Change in alcohol use adamant that she did not drink alcohol while in [place] --> states now that she had one glass of alcohol in [facility].  none recently --> no changes   []   [x]       Change in activity  []   [x]       Hospital admission -  []   [x]       Emergency department visit-  []   [x]       Other complaints   Clinical Outcomes:  Yes     No  []   [x]       Major bleeding

## 2020-12-28 ENCOUNTER — TELEPHONE (OUTPATIENT)
Dept: PHARMACY | Age: 40
End: 2020-12-28

## 2021-01-06 ENCOUNTER — ANTI-COAG VISIT (OUTPATIENT)
Dept: PHARMACY | Age: 41
End: 2021-01-06
Payer: MEDICAID

## 2021-01-06 VITALS — TEMPERATURE: 97 F

## 2021-01-06 DIAGNOSIS — Z79.01 LONG TERM (CURRENT) USE OF ANTICOAGULANTS: ICD-10-CM

## 2021-01-06 DIAGNOSIS — I26.09 PULMONARY EMBOLISM WITH ACUTE COR PULMONALE, UNSPECIFIED CHRONICITY, UNSPECIFIED PULMONARY EMBOLISM TYPE (HCC): ICD-10-CM

## 2021-01-06 DIAGNOSIS — Z29.9 DVT PROPHYLAXIS: ICD-10-CM

## 2021-01-06 LAB — INTERNATIONAL NORMALIZATION RATIO, POC: 1.1

## 2021-01-06 PROCEDURE — 85610 PROTHROMBIN TIME: CPT

## 2021-01-06 PROCEDURE — 99211 OFF/OP EST MAY X REQ PHY/QHP: CPT

## 2021-01-06 NOTE — PROGRESS NOTES
Ms. Paola Clifton is a 47 y.o. y/o female with history of DVT, PE in about 2010. She presents today for anticoagulation monitoring and adjustment. Pertinent PMH: DVT assoc with PICC line  Hx of multiple sclerosis,     Patient Reported Findings:  Yes     No  [x]   []       Patient verifies current dosing regimen as listed---> confirms dose, uses AVS    []   [x]       S/S bleeding/bruising/swelling/SOB -denies   []   [x]       Blood in urine or stool - denies   []   [x]       Procedures scheduled in the future at this time -  Having botox procedure for bladder end of dec --> scheduled 12/30, unsure if holding at this time - asked patient to call us after she finds out. [x]   []       Missed Dose -  1/1 and 1/2  []   [x]       Extra Dose -denies    []   [x]       Change in medications-using perc and tylenol prn for pain with new braces- states this will continue---> increased potassium. Has been taking more tylenol than normal for last week --> takes tylenol prn. No abx or other med changes --> macrobid started yesterday no interaction --> 10 cefdinir course for UTI, added oxybutynin ---> None  []   [x]       Change in health/diet/appetite Normal vegetable is about twice a week and generally a low vitamin K. When she has broccoli it would be in a stir truong. May have a salad only once a month.---> had vegetables, no significant vit k ---> had greens twice since last visit. States that she had done a detox with tea (haven and turmeric tea) -->no NVD, no change --> greens 2-3 times this last week, no NVD --> No change, no NVD. []   [x]       Change in alcohol use adamant that she did not drink alcohol while in [place] --> states now that she had one glass of alcohol in [facility].  none recently --> no changes   []   [x]       Change in activity  []   [x]       Hospital admission -  []   [x]       Emergency department visit-  []   [x]       Other complaints   Clinical Outcomes:  Yes     No  []   [x]       Major bleeding event  []   [x]       Thromboembolic event    Duration of warfarin Therapy: Indefinitely  INR Range:  2.0-3.0     INR is 1.1 today due to holding for procedure and missed doses  Take 10 mg today, 7.5 mg tomorrow, then resume weekly dose to 3.75mg on Sun and 5mg all other days. Encouraged to maintain a consistency of vegetables/salads.    Recheck INR in 1 week, 1/20 due to patient being out of town     Referring PCP is Dr. Aj Tena results  INR (no units)   Date Value   01/06/2021 1.1   12/23/2020 1.7   12/09/2020 1.6   11/16/2020 2.3   10/05/2020 8.93 (HH)   09/19/2020 3.22 (H)   07/17/2020 2.16 (H)   06/11/2020 3.55 (H)     CLINICAL PHARMACY CONSULT: MED RECONCILIATION/REVIEW ADDENDUM    For Pharmacy Admin Tracking Only    PHSO: No  Total # of Interventions Recommended: 2  - Increased Dose #: 2  - Maintenance Safety Lab Monitoring #: 1  Total Interventions Accepted: 2  Time Spent (min): [address], PharmD

## 2021-01-21 ENCOUNTER — TELEPHONE (OUTPATIENT)
Dept: PHARMACY | Age: 41
End: 2021-01-21

## 2021-01-22 ENCOUNTER — ANTI-COAG VISIT (OUTPATIENT)
Dept: PHARMACY | Age: 41
End: 2021-01-22
Payer: MEDICAID

## 2021-01-22 VITALS — TEMPERATURE: 97.1 F

## 2021-01-22 DIAGNOSIS — Z29.9 DVT PROPHYLAXIS: ICD-10-CM

## 2021-01-22 DIAGNOSIS — Z79.01 LONG TERM (CURRENT) USE OF ANTICOAGULANTS: ICD-10-CM

## 2021-01-22 DIAGNOSIS — I26.09 PULMONARY EMBOLISM WITH ACUTE COR PULMONALE, UNSPECIFIED CHRONICITY, UNSPECIFIED PULMONARY EMBOLISM TYPE (HCC): ICD-10-CM

## 2021-01-22 LAB — INTERNATIONAL NORMALIZATION RATIO, POC: 3.9

## 2021-01-22 PROCEDURE — 85610 PROTHROMBIN TIME: CPT

## 2021-01-22 PROCEDURE — 99212 OFFICE O/P EST SF 10 MIN: CPT

## 2021-01-22 RX ORDER — MONTELUKAST SODIUM 10 MG/1
10 TABLET ORAL NIGHTLY
COMMUNITY
Start: 2020-11-10

## 2021-01-22 RX ORDER — PANTOPRAZOLE SODIUM 40 MG/1
1 TABLET, DELAYED RELEASE ORAL DAILY
COMMUNITY
Start: 2021-01-06

## 2021-01-22 RX ORDER — MAGNESIUM OXIDE 400 MG/1
400 TABLET ORAL EVERY EVENING
COMMUNITY
Start: 2020-11-05

## 2021-01-22 NOTE — TELEPHONE ENCOUNTER
Warfarin prescription phoned in and LM on VM to [address] [phone] under Dr. Vidal Taylor ([phone])  Warfarin 2.5 mg tabs  Take 3.75 mg on Sun and 2.5 mg all other days of the week  90 days   2 refills

## 2021-01-22 NOTE — PROGRESS NOTES
Ms. Jessica Deleon is a 47 y.o. y/o female with history of DVT, PE in about 2010. She presents today for anticoagulation monitoring and adjustment. Pertinent PMH: DVT assoc with PICC line  Hx of multiple sclerosis,     Patient Reported Findings:  Yes     No  [x]   []       Patient verifies current dosing regimen as listed---> uses AVS   []   [x]       S/S bleeding/bruising/swelling/SOB -denies   []   [x]       Blood in urine or stool - denies   []   [x]       Procedures scheduled in the future at this time -  Having botox procedure for bladder end of dec --> scheduled 12/30, unsure if holding at this time - asked patient to call us after she finds out.  --> none upcoming   []   [x]       Missed Dose -     []   [x]       Extra Dose -denies    [x]   []       Change in medications-using perc and tylenol prn for pain with new braces- states this will continue---> takes tylenol prn. No abx or other med changes ---> updated med list as pt takes magnesium, vit d, pantoprazole, and singulair  [x]   []       Change in health/diet/appetite Normal vegetable is about twice a week and generally a low vitamin K. When she has broccoli it would be in a stir truong. May have a salad only once a month.---> had vegetables, no significant vit k ---> had greens twice since last visit. States that she had done a detox with tea (haven and turmeric tea) --> greens 2-3 times this last week, no NVD -->  had food poisoning last week, significant vomiting and diarrhea   []   [x]       Change in alcohol use adamant that she did not drink alcohol while in [place] --> states now that she had one glass of alcohol in [facility].  none recently --> no changes   []   [x]       Change in activity  []   [x]       Hospital admission -  []   [x]       Emergency department visit-  []   [x]       Other complaints   Clinical Outcomes:  Yes     No  []   [x]       Major bleeding event  []   [x]       Thromboembolic event    Duration of warfarin Therapy: Indefinitely  INR Range:  2.0-3.0     INR is 3.9 today for unclear etiology    Hold dose tonight then continue weekly dose of 3.75mg on Sun and 5mg all other days. Encouraged to maintain a consistency of vegetables/salads. Sent refills for warfarin to [facility] pharmacy   Recheck INR in 10 days, 2/3    Referring PCP is Dr. Arely Argueta results  INR (no units)   Date Value   01/06/2021 1.1   12/23/2020 1.7   12/09/2020 1.6   11/16/2020 2.3   10/05/2020 8.93 (HH)   09/19/2020 3.22 (H)   07/17/2020 2.16 (H)   06/11/2020 3.55 (H)     CLINICAL PHARMACY CONSULT: MED RECONCILIATION/REVIEW ADDENDUM    For Pharmacy Admin Tracking Only    PHSO: No  Total # of Interventions Recommended: 6  - Decreased Dose #: 1  - Refills Provided #: 1  - Updated Order #: 4 Updated Order Reason(s):  Other  - Maintenance Safety Lab Monitoring #: 1  Total Interventions Accepted: 6  Time Spent (min): 15    Buck Hernadez, LukaszD

## 2021-02-04 ENCOUNTER — TELEPHONE (OUTPATIENT)
Dept: PHARMACY | Age: 41
End: 2021-02-04

## 2021-02-08 ENCOUNTER — ANTI-COAG VISIT (OUTPATIENT)
Dept: PHARMACY | Age: 41
End: 2021-02-08
Payer: MEDICAID

## 2021-02-08 DIAGNOSIS — I26.99 PULMONARY EMBOLISM, UNSPECIFIED CHRONICITY, UNSPECIFIED PULMONARY EMBOLISM TYPE, UNSPECIFIED WHETHER ACUTE COR PULMONALE PRESENT (HCC): ICD-10-CM

## 2021-02-08 DIAGNOSIS — Z29.9 DVT PROPHYLAXIS: ICD-10-CM

## 2021-02-08 DIAGNOSIS — Z79.01 LONG TERM (CURRENT) USE OF ANTICOAGULANTS: ICD-10-CM

## 2021-02-08 LAB — INTERNATIONAL NORMALIZATION RATIO, POC: 1.8

## 2021-02-08 PROCEDURE — 85610 PROTHROMBIN TIME: CPT

## 2021-02-08 PROCEDURE — 99211 OFF/OP EST MAY X REQ PHY/QHP: CPT

## 2021-02-08 NOTE — PROGRESS NOTES
Ms. Gary Ellis is a 47 y.o. y/o female with history of DVT, PE in about 2010. She presents today for anticoagulation monitoring and adjustment. Pertinent PMH: DVT assoc with PICC line  Hx of multiple sclerosis,     Patient Reported Findings:  Yes     No  [x]   []       Patient verifies current dosing regimen as listed---> uses AVS   []   [x]       S/S bleeding/bruising/swelling/SOB -denies   []   [x]       Blood in urine or stool - denies   []   [x]       Procedures scheduled in the future at this time -  Having botox procedure for bladder end of dec --> scheduled 12/30, unsure if holding at this time - asked patient to call us after she finds out.  --> none upcoming   []   [x]       Missed Dose - out of town 1/29-2/1 and missed doses while out of town (missed flight)    []   [x]       Extra Dose -denies    [x]   []       Change in medications-using perc and tylenol prn for pain with new braces- states this will continue---> takes tylenol prn. No abx or other med changes ---> updated med list as pt takes magnesium, vit d, pantoprazole, and singulair---> tylenol and midol with cramps   [x]   []       Change in health/diet/appetite Normal vegetable is about twice a week and generally a low vitamin K. When she has broccoli it would be in a stir truong. May have a salad only once a month.---> had vegetables, no significant vit k ---> had greens twice since last visit. States that she had done a detox with tea (haven and turmeric tea) --> greens 2-3 times this last week, no NVD -->  had food poisoning last week, significant vomiting and diarrhea---> no changes, cramps and diarrhea with menstrual cycle this week    []   [x]       Change in alcohol use adamant that she did not drink alcohol while in [place] --> states now that she had one glass of alcohol in [facility].  none recently --> no changes   []   [x]       Change in activity  []   [x]       Hospital admission -  []   [x]       Emergency department visit-  [] [x]       Other complaints   Clinical Outcomes:  Yes     No  []   [x]       Major bleeding event  []   [x]       Thromboembolic event    Duration of warfarin Therapy: Indefinitely  INR Range:  2.0-3.0     INR is 1.8 today dt missed doses 1/29-2/1. Continue weekly dose of 3.75mg on Sun and 5mg all other days. Encouraged to maintain a consistency of vegetables/salads.    Recheck INR in 2 weeks, 2/22    Referring PCP is Dr. Albert Sports results  INR (no units)   Date Value   02/08/2021 1.8   01/22/2021 3.9   01/06/2021 1.1   12/23/2020 1.7   10/05/2020 8.93 (HH)   09/19/2020 3.22 (H)   07/17/2020 2.16 (H)   06/11/2020 3.55 (H)     CLINICAL PHARMACY CONSULT: MED RECONCILIATION/REVIEW [place]  22. Tracking Only    PHSO: No  Total # of Interventions Recommended: 0  - Maintenance Safety Lab Monitoring #: 1  Total Interventions Accepted: 0  Time Spent (min): [address], PharmD

## 2021-02-22 ENCOUNTER — ANTI-COAG VISIT (OUTPATIENT)
Dept: PHARMACY | Age: 41
End: 2021-02-22
Payer: MEDICAID

## 2021-02-22 VITALS — TEMPERATURE: 97.1 F

## 2021-02-22 DIAGNOSIS — Z79.01 LONG TERM (CURRENT) USE OF ANTICOAGULANTS: ICD-10-CM

## 2021-02-22 DIAGNOSIS — I26.99 PULMONARY EMBOLISM, UNSPECIFIED CHRONICITY, UNSPECIFIED PULMONARY EMBOLISM TYPE, UNSPECIFIED WHETHER ACUTE COR PULMONALE PRESENT (HCC): ICD-10-CM

## 2021-02-22 DIAGNOSIS — Z29.9 DVT PROPHYLAXIS: ICD-10-CM

## 2021-02-22 LAB — INTERNATIONAL NORMALIZATION RATIO, POC: 5

## 2021-02-22 PROCEDURE — 99211 OFF/OP EST MAY X REQ PHY/QHP: CPT

## 2021-02-22 PROCEDURE — 85610 PROTHROMBIN TIME: CPT

## 2021-02-22 NOTE — PROGRESS NOTES
Ms. Kiran Easton is a 47 y.o. y/o female with history of DVT, PE in about 2010. She presents today for anticoagulation monitoring and adjustment. Pertinent PMH: DVT assoc with PICC line  Hx of multiple sclerosis,     Patient Reported Findings:  Yes     No  [x]   []       Patient verifies current dosing regimen as listed---> uses AVS   []   [x]       S/S bleeding/bruising/swelling/SOB -denies   []   [x]       Blood in urine or stool - denies   []   [x]       Procedures scheduled in the future at this time -  Having botox procedure for bladder end of dec --> scheduled 12/30, unsure if holding at this time - asked patient to call us after she finds out.  --> none upcoming   []   [x]       Missed Dose -  []   [x]       Extra Dose - may have had extra doses over the last week but not sure. [x]   []       Change in medications-using perc and tylenol prn for pain with new braces- states this will continue---> takes tylenol prn. No abx or other med changes ---> updated med list as pt takes magnesium, vit d, pantoprazole, and singulair---> tylenol and midol with cramps --> some tylenol recently. Did take ceftin for UTI recently. [x]   []       Change in health/diet/appetite Normal vegetable is about twice a week and generally a low vitamin K. When she has broccoli it would be in a stir truong. May have a salad only once a month.---> had vegetables, no significant vit k ---> had greens twice since last visit. States that she had done a detox with tea (haven and turmeric tea) --> greens 2-3 times this last week, no NVD -->  had food poisoning last week, significant vomiting and diarrhea---> no changes, cramps and diarrhea with menstrual cycle this week --> no greens recently d/t lack of appetite / diarrhea   []   [x]       Change in alcohol use adamant that she did not drink alcohol while in [place] --> states now that she had one glass of alcohol in [facility].  none recently --> no changes   []   [x]       Change in

## 2021-03-08 ENCOUNTER — TELEPHONE (OUTPATIENT)
Dept: PHARMACY | Age: 41
End: 2021-03-08

## 2021-03-10 ENCOUNTER — APPOINTMENT (OUTPATIENT)
Dept: PHARMACY | Age: 41
End: 2021-03-10
Payer: MEDICAID

## 2021-03-11 ENCOUNTER — ANTI-COAG VISIT (OUTPATIENT)
Dept: PHARMACY | Age: 41
End: 2021-03-11
Payer: MEDICAID

## 2021-03-11 DIAGNOSIS — Z79.01 LONG TERM (CURRENT) USE OF ANTICOAGULANTS: ICD-10-CM

## 2021-03-11 DIAGNOSIS — I26.99 PULMONARY EMBOLISM, UNSPECIFIED CHRONICITY, UNSPECIFIED PULMONARY EMBOLISM TYPE, UNSPECIFIED WHETHER ACUTE COR PULMONALE PRESENT (HCC): ICD-10-CM

## 2021-03-11 DIAGNOSIS — Z29.9 DVT PROPHYLAXIS: ICD-10-CM

## 2021-03-11 LAB — INTERNATIONAL NORMALIZATION RATIO, POC: 1.5

## 2021-03-11 PROCEDURE — 99212 OFFICE O/P EST SF 10 MIN: CPT

## 2021-03-11 PROCEDURE — 85610 PROTHROMBIN TIME: CPT

## 2021-03-11 NOTE — PROGRESS NOTES
Ms. Lorne Radford is a 47 y.o. y/o female with history of DVT, PE in about 2010. She presents today for anticoagulation monitoring and adjustment. Pertinent PMH: DVT assoc with PICC line  Hx of multiple sclerosis,     Patient Reported Findings:  Yes     No  [x]   []       Patient verifies current dosing regimen as listed---> uses AVS   []   [x]       S/S bleeding/bruising/swelling/SOB -denies   []   [x]       Blood in urine or stool - denies   []   [x]       Procedures scheduled in the future at this time -  Having botox procedure for bladder end of dec --> scheduled 12/30, unsure if holding at this time - asked patient to call us after she finds out.  --> none upcoming   [x]   []       Missed Dose - missed 2 doses, does not know days, within the last week   []   [x]       Extra Dose - may have had extra doses over the last week but not sure. []   [x]       Change in medications-using perc and tylenol prn for pain with new braces- states this will continue---> takes tylenol prn. No abx or other med changes ---> updated med list as pt takes magnesium, vit d, pantoprazole, and singulair---> tylenol and midol with cramps --> some tylenol recently. Did take ceftin for UTI recently. --> no changes   [x]   []       Change in health/diet/appetite Normal vegetable is about twice a week and generally a low vitamin K. When she has broccoli it would be in a stir truong. May have a salad only once a month.---> had vegetables, no significant vit k ---> had greens twice since last visit.  States that she had done a detox with tea (haven and turmeric tea) --> greens 2-3 times this last week, no NVD -->  had food poisoning last week, significant vomiting and diarrhea---> no changes, cramps and diarrhea with menstrual cycle this week --> no greens recently d/t lack of appetite / diarrhea --> continues with little vit k   []   [x]       Change in alcohol use adamant that she did not drink alcohol while in [place] --> states

## 2021-03-24 ENCOUNTER — TELEPHONE (OUTPATIENT)
Dept: PHARMACY | Age: 41
End: 2021-03-24

## 2021-03-24 NOTE — TELEPHONE ENCOUNTER
Returned call to patient and LVM. Explained no interaction with abx. Unable to see in chart what was changed while in the hospital or verify INR. Advised to continue same weekly dose of warfarin. Asked for pt to call back to r/s RTC appt for next week.

## 2021-04-01 ENCOUNTER — ANTI-COAG VISIT (OUTPATIENT)
Dept: PHARMACY | Age: 41
End: 2021-04-01
Payer: MEDICAID

## 2021-04-01 DIAGNOSIS — I26.09 PULMONARY EMBOLISM WITH ACUTE COR PULMONALE, UNSPECIFIED CHRONICITY, UNSPECIFIED PULMONARY EMBOLISM TYPE (HCC): ICD-10-CM

## 2021-04-01 DIAGNOSIS — Z79.01 LONG TERM (CURRENT) USE OF ANTICOAGULANTS: ICD-10-CM

## 2021-04-01 DIAGNOSIS — Z29.9 DVT PROPHYLAXIS: ICD-10-CM

## 2021-04-01 LAB — INTERNATIONAL NORMALIZATION RATIO, POC: 1.7

## 2021-04-01 PROCEDURE — 85610 PROTHROMBIN TIME: CPT

## 2021-04-01 PROCEDURE — 99211 OFF/OP EST MAY X REQ PHY/QHP: CPT

## 2021-04-01 NOTE — PROGRESS NOTES
Ms. Jose Miguel Oneal is a 47 y.o. y/o female with history of DVT, PE in about 2010. She presents today for anticoagulation monitoring and adjustment. Pertinent PMH: DVT assoc with PICC line  Hx of multiple sclerosis,     Patient Reported Findings:  Yes     No  [x]   []       Patient verifies current dosing regimen as listed---> uses AVS   []   [x]       S/S bleeding/bruising/swelling/SOB -denies   []   [x]       Blood in urine or stool - denies   []   [x]       Procedures scheduled in the future at this time -  Having botox procedure for bladder end of dec --> scheduled 12/30, unsure if holding at this time - asked patient to call us after she finds out.  --> none upcoming   [x]   []       Missed Dose -unsure   []   [x]       Extra Dose - denies   []   [x]       Change in medications-using perc and tylenol prn for pain with new braces- states this will continue---> takes tylenol prn. No abx or other med changes ---> updated med list as pt takes magnesium, vit d, pantoprazole, and singulair---> tylenol and midol with cramps --> some tylenol recently. Did take ceftin for UTI recently. --> no changes ---> vantin   [x]   []       Change in health/diet/appetite Normal vegetable is about twice a week and generally a low vitamin K. When she has broccoli it would be in a stir truong. May have a salad only once a month.---> had vegetables, no significant vit k ---> had greens twice since last visit. States that she had done a detox with tea (haven and turmeric tea) --> greens 2-3 times this last week, no NVD -->  had food poisoning last week, significant vomiting and diarrhea---> no changes, cramps and diarrhea with menstrual cycle this week --> no greens recently d/t lack of appetite / diarrhea --> continues with little vit k ---> no greens, no NVD  []   [x]       Change in alcohol use adamant that she did not drink alcohol while in [place] --> states now that she had one glass of alcohol in [facility].  none recently --> no changes   []   [x]       Change in activity  []   [x]       Hospital admission -3/18-23 ecoli in blood, vantin, INR 2.0 on discharge  3/18- INR 1.2, abx started   3/19- 5mg INR 1.3    3/20- held, INR 2.0  3/21- 2mg, INR 2.2  3/22-2mg  3/23- 2mg  []   [x]       Emergency department visit-  []   [x]       Other complaints   Clinical Outcomes:  Yes     No  []   [x]       Major bleeding event  []   [x]       Thromboembolic event    Duration of warfarin Therapy: Indefinitely  INR Range:  2.0-3.0     INR is 1.7 today    Unsure if missed a dose. Take 6.25 mg tonight then continue weekly dose of 3.75mg on Sun and 5mg all other days. Encouraged to maintain a consistency of vegetables/salads.    Recheck INR in 1 week, 4/9 per pt request     Referring PCP is Dr. Isabel Alcala results  INR (no units)   Date Value   04/01/2021 1.7   03/11/2021 1.5   02/22/2021 5.0   02/08/2021 1.8   10/05/2020 8.93 (HH)   09/19/2020 3.22 (H)   07/17/2020 2.16 (H)   06/11/2020 3.55 (H)     CLINICAL PHARMACY CONSULT: [facility] Tracking Only    PHSO: No  Total # of Interventions Recommended: 1  - Increased Dose #: 1  - Maintenance Safety Lab Monitoring #: 1  Total Interventions Accepted: 1  Time Spent (min): [address], PharmD

## 2021-04-12 ENCOUNTER — TELEPHONE (OUTPATIENT)
Dept: PHARMACY | Age: 41
End: 2021-04-12

## 2021-04-16 ENCOUNTER — ANTI-COAG VISIT (OUTPATIENT)
Dept: PHARMACY | Age: 41
End: 2021-04-16
Payer: MEDICAID

## 2021-04-16 DIAGNOSIS — Z79.01 LONG TERM (CURRENT) USE OF ANTICOAGULANTS: ICD-10-CM

## 2021-04-16 DIAGNOSIS — Z29.9 DVT PROPHYLAXIS: ICD-10-CM

## 2021-04-16 DIAGNOSIS — I26.09 PULMONARY EMBOLISM WITH ACUTE COR PULMONALE, UNSPECIFIED CHRONICITY, UNSPECIFIED PULMONARY EMBOLISM TYPE (HCC): ICD-10-CM

## 2021-04-16 LAB — INTERNATIONAL NORMALIZATION RATIO, POC: 1.4

## 2021-04-16 PROCEDURE — 85610 PROTHROMBIN TIME: CPT

## 2021-04-16 PROCEDURE — 99212 OFFICE O/P EST SF 10 MIN: CPT

## 2021-04-16 NOTE — PROGRESS NOTES
Ms. Galileo West is a 47 y.o. y/o female with history of DVT, PE in about 2010. She presents today for anticoagulation monitoring and adjustment. Pertinent PMH: DVT assoc with PICC line  Hx of multiple sclerosis,     Patient Reported Findings:  Yes     No  []   [x]       Patient verifies current dosing regimen as listed---> uses AVS --> states that RN lost paperwork but she feels she still got doses    []   [x]       S/S bleeding/bruising/swelling/SOB -denies   []   [x]       Blood in urine or stool - denies   []   [x]       Procedures scheduled in the future at this time -  Having botox procedure for bladder end of dec --> scheduled 12/30, unsure if holding at this time - asked patient to call us after she finds out.  --> none upcoming   []   [x]       Missed Dose -denies    []   [x]       Extra Dose - denies   []   [x]       Change in medications-using perc and tylenol prn for pain with new braces- states this will continue---> takes tylenol prn. No abx or other med changes ---> updated med list as pt takes magnesium, vit d, pantoprazole, and singulair---> tylenol and midol with cramps --> some tylenol recently. Did take ceftin for UTI recently. --> no changes ---> vantin   [x]   []       Change in health/diet/appetite Normal vegetable is about twice a week and generally a low vitamin K. When she has broccoli it would be in a stir truong. May have a salad only once a month.---> had vegetables, no significant vit k ---> had greens twice since last visit.  States that she had done a detox with tea (haven and turmeric tea) --> greens 2-3 times this last week, no NVD -->  had food poisoning last week, significant vomiting and diarrhea---> no changes, cramps and diarrhea with menstrual cycle this week --> no greens recently d/t lack of appetite / diarrhea --> continues with little vit k ---> no greens, no NVD --> had salad twice this week and broccoli twice   []   [x]       Change in alcohol use adamant that she did not drink alcohol while in [place] --> states now that she had one glass of alcohol in [facility]. none recently --> no changes   []   [x]       Change in activity  []   [x]       Hospital admission -3/18-23 ecoli in blood, vantin, INR 2.0 on discharge  3/18- INR 1.2, abx started   3/19- 5mg INR 1.3    3/20- held, INR 2.0  3/21- 2mg, INR 2.2  3/22-2mg  3/23- 2mg   []   [x]       Emergency department visit-  []   [x]       Other complaints   Clinical Outcomes:  Yes     No  []   [x]       Major bleeding event  []   [x]       Thromboembolic event    Duration of warfarin Therapy: Indefinitely  INR Range:  2.0-3.0     INR is 1.4 today pt unclear as to what changed    Take 6.25 mg tonight then take 5 mg daily   Explained to patient that she has not been therapeutic since Nov 2020. Explained risks and importance of taking meds every day, consistency with diet. Explained will be having weekly appt from now on until therapeutic consistently   Encouraged to maintain a consistency of vegetables/salads.    Recheck INR in 1 week, 4/23     Referring PCP is Dr. Simeon Thompson results  INR (no units)   Date Value   04/01/2021 1.7   03/11/2021 1.5   02/22/2021 5.0   02/08/2021 1.8   10/05/2020 8.93 (HH)   09/19/2020 3.22 (H)   07/17/2020 2.16 (H)   06/11/2020 3.55 (H)     CLINICAL PHARMACY CONSULT: MED RECONCILIATION/REVIEW ADDENDUM    For Pharmacy Admin Tracking Only    PHSO: No  Total # of Interventions Recommended: 1  - Increased Dose #: 1  - Maintenance Safety Lab Monitoring #: 1  Total Interventions Accepted: 1  Time Spent (min): 15    Shanel Ronquillo, PharmD

## 2021-04-26 ENCOUNTER — TELEPHONE (OUTPATIENT)
Dept: PHARMACY | Age: 41
End: 2021-04-26

## 2021-04-30 ENCOUNTER — ANTI-COAG VISIT (OUTPATIENT)
Dept: PHARMACY | Age: 41
End: 2021-04-30
Payer: MEDICAID

## 2021-04-30 DIAGNOSIS — I26.09 PULMONARY EMBOLISM WITH ACUTE COR PULMONALE, UNSPECIFIED CHRONICITY, UNSPECIFIED PULMONARY EMBOLISM TYPE (HCC): ICD-10-CM

## 2021-04-30 DIAGNOSIS — Z79.01 LONG TERM (CURRENT) USE OF ANTICOAGULANTS: ICD-10-CM

## 2021-04-30 DIAGNOSIS — Z29.9 DVT PROPHYLAXIS: ICD-10-CM

## 2021-04-30 LAB — INTERNATIONAL NORMALIZATION RATIO, POC: 1

## 2021-04-30 PROCEDURE — 99212 OFFICE O/P EST SF 10 MIN: CPT

## 2021-04-30 PROCEDURE — 85610 PROTHROMBIN TIME: CPT

## 2021-04-30 NOTE — PROGRESS NOTES
Ms. Louise Kinsey is a 47 y.o. y/o female with history of DVT, PE in about 2010. She presents today for anticoagulation monitoring and adjustment. Pertinent PMH: DVT assoc with PICC line  Hx of multiple sclerosis,     Patient Reported Findings:  Yes     No  []   [x]       Patient verifies current dosing regimen as listed---> uses AVS --> states that RN lost paperwork but she feels she still got doses    []   [x]       S/S bleeding/bruising/swelling/SOB -denies   []   [x]       Blood in urine or stool - denies   []   [x]       Procedures scheduled in the future at this time -  Having botox procedure for bladder end of dec --> scheduled 12/30, unsure if holding at this time - asked patient to call us after she finds out.  --> none upcoming   [x]   []       Missed Dose -last week missed undisclosed number of doses d/t thinking she ran out of warfarin     []   [x]       Extra Dose - denies   []   [x]       Change in medications-using perc and tylenol prn for pain with new braces- states this will continue---> takes tylenol prn. No abx or other med changes ---> updated med list as pt takes magnesium, vit d, pantoprazole, and singulair---> tylenol and midol with cramps --> some tylenol recently. Did take ceftin for UTI recently.  ---> vantin --> no changes  [x]   []       Change in health/diet/appetite Normal vegetable is about twice a week and generally a low vitamin K. When she has broccoli it would be in a stir truong. May have a salad only once a month.---> had vegetables, no significant vit k ---> had greens twice since last visit.  States that she had done a detox with tea (haven and turmeric tea) --> greens 2-3 times this last week, no NVD -->  had food poisoning last week, significant vomiting and diarrhea---> no changes, cramps and diarrhea with menstrual cycle this week --> no greens recently d/t lack of appetite / diarrhea --> continues with little vit k ---> no greens, no NVD --> had salad twice this week and broccoli twice --> no VD, no greens. []   [x]       Change in alcohol use adamant that she did not drink alcohol while in [place] --> states now that she had one glass of alcohol in [facility]. none recently --> no changes   []   [x]       Change in activity  []   [x]       Hospital admission -3/18-23 ecoli in blood, vantin, INR 2.0 on discharge  3/18- INR 1.2, abx started   3/19- 5mg INR 1.3    3/20- held, INR 2.0  3/21- 2mg, INR 2.2  3/22-2mg  3/23- 2mg   []   [x]       Emergency department visit-  []   [x]       Other complaints   Clinical Outcomes:  Yes     No  []   [x]       Major bleeding event  []   [x]       Thromboembolic event    Duration of warfarin therapy: Indefinitely  INR Range:  2.0-3.0     Missed multiple appts last week. INR is 1 today pt states that she has not missed any doses this week and took 5 mg daily but cannot confirm because RN fills pillbox    Take 6.25 mg tonight then increase weekly dose to 6.25 mg on Sun and Thurs and 5 mg all other days of the week (7% inc)   Explained risks and importance of taking meds every day, consistency with diet. Explained will be having weekly appt from now on until therapeutic consistently   Encouraged to maintain a consistency of vegetables/salads.    Recheck INR in 1 week, 5/6     Referring PCP is Dr. Amy Odonnell results  INR (no units)   Date Value   04/30/2021 1   04/16/2021 1.4   04/01/2021 1.7   03/11/2021 1.5   10/05/2020 8.93 (HH)   09/19/2020 3.22 (H)   07/17/2020 2.16 (H)   06/11/2020 3.55 (H)     For Pharmacy Admin Tracking Only     Intervention Detail: Dose Adjustment: 1: reason: Therapy Optimization   Total # of Interventions Recommended: 1   Total # of Interventions Accepted: 1   Time Spent (min): 15

## 2021-05-10 ENCOUNTER — TELEPHONE (OUTPATIENT)
Dept: PHARMACY | Age: 41
End: 2021-05-10

## 2021-05-12 ENCOUNTER — ANTI-COAG VISIT (OUTPATIENT)
Dept: PHARMACY | Age: 41
End: 2021-05-12
Payer: MEDICAID

## 2021-05-12 DIAGNOSIS — Z29.9 DVT PROPHYLAXIS: ICD-10-CM

## 2021-05-12 DIAGNOSIS — I26.09 OTHER ACUTE PULMONARY EMBOLISM WITH ACUTE COR PULMONALE (HCC): ICD-10-CM

## 2021-05-12 DIAGNOSIS — Z79.01 LONG TERM (CURRENT) USE OF ANTICOAGULANTS: ICD-10-CM

## 2021-05-12 LAB — INTERNATIONAL NORMALIZATION RATIO, POC: 1.8

## 2021-05-12 PROCEDURE — 85610 PROTHROMBIN TIME: CPT

## 2021-05-12 PROCEDURE — 99211 OFF/OP EST MAY X REQ PHY/QHP: CPT

## 2021-05-12 NOTE — PROGRESS NOTES
Ms. Erika Almodovar is a 47 y.o. y/o female with history of DVT, PE in about 2010. She presents today for anticoagulation monitoring and adjustment. Pertinent PMH: DVT assoc with PICC line  Hx of multiple sclerosis,     Patient Reported Findings:  Yes     No  []   [x]       Patient verifies current dosing regimen as listed---> uses AVS --> states that RN lost paperwork but she feels she still got doses    []   [x]       S/S bleeding/bruising/swelling/SOB -denies   []   [x]       Blood in urine or stool - denies   []   [x]       Procedures scheduled in the future at this time -  Having botox procedure for bladder end of dec --> scheduled 12/30, unsure if holding at this time - asked patient to call us after she finds out.  --> none upcoming   []   [x]       Missed Dose   []   [x]       Extra Dose - denies   []   [x]       Change in medications-using perc and tylenol prn for pain with new braces- states this will continue---> takes tylenol prn. No abx or other med changes ---> updated med list as pt takes magnesium, vit d, pantoprazole, and singulair---> tylenol and midol with cramps --> some tylenol recently. Did take ceftin for UTI recently.  ---> vantin --> no changes  []   [x]       Change in health/diet/appetite Normal vegetable is about twice a week and generally a low vitamin K. When she has broccoli it would be in a stir truong. May have a salad only once a month.---> had vegetables, no significant vit k ---> had greens twice since last visit. States that she had done a detox with tea (haven and turmeric tea) --> greens 2-3 times this last week, no NVD -->  had food poisoning last week, significant vomiting and diarrhea---> no changes, cramps and diarrhea with menstrual cycle this week --> no greens recently d/t lack of appetite / diarrhea --> continues with little vit k ---> no greens, no NVD --> had salad twice this week and broccoli twice --> no VD, no greens.      []   [x]       Change in alcohol use adamant that she did not drink alcohol while in [place] --> states now that she had one glass of alcohol in [facility]. none recently --> no changes   []   [x]       Change in activity  []   [x]       Hospital admission -3/18-23 ecoli in blood, vantin, INR 2.0 on discharge  3/18- INR 1.2, abx started   3/19- 5mg INR 1.3    3/20- held, INR 2.0  3/21- 2mg, INR 2.2  3/22-2mg  3/23- 2mg   []   [x]       Emergency department visit-  []   [x]       Other complaints   Clinical Outcomes:  Yes     No  []   [x]       Major bleeding event  []   [x]       Thromboembolic event    Duration of warfarin therapy: Indefinitely  INR Range:  2.0-3.0     Missed multiple appts last week. INR is 1.8 today, improved from last week  Take 6.25 mg tonight then increase weekly dose to 6.25 mg on Sun, Tues, and Thurs and 5 mg all other days of the week (3.3% inc)   Explained risks and importance of taking meds every day, consistency with diet. Explained will be having weekly appt from now on until therapeutic consistently   Encouraged to maintain a consistency of vegetables/salads.    Recheck INR in 1 week, 5/19    Referring PCP is Dr. Obi Zaidi results  INR (no units)   Date Value   05/12/2021 1.8   04/30/2021 1   04/16/2021 1.4   04/01/2021 1.7   10/05/2020 8.93 (HH)   09/19/2020 3.22 (H)   07/17/2020 2.16 (H)   06/11/2020 3.55 (H)     For Pharmacy Admin Tracking Only     Intervention Detail: Dose Adjustment: 1: reason: Therapy Optimization   Total # of Interventions Recommended: 1   Total # of Interventions Accepted: 1   Time Spent (min): 15

## 2021-05-19 ENCOUNTER — ANTI-COAG VISIT (OUTPATIENT)
Dept: PHARMACY | Age: 41
End: 2021-05-19
Payer: MEDICAID

## 2021-05-19 DIAGNOSIS — I26.99 PULMONARY EMBOLISM, UNSPECIFIED CHRONICITY, UNSPECIFIED PULMONARY EMBOLISM TYPE, UNSPECIFIED WHETHER ACUTE COR PULMONALE PRESENT (HCC): ICD-10-CM

## 2021-05-19 DIAGNOSIS — Z79.01 LONG TERM (CURRENT) USE OF ANTICOAGULANTS: ICD-10-CM

## 2021-05-19 DIAGNOSIS — Z29.9 DVT PROPHYLAXIS: Primary | ICD-10-CM

## 2021-05-19 LAB — INTERNATIONAL NORMALIZATION RATIO, POC: 1.9

## 2021-05-19 PROCEDURE — 85610 PROTHROMBIN TIME: CPT

## 2021-05-19 PROCEDURE — 99211 OFF/OP EST MAY X REQ PHY/QHP: CPT

## 2021-05-19 NOTE — PROGRESS NOTES
Ms. Silvia Resendez is a 47 y.o. y/o female with history of DVT, PE in about 2010. She presents today for anticoagulation monitoring and adjustment. Pertinent PMH: DVT assoc with PICC line  Hx of multiple sclerosis,     Patient Reported Findings:  Yes     No  [x]   []       Patient verifies current dosing regimen as listed---> uses AVS --> confirms dose  []   [x]       S/S bleeding/bruising/swelling/SOB -denies   []   [x]       Blood in urine or stool - denies   []   [x]       Procedures scheduled in the future at this time  ---> none upcoming   []   [x]       Missed Dose   []   [x]       Extra Dose - denies   []   [x]       Change in medications-using perc and tylenol prn for pain with new braces- states this will continue---> takes tylenol prn. No abx or other med changes ---> updated med list as pt takes magnesium, vit d, pantoprazole, and singulair---> tylenol and midol with cramps --> some tylenol recently. Did take ceftin for UTI recently.  ---> vantin --> no changes ---> None  []   [x]       Change in health/diet/appetite Normal vegetable is about twice a week and generally a low vitamin K. When she has broccoli it would be in a stir truong. May have a salad only once a month.---> had vegetables, no significant vit k ---> had greens twice since last visit. States that she had done a detox with tea (haven and turmeric tea) --> greens 2-3 times this last week, no NVD -->  had food poisoning last week, significant vomiting and diarrhea---> no changes, cramps and diarrhea with menstrual cycle this week --> no greens recently d/t lack of appetite / diarrhea --> continues with little vit k ---> no greens, no NVD --> had salad twice this week and broccoli twice --> no VD, no greens. ----> Once weekly, no NVD  []   [x]       Change in alcohol use adamant that she did not drink alcohol while in [place] --> states now that she had one glass of alcohol in [place].  none recently --> denies  []   [x]       Change in activity  []   [x]       Hospital admission   []   [x]       Emergency department visit-  []   [x]       Other complaints   Clinical Outcomes:  Yes     No  []   [x]       Major bleeding event  []   [x]       Thromboembolic event    Duration of warfarin therapy: Indefinitely  INR Range:  2.0-3.0     Missed multiple appts last week. INR is 1.9 today, improved from last week  Continue weekly dose of 6.25 mg on Sun, Tues, and Thurs and 5 mg all other days of the week     Explained risks and importance of taking meds every day, consistency with diet. Explained will be having weekly appt from now on until therapeutic consistently   Encouraged to maintain a consistency of vegetables/salads.    Recheck INR in 2 weeks, 6/3/21    Referring PCP is Dr. Dleores Hung results  INR (no units)   Date Value   05/12/2021 1.8   04/30/2021 1   04/16/2021 1.4   04/01/2021 1.7   10/05/2020 8.93 (HH)   09/19/2020 3.22 (H)   07/17/2020 2.16 (H)   06/11/2020 3.55 (H)     For Pharmacy Admin Tracking Only     Total # of Interventions Recommended: 0   Total # of Interventions Accepted: 0   Time Spent (min): 9813 Rigo Herrera, PharmD  PGY-1 Pharmacy Resident  [id number]

## 2021-06-07 ENCOUNTER — TELEPHONE (OUTPATIENT)
Dept: PHARMACY | Age: 41
End: 2021-06-07

## 2021-06-08 ENCOUNTER — ANTI-COAG VISIT (OUTPATIENT)
Dept: PHARMACY | Age: 41
End: 2021-06-08
Payer: MEDICAID

## 2021-06-08 DIAGNOSIS — Z29.9 DVT PROPHYLAXIS: Primary | ICD-10-CM

## 2021-06-08 DIAGNOSIS — I26.09 PULMONARY EMBOLISM WITH ACUTE COR PULMONALE, UNSPECIFIED CHRONICITY, UNSPECIFIED PULMONARY EMBOLISM TYPE (HCC): ICD-10-CM

## 2021-06-08 DIAGNOSIS — Z79.01 LONG TERM (CURRENT) USE OF ANTICOAGULANTS: ICD-10-CM

## 2021-06-08 LAB — INTERNATIONAL NORMALIZATION RATIO, POC: 2

## 2021-06-08 PROCEDURE — 99211 OFF/OP EST MAY X REQ PHY/QHP: CPT

## 2021-06-08 PROCEDURE — 85610 PROTHROMBIN TIME: CPT

## 2021-06-22 ENCOUNTER — TELEPHONE (OUTPATIENT)
Dept: PHARMACY | Age: 41
End: 2021-06-22

## 2021-06-22 NOTE — TELEPHONE ENCOUNTER
Pt needs her warfarin RF called into [facility] on [address] [facility]. States she just got back from [place] forgot to take them there , missed 3 days, had an emergency dose called in for Sat and Sun , ran out again had none yesterday or today ( verify again w/ pt). [phone] ---> called patient, states ran out of warfarin yesterday. States only missed yesterday's dose. Will have patient take 7.5mg tonight as a boost then continue normal dose.      Warfarin prescription phoned into [place] [address] #[id number] - [id number], [address] [facility] [place] [phone] under Dr. T[facility], [phone]  Warfarin 2.5 mg tabs  Take 6.25 mg (2.5 mg x 2.5) every Sun, Tue, Thu; 5 mg (2.5 mg x 2) all other days  90 days   2 refills  Robert Newberry, LukaszD, [facility]

## 2021-06-23 ENCOUNTER — TELEPHONE (OUTPATIENT)
Dept: PHARMACY | Age: 41
End: 2021-06-23

## 2021-06-23 NOTE — TELEPHONE ENCOUNTER
Called [facility] on [facility]- pharmacist there incorrectly calculated dose, corrected the issue and they are filling it for her now. Pt will be able to go to either [facility] to - they can transfer. Left pt a message letting her know it was taken care of.     Ruthy Crawley, LukaszD, [facility]

## 2021-06-23 NOTE — TELEPHONE ENCOUNTER
Pt called stating [facility] on [address] did not fill her warfarin prescription. Pharmacist confirmed she called it in on 6/22.  Pt would like pharmacist to call in RF to [facility] in [place] [id number]

## 2021-06-24 NOTE — TELEPHONE ENCOUNTER
Markym letting pt know her prescription was ready at [facility] on [address] [place] [address] . Asked pt to call [facility] , she needs to r/s [facility] appt for tomorrow d/t not having any warfarin in her system.

## 2021-06-24 NOTE — TELEPHONE ENCOUNTER
Patient called, got warfarin. Take 7.5mg (3 tablets) tonight then continue normal dose. RS for Tuesday 6/29, she will check with aid and call back if needs to RS.     Jimmy Jackman, PharmD, [facility]

## 2021-07-01 ENCOUNTER — ANTI-COAG VISIT (OUTPATIENT)
Dept: PHARMACY | Age: 41
End: 2021-07-01
Payer: MEDICAID

## 2021-07-01 DIAGNOSIS — I26.09 PULMONARY EMBOLISM WITH ACUTE COR PULMONALE, UNSPECIFIED CHRONICITY, UNSPECIFIED PULMONARY EMBOLISM TYPE (HCC): ICD-10-CM

## 2021-07-01 DIAGNOSIS — Z79.01 LONG TERM (CURRENT) USE OF ANTICOAGULANTS: ICD-10-CM

## 2021-07-01 DIAGNOSIS — Z29.9 DVT PROPHYLAXIS: Primary | ICD-10-CM

## 2021-07-01 LAB — INTERNATIONAL NORMALIZATION RATIO, POC: 1.5

## 2021-07-01 PROCEDURE — 85610 PROTHROMBIN TIME: CPT

## 2021-07-01 PROCEDURE — 99212 OFFICE O/P EST SF 10 MIN: CPT

## 2021-07-01 NOTE — PROGRESS NOTES
Ms. Teodoro Wang is a 47 y.o. y/o female with history of DVT, PE in about 2010. She presents today for anticoagulation monitoring and adjustment. Pertinent PMH: DVT assoc with PICC line  Hx of multiple sclerosis,     Patient Reported Findings:  Yes     No  [x]   []       Patient verifies current dosing regimen as listed---> uses AVS --> confirms dose  []   [x]       S/S bleeding/bruising/swelling/SOB -denies   []   [x]       Blood in urine or stool - denies   []   [x]       Procedures scheduled in the future at this time  ---> none upcoming   []   [x]       Missed Dose -has missed >5 days in past 10 days, not sure what days/when  []   [x]       Extra Dose - denies   []   [x]       Change in medications-using perc and tylenol prn for pain with new braces- states this will continue---> takes tylenol prn. No abx or other med changes ---> updated med list as pt takes magnesium, vit d, pantoprazole, and singulair---> tylenol and midol with cramps --> some tylenol recently. Did take ceftin for UTI recently.  ---> vantin --> no changes ---> None---> going to take fluconazole x1 tonight ---> denies   []   [x]       Change in health/diet/appetite Normal vegetable is about twice a week and generally a low vitamin K. When she has broccoli it would be in a stir truong. May have a salad only once a month.---> had vegetables, no significant vit k ---> had greens twice since last visit.  States that she had done a detox with tea (haven and turmeric tea) --> greens 2-3 times this last week, no NVD -->  had food poisoning last week, significant vomiting and diarrhea---> no changes, cramps and diarrhea with menstrual cycle this week --> no greens recently d/t lack of appetite / diarrhea --> continues with little vit k ---> no greens, no NVD --> had salad twice this week and broccoli twice --> no VD, no greens. ----> Once weekly, no NVD  []   [x]       Change in alcohol use adamant that she did not drink alcohol while in [place] --> states now that she had one glass of alcohol in [place]. none recently --> denies  []   [x]       Change in activity  []   [x]       Hospital admission   []   [x]       Emergency department visit-  []   [x]       Other complaints   Clinical Outcomes:  Yes     No  []   [x]       Major bleeding event  []   [x]       Thromboembolic event    Duration of warfarin therapy: Indefinitely  INR Range:  2.0-3.0     Missed multiple appts. Was due back . Ran out of warfarin (lost it), pharmacy could not do math and refused to fill [facility] issues. Took patient until at least  to get med filled dt transportation issues on top of that. Explained next time getting close to running out/if she loses it again she will need to call us and let us know and get it from [facility] to prevent these issues in the future. INR is 1.5 today dt missed doses. Unclear how many in the last week/10 days. Take 7.5mg tonight then continue weekly dose to 6.25 mg on Sun, Tu, and Th and 5 mg all other days of the week. Explained risks and importance of taking meds every day, consistency with diet. Explained will be having weekly appt from now on until therapeutic consistently. Encouraged to maintain a consistency of vegetables/salads.    Recheck INR in 10 days,  per pt request with transportation and aid      Referring PCP is Dr. Jose A De La Cruz results  INR (no units)   Date Value   2021 1.5   2021 2.0   2021 1.9   2021 1.8   10/05/2020 8.93 (HH)   2020 3.22 (H)   2020 2.16 (H)   2020 3.55 (H)   For Pharmacy Admin Tracking Only     Intervention Detail: Adherence Monitorin and Dose Adjustment: 1, reason: Therapy Optimization   Total # of Interventions Recommended: 2   Total # of Interventions Accepted: 2   Time Spent (min): 15

## 2021-07-16 ENCOUNTER — ANTI-COAG VISIT (OUTPATIENT)
Dept: PHARMACY | Age: 41
End: 2021-07-16
Payer: MEDICAID

## 2021-07-16 DIAGNOSIS — Z79.01 LONG TERM (CURRENT) USE OF ANTICOAGULANTS: ICD-10-CM

## 2021-07-16 DIAGNOSIS — Z29.9 DVT PROPHYLAXIS: Primary | ICD-10-CM

## 2021-07-16 DIAGNOSIS — I26.09 PULMONARY EMBOLISM WITH ACUTE COR PULMONALE, UNSPECIFIED CHRONICITY, UNSPECIFIED PULMONARY EMBOLISM TYPE (HCC): ICD-10-CM

## 2021-07-16 LAB — INTERNATIONAL NORMALIZATION RATIO, POC: 2

## 2021-07-16 PROCEDURE — 85610 PROTHROMBIN TIME: CPT

## 2021-07-16 PROCEDURE — 99211 OFF/OP EST MAY X REQ PHY/QHP: CPT

## 2021-07-16 NOTE — PROGRESS NOTES
in [facility]. none recently --> denies  []   [x]       Change in activity  []   [x]       Hospital admission   []   [x]       Emergency department visit-  []   [x]       Other complaints   Clinical Outcomes:  Yes     No  []   [x]       Major bleeding event  []   [x]       Thromboembolic event    Duration of warfarin therapy: Indefinitely  INR Range:  2.0-3.0     Continues to miss apts. INR is 2.0 today. Continue weekly dose to 6.25 mg on Sun, Tues, and Thurs and 5 mg all other days of the week. Explained risks and importance of taking meds every day, consistency with diet. Explained will be having weekly appt from now on until therapeutic consistently. Encouraged to maintain a consistency of vegetables/salads.    Recheck INR in 2 weeks, 7/30 <--- pt states she will call when she gets home, not feeling well and declined to schedule     Referring PCP is Dr. Desiree Uriarte results  INR (no units)   Date Value   07/16/2021 2.0   07/01/2021 1.5   06/08/2021 2.0   05/19/2021 1.9   10/05/2020 8.93 (HH)   09/19/2020 3.22 (H)   07/17/2020 2.16 (H)   06/11/2020 3.55 (H)   For Pharmacy Admin Tracking Only     Total # of Interventions Recommended: 0   Total # of Interventions Accepted: 0   Time Spent (min): 15

## 2021-07-22 ENCOUNTER — TELEPHONE (OUTPATIENT)
Dept: PHARMACY | Age: 41
End: 2021-07-22

## 2021-07-22 NOTE — TELEPHONE ENCOUNTER
Returned call to patient. Advised for her to take 5 mg on Sun and Tues (6% dec) then return to normal weekly dose.        For Pharmacy Admin Tracking Only     Intervention Detail: Dose Adjustment: 1, reason: Interaction   Total # of Interventions Recommended: 1   Total # of Interventions Accepted: 1   Time Spent (min): 5

## 2021-07-22 NOTE — TELEPHONE ENCOUNTER
----- Message from [facility] sent at 7/22/2021  3:13 PM EDT -----  Regarding: new antibiotic  Contact: Patient  Patient starting new antibiotic today. Cipro 500mg x2 daily x 7 days. Please call her back @ [phone].

## 2021-07-30 ENCOUNTER — TELEPHONE (OUTPATIENT)
Dept: PHARMACY | Age: 41
End: 2021-07-30

## 2021-08-04 ENCOUNTER — ANTI-COAG VISIT (OUTPATIENT)
Dept: PHARMACY | Age: 41
End: 2021-08-04
Payer: MEDICAID

## 2021-08-04 DIAGNOSIS — I26.99 PULMONARY EMBOLISM, UNSPECIFIED CHRONICITY, UNSPECIFIED PULMONARY EMBOLISM TYPE, UNSPECIFIED WHETHER ACUTE COR PULMONALE PRESENT (HCC): ICD-10-CM

## 2021-08-04 DIAGNOSIS — Z79.01 LONG TERM (CURRENT) USE OF ANTICOAGULANTS: ICD-10-CM

## 2021-08-04 DIAGNOSIS — Z29.9 DVT PROPHYLAXIS: Primary | ICD-10-CM

## 2021-08-04 LAB — INTERNATIONAL NORMALIZATION RATIO, POC: 1.4

## 2021-08-04 PROCEDURE — 85610 PROTHROMBIN TIME: CPT

## 2021-08-04 PROCEDURE — 99211 OFF/OP EST MAY X REQ PHY/QHP: CPT

## 2021-08-04 NOTE — PROGRESS NOTES
Ms. Ira mSith is a 47 y.o. y/o female with history of DVT, PE in about 2010. She presents today for anticoagulation monitoring and adjustment. Pertinent PMH: DVT assoc with PICC line  Hx of multiple sclerosis,     Patient Reported Findings:  Yes     No  [x]   []       Patient verifies current dosing regimen as listed---> uses AVS --> confirms dose  []   [x]       S/S bleeding/bruising/swelling/SOB -denies   []   [x]       Blood in urine or stool - denies   []   [x]       Procedures scheduled in the future at this time  ---> none upcoming   []   [x]       Missed Dose -missed on Sunday or Monday not sure which one  []   [x]       Extra Dose - denies   []   [x]       Change in medications-using perc and tylenol prn for pain with new braces- states this will continue---> takes tylenol prn. No abx or other med changes ---> updated med list as pt takes magnesium, vit d, pantoprazole, and singulair---> tylenol and midol with cramps --> some tylenol recently. Did take ceftin for UTI recently.  ---> vantin --> going to take fluconazole x1 tonight --->cipro on in last week but now off, may have a new med prescribed today possibly abx but pt unsure   []   [x]       Change in health/diet/appetite Normal vegetable is about twice a week and generally a low vitamin K. When she has broccoli it would be in a stir truong. May have a salad only once a month.---> had vegetables, no significant vit k ---> had greens twice since last visit.  States that she had done a detox with tea (haven and turmeric tea) --> greens 2-3 times this last week, no NVD -->  had food poisoning last week, significant vomiting and diarrhea---> no changes, cramps and diarrhea with menstrual cycle this week --> no greens recently d/t lack of appetite / diarrhea --> continues with little vit k ---> no greens, no NVD --> had salad twice this week and broccoli twice --> no VD, no greens. ----> Once weekly, no NVD  []   [x]       Change in alcohol use

## 2021-08-05 ENCOUNTER — TELEPHONE (OUTPATIENT)
Dept: PHARMACY | Age: 41
End: 2021-08-05

## 2021-08-05 NOTE — TELEPHONE ENCOUNTER
----- Message from Ric Mike sent at 8/5/2021  4:21 PM EDT -----  Pt called stating her doctors office called her and it looks like they'll be prescribing her Ciprofloxacin but that have to wait for a signature. She'll know for sure tomorrow.  [phone]

## 2021-08-05 NOTE — TELEPHONE ENCOUNTER
Returned call to patient. Explained since INR so low yesterday, no adjustments to dose, will help raise INR.  Pt verified understanding and RTC in 1 week, 8/11

## 2021-08-11 ENCOUNTER — TELEPHONE (OUTPATIENT)
Dept: PHARMACY | Age: 41
End: 2021-08-11

## 2021-08-11 ENCOUNTER — APPOINTMENT (OUTPATIENT)
Dept: PHARMACY | Age: 41
End: 2021-08-11
Payer: MEDICAID

## 2021-08-17 NOTE — TELEPHONE ENCOUNTER
Called patient to reschedule and she did for 8/19. Patient states her doctor is calling her back about an antibiotic and if they can't find an oral antibiotic, she will have to be admitted for a few days to receive something IV. Patient's doctor is to call her back and I asked patient to call us if she gets the antibiotic and let us know which one it is.

## 2021-08-20 ENCOUNTER — ANTI-COAG VISIT (OUTPATIENT)
Dept: PHARMACY | Age: 41
End: 2021-08-20
Payer: MEDICAID

## 2021-08-20 DIAGNOSIS — I26.99 PULMONARY EMBOLISM, OTHER, UNSPECIFIED CHRONICITY, UNSPECIFIED WHETHER ACUTE COR PULMONALE PRESENT (HCC): ICD-10-CM

## 2021-08-20 DIAGNOSIS — Z29.9 DVT PROPHYLAXIS: Primary | ICD-10-CM

## 2021-08-20 DIAGNOSIS — Z79.01 LONG TERM (CURRENT) USE OF ANTICOAGULANTS: ICD-10-CM

## 2021-08-20 LAB — INTERNATIONAL NORMALIZATION RATIO, POC: 2.4

## 2021-08-20 PROCEDURE — 85610 PROTHROMBIN TIME: CPT

## 2021-08-20 PROCEDURE — 99211 OFF/OP EST MAY X REQ PHY/QHP: CPT

## 2021-08-20 NOTE — PROGRESS NOTES
Ms. Mildred Mena is a 47 y.o. y/o female with history of DVT, PE in about 2010. She presents today for anticoagulation monitoring and adjustment. Pertinent PMH: DVT assoc with PICC line  Hx of multiple sclerosis,     Patient Reported Findings:  Yes     No  [x]   []       Patient verifies current dosing regimen as listed---> uses AVS --> confirms dose  []   [x]       S/S bleeding/bruising/swelling/SOB -denies   []   [x]       Blood in urine or stool - denies   []   [x]       Procedures scheduled in the future at this time  ---> none upcoming   []   [x]       Missed Dose -missed on Sunday or Monday not sure which one  []   [x]       Extra Dose - denies   []   [x]       Change in medications-using perc and tylenol prn for pain with new braces- states this will continue---> takes tylenol prn. No abx or other med changes ---> updated med list as pt takes magnesium, vit d, pantoprazole, and singulair---> tylenol and midol with cramps --> some tylenol recently. Did take ceftin for UTI recently.  ---> vantin --> going to take fluconazole x1 tonight --->cipro on in last week but now off, may have a new med prescribed today possibly abx but pt unsure --> will have one day of ceftriaxone followed by two days omnicef.  []   [x]       Change in health/diet/appetite Normal vegetable is about twice a week and generally a low vitamin K. When she has broccoli it would be in a stir truong. May have a salad only once a month.---> had vegetables, no significant vit k ---> had greens twice since last visit. States that she had done a detox with tea (haven and turmeric tea) --> greens 2-3 times this last week, no NVD -->  had food poisoning last week, significant vomiting and diarrhea---> no changes, cramps and diarrhea with menstrual cycle this week --> no greens recently d/t lack of appetite / diarrhea --> continues with little vit k ---> no greens, no NVD --> had salad twice this week and broccoli twice --> no VD, no greens.

## 2021-08-30 ENCOUNTER — TELEPHONE (OUTPATIENT)
Dept: PHARMACY | Age: 41
End: 2021-08-30

## 2021-09-02 ENCOUNTER — ANTI-COAG VISIT (OUTPATIENT)
Dept: PHARMACY | Age: 41
End: 2021-09-02
Payer: MEDICAID

## 2021-09-02 DIAGNOSIS — Z79.01 LONG TERM (CURRENT) USE OF ANTICOAGULANTS: ICD-10-CM

## 2021-09-02 DIAGNOSIS — I26.09 PULMONARY EMBOLISM WITH ACUTE COR PULMONALE, UNSPECIFIED CHRONICITY, UNSPECIFIED PULMONARY EMBOLISM TYPE (HCC): ICD-10-CM

## 2021-09-02 DIAGNOSIS — Z29.9 DVT PROPHYLAXIS: Primary | ICD-10-CM

## 2021-09-02 LAB — INTERNATIONAL NORMALIZATION RATIO, POC: 1.5

## 2021-09-02 PROCEDURE — 99211 OFF/OP EST MAY X REQ PHY/QHP: CPT

## 2021-09-02 PROCEDURE — 85610 PROTHROMBIN TIME: CPT

## 2021-09-02 NOTE — PROGRESS NOTES
Ms. Karsten Cedeno is a 47 y.o. y/o female with history of DVT, PE in about 2010. She presents today for anticoagulation monitoring and adjustment. Pertinent PMH: DVT assoc with PICC line  Hx of multiple sclerosis,     Patient Reported Findings:  Yes     No  [x]   []       Patient verifies current dosing regimen as listed---> uses AVS --> confirms dose  []   [x]       S/S bleeding/bruising/swelling/SOB -denies   []   [x]       Blood in urine or stool - denies   []   [x]       Procedures scheduled in the future at this time  ---> none upcoming   []   [x]       Missed Dose -missed last night and another day in past week  []   [x]       Extra Dose - denies   []   [x]       Change in medications-using perc and tylenol prn for pain with new braces- states this will continue---> takes tylenol prn. No abx or other med changes ---> updated med list as pt takes magnesium, vit d, pantoprazole, and singulair---> tylenol and midol with cramps --> some tylenol recently. Did take ceftin for UTI recently.  ---> vantin --> going to take fluconazole x1 tonight --->cipro on in last week but now off, may have a new med prescribed today possibly abx but pt unsure --> will have one day of ceftriaxone followed by two days omnicef.---> baclofen, waiting on PA for abx- cefpodoxime   []   [x]       Change in health/diet/appetite Normal vegetable is about twice a week and generally a low vitamin K. When she has broccoli it would be in a stir truong. May have a salad only once a month.---> had vegetables, no significant vit k ---> had greens twice since last visit.  States that she had done a detox with tea (haven and turmeric tea) --> greens 2-3 times this last week, no NVD -->  had food poisoning last week, significant vomiting and diarrhea---> no changes, cramps and diarrhea with menstrual cycle this week --> no greens recently d/t lack of appetite / diarrhea --> continues with little vit k ---> no greens, no NVD --> had salad twice this week and broccoli twice --> no VD, no greens. ----> Once weekly, no NVD --> no changes, no NVD  []   [x]       Change in alcohol use adamant that she did not drink alcohol while in [place] --> states now that she had one glass of alcohol in [facility]. none recently --> denies  []   [x]       Change in activity  []   [x]       Hospital admission   []   [x]       Emergency department visit-  []   [x]       Other complaints   Clinical Outcomes:  Yes     No  []   [x]       Major bleeding event  []   [x]       Thromboembolic event    Duration of warfarin therapy: Indefinitely  INR Range:  2.0-3.0     Continues to miss apts. INR is 1.5 today dt missed dose last night and another one last week, not sure what day. Starting abx when PA goes through but states has not and doesn't expect it to this week- cefpodoxime. Take 7.5mg tonight then continue weekly dose to 6.25 mg on Sun, Tues, and Thurs and 5 mg all other days of the week. Explained risks and importance of taking meds every day, consistency with diet. Explained will be having weekly appt from now on until therapeutic consistently. Encouraged to maintain a consistency of vegetables/salads.    Recheck INR in 1 week,     Referring PCP is Dr. [place] results  INR (no units)   Date Value   2021 1.5   2021 2.4   2021 1.4   2021 2.0   10/05/2020 8.93 (HH)   2020 3.22 (H)   2020 2.16 (H)   2020 3.55 (H)   For Pharmacy Admin Tracking Only     Intervention Detail: Adherence Monitorin and Dose Adjustment: 1, reason: Therapy Optimization   Total # of Interventions Recommended: 2   Total # of Interventions Accepted: 2   Time Spent (min): 15

## 2021-09-09 ENCOUNTER — APPOINTMENT (OUTPATIENT)
Dept: PHARMACY | Age: 41
End: 2021-09-09
Payer: MEDICAID

## 2021-09-09 NOTE — TELEPHONE ENCOUNTER
Pt called to cancel, declined to RS, states she will call tomorrow.      Particyoko Hernandezite, PharmD, RP

## 2021-09-13 ENCOUNTER — TELEPHONE (OUTPATIENT)
Dept: PHARMACY | Age: 41
End: 2021-09-13

## 2021-09-13 NOTE — TELEPHONE ENCOUNTER
Called and spoke with patient who states she will call back today once her aid arrives to rescheduled for tomorrow.

## 2021-09-14 ENCOUNTER — APPOINTMENT (OUTPATIENT)
Dept: PHARMACY | Age: 41
End: 2021-09-14
Payer: MEDICAID

## 2021-09-16 NOTE — TELEPHONE ENCOUNTER
1st no show letter sent w/ cc to:  Hilton Abrams MD  [address] [facility], [address] [place] [address]

## 2021-09-28 ENCOUNTER — TELEPHONE (OUTPATIENT)
Dept: PHARMACY | Age: 41
End: 2021-09-28

## 2021-09-28 NOTE — TELEPHONE ENCOUNTER
Called patient, explained hard to determine dose adjustment as patient has not been in the clinic or therapeutic recently. Scheduled patient for next Tuesday, 10/5 as that is earliest apt patient could make.     Azar Byrnes, LukaszD, [facility]

## 2021-09-28 NOTE — TELEPHONE ENCOUNTER
----- Message from Roselyn Sharp sent at 9/28/2021  4:36 PM EDT -----  Regarding: new antibiotic  Contact: patient  Patient taking Cefpodoxime 50mg twice a day x 3days. Please call her back @ [phone].

## 2021-10-05 ENCOUNTER — ANTI-COAG VISIT (OUTPATIENT)
Dept: PHARMACY | Age: 41
End: 2021-10-05
Payer: MEDICAID

## 2021-10-05 DIAGNOSIS — I26.09 PULMONARY EMBOLISM WITH ACUTE COR PULMONALE, UNSPECIFIED CHRONICITY, UNSPECIFIED PULMONARY EMBOLISM TYPE (HCC): ICD-10-CM

## 2021-10-05 DIAGNOSIS — Z79.01 LONG TERM (CURRENT) USE OF ANTICOAGULANTS: ICD-10-CM

## 2021-10-05 DIAGNOSIS — Z29.9 DVT PROPHYLAXIS: Primary | ICD-10-CM

## 2021-10-05 LAB — INTERNATIONAL NORMALIZATION RATIO, POC: 1.5

## 2021-10-05 PROCEDURE — 85610 PROTHROMBIN TIME: CPT

## 2021-10-05 PROCEDURE — 99212 OFFICE O/P EST SF 10 MIN: CPT

## 2021-10-05 NOTE — PROGRESS NOTES
Ms. Lucrecia Monsalve is a 46 y.o. y/o female with history of DVT, PE in about 2010. She presents today for anticoagulation monitoring and adjustment. Pertinent PMH: DVT assoc with PICC line  Hx of multiple sclerosis,     Patient Reported Findings:  Yes     No  [x]   []       Patient verifies current dosing regimen as listed---> uses AVS --> confirms dose  []   [x]       S/S bleeding/bruising/swelling/SOB -denies   []   [x]       Blood in urine or stool - denies   []   [x]       Procedures scheduled in the future at this time  ---> none upcoming   []   [x]       Missed Dose - Sunday  []   [x]       Extra Dose - denies   []   [x]       Change in medications-using perc and tylenol prn for pain with new braces- states this will continue---> takes tylenol prn. No abx or other med changes ---> updated med list as pt takes magnesium, vit d, pantoprazole, and singulair---> tylenol and midol with cramps --> some tylenol recently. Did take ceftin for UTI recently.  ---> vantin --> going to take fluconazole x1 tonight --->cipro on in last week but now off, may have a new med prescribed today possibly abx but pt unsure --> will have one day of ceftriaxone followed by two days omnicef.---> baclofen, waiting on PA for abx- cefpodoxime---> done with abx, no changes   []   [x]       Change in health/diet/appetite Normal vegetable is about twice a week and generally a low vitamin K. When she has broccoli it would be in a stir truong. May have a salad only once a month.---> had vegetables, no significant vit k ---> had greens twice since last visit.  States that she had done a detox with tea (haven and turmeric tea) --> greens 2-3 times this last week, no NVD -->  had food poisoning last week, significant vomiting and diarrhea---> no changes, cramps and diarrhea with menstrual cycle this week --> no greens recently d/t lack of appetite / diarrhea --> continues with little vit k ---> no greens, no NVD --> had salad twice this week and broccoli twice --> no VD, no greens. ----> Once weekly, no NVD --> no changes, no NVD---> had greens 2-3 times in the past week, no NVD  []   [x]       Change in alcohol use adamant that she did not drink alcohol while in [place] --> states now that she had one glass of alcohol in [facility]. none recently --> denies  []   [x]       Change in activity  []   [x]       Hospital admission   []   [x]       Emergency department visit-  []   [x]       Other complaints   Clinical Outcomes:  Yes     No  []   [x]       Major bleeding event  []   [x]       Thromboembolic event    Duration of warfarin therapy: Indefinitely  INR Range:  2.0-3.0     Continues to miss apts and miss doses. INR is 1.5 today dt missed dose  and more greens than normal. Explained need for consistency with diet, and for patient to take her doses. Patient consistently misses doses/apts- unclear as to what is contributing to that (her schedule vs aids vs transportation). Explained will need to do weekly apts and monitor closely since non-compliant. Take 7.5mg tonight then continue weekly dose to 6.25 mg on Sun, Tues, and Thurs and 5 mg all other days of the week. Explained risks and importance of taking meds every day, consistency with diet. Explained will be having weekly appt from now on until therapeutic consistently. Encouraged to maintain a consistency of vegetables/salads.    Recheck INR in 1 week, 10/12    Referring PCP is Dr. Antonio Ramos results  INR (no units)   Date Value   2021 1.5   2021 2.4   2021 1.4   2021 2.0   10/05/2020 8.93 (HH)   2020 3.22 (H)   2020 2.16 (H)   2020 3.55 (H)   For Pharmacy Admin Tracking Only     Intervention Detail: Adherence Monitorin and Dose Adjustment: 1, reason: Therapy Optimization   Total # of Interventions Recommended: 2   Total # of Interventions Accepted: 2   Time Spent (min): 15

## 2021-10-12 ENCOUNTER — TELEPHONE (OUTPATIENT)
Dept: PHARMACY | Age: 41
End: 2021-10-12

## 2021-10-12 ENCOUNTER — APPOINTMENT (OUTPATIENT)
Dept: PHARMACY | Age: 41
End: 2021-10-12
Payer: MEDICAID

## 2021-10-15 ENCOUNTER — APPOINTMENT (OUTPATIENT)
Dept: PHARMACY | Age: 41
End: 2021-10-15
Payer: MEDICAID

## 2021-10-15 ENCOUNTER — ANTI-COAG VISIT (OUTPATIENT)
Dept: PHARMACY | Age: 41
End: 2021-10-15
Payer: MEDICAID

## 2021-10-15 DIAGNOSIS — Z79.01 LONG TERM (CURRENT) USE OF ANTICOAGULANTS: ICD-10-CM

## 2021-10-15 DIAGNOSIS — I26.99 PULMONARY EMBOLISM, UNSPECIFIED CHRONICITY, UNSPECIFIED PULMONARY EMBOLISM TYPE, UNSPECIFIED WHETHER ACUTE COR PULMONALE PRESENT (HCC): ICD-10-CM

## 2021-10-15 DIAGNOSIS — Z29.9 DVT PROPHYLAXIS: Primary | ICD-10-CM

## 2021-10-15 LAB — INTERNATIONAL NORMALIZATION RATIO, POC: 1.8

## 2021-10-15 PROCEDURE — 85610 PROTHROMBIN TIME: CPT

## 2021-10-15 PROCEDURE — 99211 OFF/OP EST MAY X REQ PHY/QHP: CPT

## 2021-10-15 NOTE — PROGRESS NOTES
Ms. Man Lopez is a 46 y.o. y/o female with history of DVT, PE in about 2010. She presents today for anticoagulation monitoring and adjustment. Pertinent PMH: DVT assoc with PICC line  Hx of multiple sclerosis,     Patient Reported Findings:  Yes     No  [x]   []       Patient verifies current dosing regimen as listed---> uses AVS --> confirms dose  []   [x]       S/S bleeding/bruising/swelling/SOB -denies   []   [x]       Blood in urine or stool - denies   []   [x]       Procedures scheduled in the future at this time  ---> none upcoming   [x]   []       Missed Dose - states that she missed 2 doses in the last week   []   [x]       Extra Dose - denies   []   [x]       Change in medications-using perc and tylenol prn for pain with new braces- states this will continue---> takes tylenol prn. No abx or other med changes ---> updated med list as pt takes magnesium, vit d, pantoprazole, and singulair---> tylenol and midol with cramps --> some tylenol recently. Did take ceftin for UTI recently.  ---> vantin --> going to take fluconazole x1 tonight --->cipro on in last week but now off, may have a new med prescribed today possibly abx but pt unsure --> will have one day of ceftriaxone followed by two days omnicef.---> baclofen, waiting on PA for abx- cefpodoxime---> done with abx, no changes   []   [x]       Change in health/diet/appetite Normal vegetable is about twice a week and generally a low vitamin K. When she has broccoli it would be in a stir truong. May have a salad only once a month.---> had vegetables, no significant vit k ---> had greens twice since last visit.  States that she had done a detox with tea (haven and turmeric tea) --> greens 2-3 times this last week, no NVD -->  had food poisoning last week, significant vomiting and diarrhea---> no changes, cramps and diarrhea with menstrual cycle this week --> no greens recently d/t lack of appetite / diarrhea --> continues with little vit k ---> no

## 2021-10-25 ENCOUNTER — TELEPHONE (OUTPATIENT)
Dept: PHARMACY | Age: 41
End: 2021-10-25

## 2021-10-25 NOTE — TELEPHONE ENCOUNTER
Pt bellem asking to cancel her [facility] appt tomorrow , wants to r/s for 10/27/21 @ 315. LVM asking pt to call back to confirm.

## 2021-10-27 ENCOUNTER — APPOINTMENT (OUTPATIENT)
Dept: PHARMACY | Age: 41
End: 2021-10-27
Payer: MEDICAID

## 2021-10-27 ENCOUNTER — ANTI-COAG VISIT (OUTPATIENT)
Dept: PHARMACY | Age: 41
End: 2021-10-27
Payer: MEDICAID

## 2021-10-27 DIAGNOSIS — Z29.9 DVT PROPHYLAXIS: Primary | ICD-10-CM

## 2021-10-27 DIAGNOSIS — Z79.01 LONG TERM (CURRENT) USE OF ANTICOAGULANTS: ICD-10-CM

## 2021-10-27 DIAGNOSIS — I26.09 PULMONARY EMBOLISM WITH ACUTE COR PULMONALE, UNSPECIFIED CHRONICITY, UNSPECIFIED PULMONARY EMBOLISM TYPE (HCC): ICD-10-CM

## 2021-10-27 LAB — INTERNATIONAL NORMALIZATION RATIO, POC: 1.5

## 2021-10-27 PROCEDURE — 99211 OFF/OP EST MAY X REQ PHY/QHP: CPT

## 2021-10-27 PROCEDURE — 85610 PROTHROMBIN TIME: CPT

## 2021-10-27 NOTE — PROGRESS NOTES
Ms. Nicholas Muir is a 46 y.o. y/o female with history of DVT, PE in about 2010. She presents today for anticoagulation monitoring and adjustment. Pertinent PMH: DVT assoc with PICC line  Hx of multiple sclerosis,     Patient Reported Findings:  Yes     No  [x]   []       Patient verifies current dosing regimen as listed---> uses AVS --> confirms dose  []   [x]       S/S bleeding/bruising/swelling/SOB -denies   []   [x]       Blood in urine or stool - denies   []   [x]       Procedures scheduled in the future at this time  ---> none upcoming   [x]   []       Missed Dose - missed 1-2 doses   []   [x]       Extra Dose - denies   []   [x]       Change in medications-using perc and tylenol prn for pain with new braces- states this will continue---> takes tylenol prn. No abx or other med changes ---> updated med list as pt takes magnesium, vit d, pantoprazole, and singulair---> tylenol and midol with cramps --> some tylenol recently. Did take ceftin for UTI recently.  ---> vantin --> going to take fluconazole x1 tonight --->cipro on in last week but now off, may have a new med prescribed today possibly abx but pt unsure --> will have one day of ceftriaxone followed by two days omnicef.---> baclofen, waiting on PA for abx- cefpodoxime---> done with abx, no changes---> nitro for UTI   []   [x]       Change in health/diet/appetite Normal vegetable is about twice a week and generally a low vitamin K. When she has broccoli it would be in a stir truong. May have a salad only once a month.---> had vegetables, no significant vit k ---> had greens twice since last visit.  States that she had done a detox with tea (haven and turmeric tea) --> greens 2-3 times this last week, no NVD -->  had food poisoning last week, significant vomiting and diarrhea---> no changes, cramps and diarrhea with menstrual cycle this week --> no greens recently d/t lack of appetite / diarrhea --> continues with little vit k ---> no greens, no NVD --> had salad twice this week and broccoli twice --> no VD, no greens. ----> Once weekly, no NVD --> no changes, no NVD---> had greens 2-3 times in the past week, no NVD --> had 3 servings vit k in the last week---> no changes   []   [x]       Change in alcohol use adamant that she did not drink alcohol while in [place] --> states now that she had one glass of alcohol in [facility]. none recently --> denies  []   [x]       Change in activity  []   [x]       Hospital admission   []   [x]       Emergency department visit-  []   [x]       Other complaints   Clinical Outcomes:  Yes     No  []   [x]       Major bleeding event  []   [x]       Thromboembolic event    Duration of warfarin therapy: Indefinitely  INR Range:  2.0-3.0     Continues to miss apts and miss doses. INR is 1.5 today dt continuous missed doses. Explained need for consistency with diet, and for patient to take her doses and the risks of missing doses. Patient consistently misses doses/apts- unclear as to what is contributing to that (her schedule vs aids vs transportation). Explained will need to do weekly apts and monitor closely since non-compliant. Take 7.5mg tonight then continue weekly dose to 6.25 mg on Sun, Tues, and Thurs and 5 mg all other days of the week. Encouraged to maintain a consistency of vegetables/salads.    Recheck INR in 1 week,      **consent form signed 10/27/2021    Referring PCP is Dr. Leno Park results  INR (no units)   Date Value   10/27/2021 1.5   10/15/2021 1.8   10/05/2021 1.5   2021 1.5   10/05/2020 8.93 (HH)   2020 3.22 (H)   2020 2.16 (H)   2020 3.55 (H)     For Pharmacy Admin Tracking Only     Intervention Detail: Adherence Monitorin and Dose Adjustment: 1, reason: Therapy Optimization   Total # of Interventions Recommended: 2   Total # of Interventions Accepted: 2   Time Spent (min): 15

## 2021-11-04 ENCOUNTER — TELEPHONE (OUTPATIENT)
Dept: PHARMACY | Age: 41
End: 2021-11-04

## 2021-11-09 ENCOUNTER — TELEPHONE (OUTPATIENT)
Dept: PHARMACY | Age: 41
End: 2021-11-09

## 2021-11-09 NOTE — TELEPHONE ENCOUNTER
Returned call to [facility] and [facility] asking if pt needs to bridge with lovenox or pt recent kidney fxn to determine if candidate for bridging. Explained pt noncompliance with clinic visits and pt has been subtherapeutic for extensive period d/t inability to RTC.  Asked for return call with information

## 2021-11-09 NOTE — TELEPHONE ENCOUNTER
----- Message from Morris Garcia sent at 11/9/2021 11:02 AM EST -----  Regarding: Please call  Contact: Jonne Boast, NP [facility]. Dept  Please call Jonne Boast, NP [address] Dept [phone] regarding 11/17 surgery for patient. She is t-o hold warfarin for 5 days prior to and wants to know if there is a plan in place for patient's bridging.

## 2021-11-09 NOTE — TELEPHONE ENCOUNTER
Patient called and LVM requesting a call back. She wanted an appt on 11/11 after 9:30a. Called back and rescheduled for 11/11 @ 10:15a.

## 2021-11-11 ENCOUNTER — ANTI-COAG VISIT (OUTPATIENT)
Dept: PHARMACY | Age: 41
End: 2021-11-11
Payer: MEDICAID

## 2021-11-11 DIAGNOSIS — Z29.9 DVT PROPHYLAXIS: Primary | ICD-10-CM

## 2021-11-11 DIAGNOSIS — I26.09 PULMONARY EMBOLISM WITH ACUTE COR PULMONALE, UNSPECIFIED CHRONICITY, UNSPECIFIED PULMONARY EMBOLISM TYPE (HCC): ICD-10-CM

## 2021-11-11 DIAGNOSIS — Z79.01 LONG TERM (CURRENT) USE OF ANTICOAGULANTS: ICD-10-CM

## 2021-11-11 LAB — INTERNATIONAL NORMALIZATION RATIO, POC: 1.7

## 2021-11-11 PROCEDURE — 99212 OFFICE O/P EST SF 10 MIN: CPT

## 2021-11-11 PROCEDURE — 85610 PROTHROMBIN TIME: CPT

## 2021-11-11 NOTE — PROGRESS NOTES
Ms. Kayden Lozada is a 46 y.o. y/o female with history of DVT, PE in about 2010. She presents today for anticoagulation monitoring and adjustment. Pertinent PMH: DVT assoc with PICC line  Hx of multiple sclerosis,     Patient Reported Findings:  Yes     No  [x]   []       Patient verifies current dosing regimen as listed---> uses AVS --> confirms dose  []   [x]       S/S bleeding/bruising/swelling/SOB -denies   []   [x]       Blood in urine or stool - denies   []   [x]       Procedures scheduled in the future at this time  ---> none upcoming---> states that procedure is NOT scheduled yet and she is supposed to hear today when it will be and will call us, states has to hold 5 days, not sure about lovenox as pt is non-compliant/doesn't wish to do them    [x]   []       Missed Dose - unsure   []   [x]       Extra Dose - denies   []   [x]       Change in medications-using perc and tylenol prn for pain with new braces- states this will continue---> takes tylenol prn. No abx or other med changes ---> updated med list as pt takes magnesium, vit d, pantoprazole, and singulair---> tylenol and midol with cramps --> some tylenol recently. Did take ceftin for UTI recently.  ---> vantin --> going to take fluconazole x1 tonight --->cipro on in last week but now off, may have a new med prescribed today possibly abx but pt unsure --> will have one day of ceftriaxone followed by two days omnicef.---> baclofen, waiting on PA for abx- cefpodoxime---> done with abx, no changes---> nitro for UTI---> hair/skin/nail vitamin, abx before procedure but has not started yet    []   [x]       Change in health/diet/appetite Normal vegetable is about twice a week and generally a low vitamin K. When she has broccoli it would be in a stir truong. May have a salad only once a month.---> had vegetables, no significant vit k ---> had greens twice since last visit.  States that she had done a detox with tea (haven and turmeric tea) --> greens 2-3 times this last week, no NVD -->  had food poisoning last week, significant vomiting and diarrhea---> no changes, cramps and diarrhea with menstrual cycle this week --> no greens recently d/t lack of appetite / diarrhea --> continues with little vit k ---> no greens, no NVD --> had salad twice this week and broccoli twice --> no VD, no greens. ----> Once weekly, no NVD --> no changes, no NVD---> had greens 2-3 times in the past week, no NVD --> had 3 servings vit k in the last week---> no changes   []   [x]       Change in alcohol use adamant that she did not drink alcohol while in [place] --> states now that she had one glass of alcohol in [facility]. none recently --> denies  []   [x]       Change in activity  []   [x]       Hospital admission   []   [x]       Emergency department visit-  []   [x]       Other complaints   Clinical Outcomes:  Yes     No  []   [x]       Major bleeding event  []   [x]       Thromboembolic event    Duration of warfarin therapy: Indefinitely  INR Range:  2.0-3.0     Continues to miss apts and miss doses. INR is 1.7 today dt continuous missed doses. Explained need for consistency with diet, and for patient to take her doses and the risks of missing doses. Patient consistently misses doses/apts- unclear as to what is contributing to that (her schedule vs aids vs transportation). Explained will need to do weekly apts and monitor closely since non-compliant. Call and ask about instructions for procedure and if it is going to be on 11/17 then let us know. If you are having the procedure 11/17 then you will need to start holding tomorrow, 11/12 for 5 days. Resume your warfarin on 11/17 and take 7.5mg then continue weekly dose to 6.25 mg on Sun, Tues, and Thurs and 5 mg all other days of the week. Encouraged to maintain a consistency of vegetables/salads.    Recheck INR in 2 weeks, 11/24- 1 week after procedure (will need to change if RS or needs lovenox)     **consent form signed

## 2021-11-12 ENCOUNTER — TELEPHONE (OUTPATIENT)
Dept: PHARMACY | Age: 41
End: 2021-11-12

## 2021-11-12 NOTE — TELEPHONE ENCOUNTER
Pt called and and LVM stating that she is having procedure on 11/17. Returned call to patient and advised to follow instructions given at appt. Pt is not going to use lovenox.

## 2021-11-19 ENCOUNTER — TELEPHONE (OUTPATIENT)
Dept: PHARMACY | Age: 41
End: 2021-11-19

## 2021-11-19 NOTE — TELEPHONE ENCOUNTER
----- Message from Bhakti Stout sent at 11/19/2021  1:43 PM EST -----  Pt states she was supposed to call pharmacist after she had her surgery .  [phone]

## 2021-11-19 NOTE — TELEPHONE ENCOUNTER
Called patient to discuss. LVM. **of note pt procedure 11/17 (confirmed at phone call 11/12) and was given holding instructions at apt 11/11. No lovenox. Proceed as discussed at apt 11/11 and pt already scheduled for follow-up 11/24 (1 week after procedure).      Rakan Castañeda, PharmD, [facility]

## 2021-11-19 NOTE — TELEPHONE ENCOUNTER
Called patient, explained to follow AVS/dosing instructions and RTC 11/24.     Rosalina Galindo, PharmD, h

## 2021-11-24 ENCOUNTER — TELEPHONE (OUTPATIENT)
Dept: PHARMACY | Age: 41
End: 2021-11-24

## 2021-11-24 ENCOUNTER — APPOINTMENT (OUTPATIENT)
Dept: PHARMACY | Age: 41
End: 2021-11-24
Payer: MEDICAID

## 2021-11-24 NOTE — TELEPHONE ENCOUNTER
Pt called to cancel CC appt today, states shes cramping and in a lot of pain. Will call back on Tuesday to r/s.

## 2021-12-16 ENCOUNTER — ANTI-COAG VISIT (OUTPATIENT)
Dept: PHARMACY | Age: 41
End: 2021-12-16
Payer: MEDICAID

## 2021-12-16 DIAGNOSIS — Z79.01 LONG TERM (CURRENT) USE OF ANTICOAGULANTS: ICD-10-CM

## 2021-12-16 DIAGNOSIS — Z29.9 DVT PROPHYLAXIS: Primary | ICD-10-CM

## 2021-12-16 DIAGNOSIS — I26.99 PULMONARY EMBOLISM, OTHER, UNSPECIFIED CHRONICITY, UNSPECIFIED WHETHER ACUTE COR PULMONALE PRESENT (HCC): ICD-10-CM

## 2021-12-16 LAB — INTERNATIONAL NORMALIZATION RATIO, POC: 1.5

## 2021-12-16 PROCEDURE — 85610 PROTHROMBIN TIME: CPT

## 2021-12-16 PROCEDURE — 99211 OFF/OP EST MAY X REQ PHY/QHP: CPT

## 2021-12-16 NOTE — PROGRESS NOTES
Ms. Aura Peralta is a 46 y.o. y/o female with history of DVT, PE in about 2010. She presents today for anticoagulation monitoring and adjustment. Pertinent PMH: DVT assoc with PICC line  Hx of multiple sclerosis,     Patient Reported Findings:  Yes     No  [x]   []       Patient verifies current dosing regimen as listed---> uses AVS --> confirms dose  []   [x]       S/S bleeding/bruising/swelling/SOB -denies   []   [x]       Blood in urine or stool - denies   []   [x]       Procedures scheduled in the future at this time  ---> none upcoming---> states that procedure is NOT scheduled yet and she is supposed to hear today when it will be and will call us, states has to hold 5 days, not sure about lovenox as pt is non-compliant/doesn't wish to do them    [x]   []       Missed Dose - denies   []   [x]       Extra Dose - denies   []   [x]       Change in medications-using perc and tylenol prn for pain with new braces- states this will continue---> takes tylenol prn. No abx or other med changes ---> updated med list as pt takes magnesium, vit d, pantoprazole, and singulair---> tylenol and midol with cramps --> some tylenol recently. Did take ceftin for UTI recently.  ---> vantin --> going to take fluconazole x1 tonight --->cipro on in last week but now off, may have a new med prescribed today possibly abx but pt unsure --> will have one day of ceftriaxone followed by two days omnicef.---> baclofen, waiting on PA for abx- cefpodoxime---> done with abx, no changes---> nitro for UTI---> hair/skin/nail vitamin, abx before procedure but has not started yet --> taking sea rivero  []   [x]       Change in health/diet/appetite Normal vegetable is about twice a week and generally a low vitamin K. When she has broccoli it would be in a stir truong. May have a salad only once a month.---> had vegetables, no significant vit k ---> had greens twice since last visit.  States that she had done a detox with tea (haven and turmeric tea) --> greens 2-3 times this last week, no NVD -->  had food poisoning last week, significant vomiting and diarrhea---> no changes, cramps and diarrhea with menstrual cycle this week --> no greens recently d/t lack of appetite / diarrhea --> continues with little vit k ---> no greens, no NVD --> had salad twice this week and broccoli twice --> no VD, no greens. ----> Once weekly, no NVD --> no changes, no NVD---> had greens 2-3 times in the past week, no NVD --> had 3 servings vit k in the last week---> no changes, no NVD  []   [x]       Change in alcohol use adamant that she did not drink alcohol while in [place] --> states now that she had one glass of alcohol in [facility]. none recently --> denies  []   [x]       Change in activity  []   [x]       Hospital admission   []   [x]       Emergency department visit-  []   [x]       Other complaints   Clinical Outcomes:  Yes     No  []   [x]       Major bleeding event  []   [x]       Thromboembolic event    Duration of warfarin therapy: Indefinitely  INR Range:  2.0-3.0     Continues to miss apts and miss doses. INR is 1.5 d/t unknown etiology  Upon further investigation, patient states she has been taking a spoonful of sea rivero daily. Unsure how long she has been taking it or if the clinic knew about it previous to this. Likely contains a significant amount of vit K. Could very well be the reason patient's INR has been consistently low. Difficult to tell as patient has been non-compliant with appointments and dosing the last few months. Will increase dose for now. Take 7.5mg tonight then increase dose to 5mg on Mon, Wed, and Fri and 6.25mg all other days. Encouraged to maintain a consistency of vegetables/salads.    Recheck INR in 1 week, 12/22    **consent form signed 10/27/2021    Referring PCP is Dr. Kathleen Connelly results  INR (no units)   Date Value   12/16/2021 1.5   11/11/2021 1.7   10/27/2021 1.5   10/15/2021 1.8   10/05/2020 8.93 ([facility]) 2020 3.22 (H)   2020 2.16 (H)   2020 3.55 (H)     For Pharmacy Admin Tracking Only     Intervention Detail: Adherence Monitorin and Dose Adjustment: 1, reason: Therapy Optimization   Total # of Interventions Recommended: 2   Total # of Interventions Accepted: 2   Time Spent (min): 15

## 2021-12-22 ENCOUNTER — APPOINTMENT (OUTPATIENT)
Dept: PHARMACY | Age: 41
End: 2021-12-22
Payer: MEDICAID

## 2021-12-22 ENCOUNTER — TELEPHONE (OUTPATIENT)
Dept: PHARMACY | Age: 41
End: 2021-12-22

## 2021-12-22 NOTE — TELEPHONE ENCOUNTER
Pt called to cancel CC appt today. She has a new hh aide pt r/s on 12/28 she may be a few minutes late.

## 2021-12-28 ENCOUNTER — ANTI-COAG VISIT (OUTPATIENT)
Dept: PHARMACY | Age: 41
End: 2021-12-28
Payer: MEDICAID

## 2021-12-28 DIAGNOSIS — Z79.01 LONG TERM (CURRENT) USE OF ANTICOAGULANTS: ICD-10-CM

## 2021-12-28 DIAGNOSIS — Z29.9 DVT PROPHYLAXIS: Primary | ICD-10-CM

## 2021-12-28 DIAGNOSIS — I26.99 PULMONARY EMBOLISM, UNSPECIFIED CHRONICITY, UNSPECIFIED PULMONARY EMBOLISM TYPE, UNSPECIFIED WHETHER ACUTE COR PULMONALE PRESENT (HCC): ICD-10-CM

## 2021-12-28 LAB — INTERNATIONAL NORMALIZATION RATIO, POC: 1.2

## 2021-12-28 PROCEDURE — 85610 PROTHROMBIN TIME: CPT

## 2021-12-28 PROCEDURE — 99212 OFFICE O/P EST SF 10 MIN: CPT

## 2021-12-28 NOTE — PROGRESS NOTES
Ms. Toya Corrales is a 46 y.o. y/o female with history of DVT, PE in about 2010. She presents today for anticoagulation monitoring and adjustment. Pertinent PMH: DVT assoc with PICC line  Hx of multiple sclerosis,     Patient Reported Findings:  Yes     No  []   [x]       Patient verifies current dosing regimen as listed---> uses AVS --> confirms dose --> does not know dose, but takes what RN gives her but then states that she might have taken 5 mg daily but took old dose. Does not know dose, uses AVS but AVS ran out last week so she is unsure after that   []   [x]       S/S bleeding/bruising/swelling/SOB -denies   []   [x]       Blood in urine or stool - denies   []   [x]       Procedures scheduled in the future at this time  ---> none upcoming---> states that procedure is NOT scheduled yet and she is supposed to hear today when it will be and will call us, states has to hold 5 days, not sure about lovenox as pt is non-compliant/doesn't wish to do them    [x]   []       Missed Dose - 2 days ago   []   [x]       Extra Dose - denies   [x]   []       Change in medications-using perc and tylenol prn for pain with new braces- states this will continue---> takes tylenol prn ---> nitro for UTI---> hair/skin/nail vitamin, abx before procedure but has not started yet --> taking sea rivero  [x]   []       Change in health/diet/appetite Normal vegetable is about twice a week and generally a low vitamin K. When she has broccoli it would be in a stir truong. May have a salad only once a month.---> had vegetables, no significant vit k ---> had greens twice since last visit.  States that she had done a detox with tea (haven and turmeric tea)  had greens 2-3 times in the past week, no NVD --> had 3 servings vit k in the last week---> states that she had significant amount of greens over the holiday   []   [x]       Change in alcohol use adamant that she did not drink alcohol while in [place] --> states now that she had one

## 2022-01-05 ENCOUNTER — ANTI-COAG VISIT (OUTPATIENT)
Dept: PHARMACY | Age: 42
End: 2022-01-05
Payer: MEDICAID

## 2022-01-05 DIAGNOSIS — Z29.9 DVT PROPHYLAXIS: Primary | ICD-10-CM

## 2022-01-05 DIAGNOSIS — I26.99 PULMONARY EMBOLISM, OTHER, UNSPECIFIED CHRONICITY, UNSPECIFIED WHETHER ACUTE COR PULMONALE PRESENT (HCC): ICD-10-CM

## 2022-01-05 DIAGNOSIS — Z79.01 LONG TERM (CURRENT) USE OF ANTICOAGULANTS: ICD-10-CM

## 2022-01-05 LAB — INTERNATIONAL NORMALIZATION RATIO, POC: 1.8

## 2022-01-05 PROCEDURE — 85610 PROTHROMBIN TIME: CPT

## 2022-01-05 PROCEDURE — 99211 OFF/OP EST MAY X REQ PHY/QHP: CPT

## 2022-01-05 NOTE — PROGRESS NOTES
Ms. Rg Mello is a 46 y.o. y/o female with history of DVT, PE in about 2010. She presents today for anticoagulation monitoring and adjustment. Pertinent PMH: DVT assoc with PICC line  Hx of multiple sclerosis,     Patient Reported Findings:  Yes     No  []   [x]       Patient verifies current dosing regimen as listed---> uses AVS --> confirms dose --> does not know dose, but takes what RN gives her but then states that she might have taken 5 mg daily but took old dose. Does not know dose, uses AVS but AVS ran out last week so she is unsure after that   []   [x]       S/S bleeding/bruising/swelling/SOB -denies   []   [x]       Blood in urine or stool - denies   []   [x]       Procedures scheduled in the future at this time  ---> none upcoming---> states that procedure is NOT scheduled yet and she is supposed to hear today when it will be and will call us, states has to hold 5 days, not sure about lovenox as pt is non-compliant/doesn't wish to do them    [x]   []       Missed Dose - denies    []   [x]       Extra Dose - denies   [x]   []       Change in medications-using perc and tylenol prn for pain with new braces- states this will continue---> takes tylenol prn ---> nitro for UTI---> hair/skin/nail vitamin, abx before procedure but has not started yet --> taking sea rivero --> no changes   [x]   []       Change in health/diet/appetite Normal vegetable is about twice a week and generally a low vitamin K. When she has broccoli it would be in a stir truong. May have a salad only once a month.---> had vegetables, no significant vit k ---> had greens twice since last visit.  States that she had done a detox with tea (haven and turmeric tea)  had greens 2-3 times in the past week, no NVD --> had 3 servings vit k in the last week---> states that she had significant amount of greens over the holiday --> no changes, no NVD   []   [x]       Change in alcohol use adamant that she did not drink alcohol while in [place] --> states now that she had one glass of alcohol in [place]. none recently --> denies  []   [x]       Change in activity  []   [x]       Hospital admission   []   [x]       Emergency department visit-  []   [x]       Other complaints   Clinical Outcomes:  Yes     No  []   [x]       Major bleeding event  []   [x]       Thromboembolic event    Duration of warfarin therapy: Indefinitely  INR Range:  2.0-3.0     Continues to miss apts and miss doses. INR is 1.8 today after dose increase at last visit. Will increase dose slightly to mirror previous 7 days and monitor patient closely until stable. Patient did not bring in AVS.  Increase dose to 5mg on Mon and Fri and 6.25mg all other days. Encouraged to maintain a consistency of vegetables/salads.    Recheck INR in 2 week,     **consent form signed 10/27/2021    Referring PCP is Dr. Nadine Hernandez results  INR (no units)   Date Value   2021 1.2   2021 1.5   2021 1.7   10/27/2021 1.5   10/05/2020 8.93 (HH)   2020 3.22 (H)   2020 2.16 (H)   2020 3.55 (H)   For Pharmacy Admin Tracking Only     Intervention Detail: Adherence Monitorin and Dose Adjustment: 1, reason: Therapy Optimization   Total # of Interventions Recommended: 2   Total # of Interventions Accepted: 2   Time Spent (min): 15

## 2022-01-19 ENCOUNTER — ANTI-COAG VISIT (OUTPATIENT)
Dept: PHARMACY | Age: 42
End: 2022-01-19
Payer: MEDICAID

## 2022-01-19 DIAGNOSIS — I26.99 PULMONARY EMBOLISM, UNSPECIFIED CHRONICITY, UNSPECIFIED PULMONARY EMBOLISM TYPE, UNSPECIFIED WHETHER ACUTE COR PULMONALE PRESENT (HCC): ICD-10-CM

## 2022-01-19 DIAGNOSIS — Z79.01 LONG TERM (CURRENT) USE OF ANTICOAGULANTS: ICD-10-CM

## 2022-01-19 DIAGNOSIS — Z29.9 DVT PROPHYLAXIS: Primary | ICD-10-CM

## 2022-01-19 LAB — INTERNATIONAL NORMALIZATION RATIO, POC: 1.8

## 2022-01-19 PROCEDURE — 85610 PROTHROMBIN TIME: CPT

## 2022-01-19 PROCEDURE — 99212 OFFICE O/P EST SF 10 MIN: CPT

## 2022-01-19 RX ORDER — ALENDRONATE SODIUM 70 MG/1
70 TABLET ORAL
COMMUNITY
Start: 2021-03-29

## 2022-01-19 RX ORDER — MIRABEGRON 50 MG/1
TABLET, FILM COATED, EXTENDED RELEASE ORAL
COMMUNITY
Start: 2021-11-19

## 2022-01-19 NOTE — PROGRESS NOTES
Ms. Barbara Noe is a 46 y.o. y/o female with history of DVT, PE in about 2010. She presents today for anticoagulation monitoring and adjustment. Pertinent PMH: DVT assoc with PICC line  Hx of multiple sclerosis,     Patient Reported Findings:  Yes     No  []   [x]       Patient verifies current dosing regimen as listed---> uses AVS --> confirms dose --> does not know dose, but takes what RN gives her but then states that she might have taken 5 mg daily but took old dose. Does not know dose, uses AVS but AVS ran out last week so she is unsure after that   []   [x]       S/S bleeding/bruising/swelling/SOB -denies   []   [x]       Blood in urine or stool - denies   []   [x]       Procedures scheduled in the future at this time  ---> none upcoming---> states that procedure is NOT scheduled yet and she is supposed to hear today when it will be and will call us, states has to hold 5 days, not sure about lovenox as pt is non-compliant/doesn't wish to do them    []   [x]       Missed Dose - denies    []   [x]       Extra Dose - denies   [x]   []       Change in medications-using perc and tylenol prn for pain with new braces- states this will continue---> takes tylenol prn ---> nitro for UTI---> hair/skin/nail vitamin, abx before procedure but has not started yet --> taking sea rivero --> no changes   [x]   []       Change in health/diet/appetite Normal vegetable is about twice a week and generally a low vitamin K. When she has broccoli it would be in a stir truong. May have a salad only once a month.---> had vegetables, no significant vit k ---> had greens twice since last visit.  States that she had done a detox with tea (haven and turmeric tea)  had greens 2-3 times in the past week, no NVD --> had 3 servings vit k in the last week---> states that she had significant amount of greens over the holiday --> no changes, no NVD --> has more vit k this week than previous (asparagus, spinach, broccoli, zucchini)  []   [x] Change in alcohol use adamant that she did not drink alcohol while in [place] --> states now that she had one glass of alcohol in [facility]. none recently --> denies  []   [x]       Change in activity  []   [x]       Hospital admission   []   [x]       Emergency department visit-  []   [x]       Other complaints   Clinical Outcomes:  Yes     No  []   [x]       Major bleeding event  []   [x]       Thromboembolic event    Duration of warfarin therapy: Indefinitely  INR Range:  2.0-3.0     Continues to miss apts and miss doses. INR is 1.8 today   Patient did not bring in AVS.  Increase dose to 5mg on Mon and 6.25mg all other days. (3% inc)  Encouraged to maintain a consistency of vegetables/salads.    Recheck INR in 2 weeks,     **consent form signed 10/27/2021    Referring PCP is Dr. Polanco Sample results  INR (no units)   Date Value   2022 1.8   2021 1.2   2021 1.5   2021 1.7   10/05/2020 8.93 (HH)   2020 3.22 (H)   2020 2.16 (H)   2020 3.55 (H)   For Pharmacy Admin Tracking Only     Intervention Detail: Adherence Monitorin and Dose Adjustment: 1, reason: Therapy Optimization   Total # of Interventions Recommended: 2   Total # of Interventions Accepted: 2   Time Spent (min): 15

## 2022-02-01 ENCOUNTER — TELEPHONE (OUTPATIENT)
Dept: PHARMACY | Age: 42
End: 2022-02-01

## 2022-02-08 NOTE — TELEPHONE ENCOUNTER
Pt called and cancelled appt for today 2/8, cant get out d/t ice.  States that she will call back tomorrow to r/s once gets RN schedule

## 2022-02-17 ENCOUNTER — ANTI-COAG VISIT (OUTPATIENT)
Dept: PHARMACY | Age: 42
End: 2022-02-17
Payer: MEDICAID

## 2022-02-17 DIAGNOSIS — I26.99 PULMONARY EMBOLISM, OTHER, UNSPECIFIED CHRONICITY, UNSPECIFIED WHETHER ACUTE COR PULMONALE PRESENT (HCC): ICD-10-CM

## 2022-02-17 DIAGNOSIS — Z79.01 LONG TERM (CURRENT) USE OF ANTICOAGULANTS: ICD-10-CM

## 2022-02-17 DIAGNOSIS — Z29.9 DVT PROPHYLAXIS: Primary | ICD-10-CM

## 2022-02-17 LAB — INTERNATIONAL NORMALIZATION RATIO, POC: 4.7

## 2022-02-17 PROCEDURE — 99211 OFF/OP EST MAY X REQ PHY/QHP: CPT

## 2022-02-17 PROCEDURE — 85610 PROTHROMBIN TIME: CPT

## 2022-02-17 NOTE — PROGRESS NOTES
Ms. Enrrique Jean-Baptiste is a 46 y.o. y/o female with history of DVT, PE in about 2010. She presents today for anticoagulation monitoring and adjustment. Pertinent PMH: DVT assoc with PICC line  Hx of multiple sclerosis,     Patient Reported Findings:  Yes     No  []   [x]       Patient verifies current dosing regimen as listed---> uses AVS --> confirms dose --> does not know dose, but takes what RN gives her but then states that she might have taken 5 mg daily but took old dose. Does not know dose, uses AVS but AVS ran out last week so she is unsure after that   []   [x]       S/S bleeding/bruising/swelling/SOB -denies   []   [x]       Blood in urine or stool - denies   []   [x]       Procedures scheduled in the future at this time  ---> none upcoming---> states that procedure is NOT scheduled yet and she is supposed to hear today when it will be and will call us, states has to hold 5 days, not sure about lovenox as pt is non-compliant/doesn't wish to do them    []   [x]       Missed Dose - denies    []   [x]       Extra Dose - denies   [x]   []       Change in medications-using perc and tylenol prn for pain with new braces- states this will continue---> takes tylenol prn ---> nitro for UTI---> hair/skin/nail vitamin, abx before procedure but has not started yet --> taking sea rivero --> no changes   [x]   []       Change in health/diet/appetite Normal vegetable is about twice a week and generally a low vitamin K. When she has broccoli it would be in a stir truong. May have a salad only once a month.---> had vegetables, no significant vit k ---> had greens twice since last visit.  States that she had done a detox with tea (haven and turmeric tea)  had greens 2-3 times in the past week, no NVD --> had 3 servings vit k in the last week---> states that she had significant amount of greens over the holiday --> no changes, no NVD --> has more vit k this week than previous (asparagus, spinach, broccoli, zucchini) --> no changes, greens 2-3 times, no NVD   []   [x]       Change in alcohol use adamant that she did not drink alcohol while in [place] --> states now that she had one glass of alcohol in [facility]. none recently --> denies  []   [x]       Change in activity  []   [x]       Hospital admission   []   [x]       Emergency department visit-  []   [x]       Other complaints   Clinical Outcomes:  Yes     No  []   [x]       Major bleeding event  []   [x]       Thromboembolic event    Duration of warfarin therapy: Indefinitely  INR Range:  2.0-3.0     Continues to miss apts and miss doses. INR is 4.7 today after dose increase at    Patient did not bring in AVS.  Patient states no changes, no extra doses. Unable to discern reason for INR as patient does not offer any information. Will hold tonight and then decrease dose. Hold tonight then decrease dose to 5mg on Mon and Fri and 6.25mg all other days    Encouraged to maintain a consistency of vegetables/salads.    Recheck INR in 2 weeks, 3/3    **consent form signed 10/27/2021    Referring PCP is Dr. Mariza Villa results  INR (no units)   Date Value   2022 1.8   2022 1.8   2021 1.2   2021 1.5   10/05/2020 8.93 (HH)   2020 3.22 (H)   2020 2.16 (H)   2020 3.55 (H)   For Pharmacy Admin Tracking Only     Intervention Detail: Adherence Monitorin and Dose Adjustment: 1, reason: Therapy Optimization   Total # of Interventions Recommended: 2   Total # of Interventions Accepted: 2   Time Spent (min): 15

## 2022-03-04 ENCOUNTER — TELEPHONE (OUTPATIENT)
Dept: PHARMACY | Age: 42
End: 2022-03-04

## 2022-03-08 ENCOUNTER — ANTI-COAG VISIT (OUTPATIENT)
Dept: PHARMACY | Age: 42
End: 2022-03-08
Payer: MEDICAID

## 2022-03-08 DIAGNOSIS — Z29.9 DVT PROPHYLAXIS: Primary | ICD-10-CM

## 2022-03-08 DIAGNOSIS — Z79.01 LONG TERM (CURRENT) USE OF ANTICOAGULANTS: ICD-10-CM

## 2022-03-08 DIAGNOSIS — I26.99 PULMONARY EMBOLISM, UNSPECIFIED CHRONICITY, UNSPECIFIED PULMONARY EMBOLISM TYPE, UNSPECIFIED WHETHER ACUTE COR PULMONALE PRESENT (HCC): ICD-10-CM

## 2022-03-08 LAB — INTERNATIONAL NORMALIZATION RATIO, POC: 6.1

## 2022-03-08 PROCEDURE — 99212 OFFICE O/P EST SF 10 MIN: CPT

## 2022-03-08 PROCEDURE — 85610 PROTHROMBIN TIME: CPT

## 2022-03-08 NOTE — PROGRESS NOTES
Ms. Leona Rosario is a 46 y.o. y/o female with history of DVT, PE in about 2010. She presents today for anticoagulation monitoring and adjustment. Pertinent PMH: DVT assoc with PICC line  Hx of multiple sclerosis,     Patient Reported Findings:  Yes     No  []   [x]       Patient verifies current dosing regimen as listed---> uses AVS --> confirms dose --> does not know dose, but takes what RN gives her but then states that she might have taken 5 mg daily but took old dose. Does not know dose, uses AVS but AVS ran out last week so she is unsure after that --> states that aide followed avs for doses   []   [x]       S/S bleeding/bruising/swelling/SOB -denies   []   [x]       Blood in urine or stool - denies   []   [x]       Procedures scheduled in the future at this time  ---> none upcoming---> states that procedure is NOT scheduled yet and she is supposed to hear today when it will be and will call us, states has to hold 5 days, not sure about lovenox as pt is non-compliant/doesn't wish to do them    [x]   []       Missed Dose - 2 doses within last week d/t being ill      []   [x]       Extra Dose - denies   [x]   []       Change in medications-using perc and tylenol prn for pain with new braces- states this will continue---> takes tylenol prn ---> nitro for UTI---> hair/skin/nail vitamin, abx before procedure but has not started yet --> taking sea rivero --> no changes   [x]   []       Change in health/diet/appetite Normal vegetable is about twice a week and generally a low vitamin K. When she has broccoli it would be in a stir truong. May have a salad only once a month.---> had vegetables, no significant vit k ---> had greens twice since last visit.  States that she had done a detox with tea (haven and turmeric tea)  had greens 2-3 times in the past week, no NVD --> had 3 servings vit k in the last week---> states that she had significant amount of greens over the holiday --> no changes, no NVD --> has more vit k this week than previous (asparagus, spinach, broccoli, zucchini) --> no changes, greens 2-3 times, no NVD --> no vomiting. No greens d/t being ill.   []   [x]       Change in alcohol use adamant that she did not drink alcohol while in [place] --> states now that she had one glass of alcohol in [facility]. none recently --> denies  []   [x]       Change in activity  []   [x]       Hospital admission   []   [x]       Emergency department visit-  []   [x]       Other complaints   Clinical Outcomes:  Yes     No  []   [x]       Major bleeding event  []   [x]       Thromboembolic event    Duration of warfarin therapy: Indefinitely  INR Range:  2.0-3.0     Continues to miss apts and miss doses. INR is 6.1 today  Patient did not bring in AVS.  Patient states no changes, no extra doses. Unable to discern reason for INR as patient does not offer any information. Will hold tonight and then decrease dose. Hold tonight and tomorrow then decrease weekly dose to 6.25 mg on Sun, Tues and Thurs and 5 mg all other days of the week (6% dec)   Encouraged to maintain a consistency of vegetables/salads.    Recheck INR in 1 week, 3/15    **consent form signed 10/27/2021    Referring PCP is Dr. Fatmata Shaver results  INR (no units)   Date Value   2022 4.7   2022 1.8   2022 1.8   2021 1.2   10/05/2020 8.93 (HH)   2020 3.22 (H)   2020 2.16 (H)   2020 3.55 (H)   For Pharmacy Admin Tracking Only     Intervention Detail: Adherence Monitorin and Dose Adjustment: 1, reason: Therapy Optimization   Total # of Interventions Recommended: 2   Total # of Interventions Accepted: 2   Time Spent (min): 15

## 2022-03-15 ENCOUNTER — APPOINTMENT (OUTPATIENT)
Dept: PHARMACY | Age: 42
End: 2022-03-15
Payer: MEDICAID

## 2022-03-17 ENCOUNTER — ANTI-COAG VISIT (OUTPATIENT)
Dept: PHARMACY | Age: 42
End: 2022-03-17
Payer: MEDICAID

## 2022-03-17 DIAGNOSIS — I26.09 PULMONARY EMBOLISM WITH ACUTE COR PULMONALE, UNSPECIFIED CHRONICITY, UNSPECIFIED PULMONARY EMBOLISM TYPE (HCC): ICD-10-CM

## 2022-03-17 DIAGNOSIS — Z79.01 LONG TERM (CURRENT) USE OF ANTICOAGULANTS: ICD-10-CM

## 2022-03-17 DIAGNOSIS — Z29.9 DVT PROPHYLAXIS: Primary | ICD-10-CM

## 2022-03-17 LAB — INTERNATIONAL NORMALIZATION RATIO, POC: 4.1

## 2022-03-17 PROCEDURE — 99212 OFFICE O/P EST SF 10 MIN: CPT

## 2022-03-17 PROCEDURE — 85610 PROTHROMBIN TIME: CPT

## 2022-03-17 NOTE — PROGRESS NOTES
changes, no NVD --> has more vit k this week than previous (asparagus, spinach, broccoli, zucchini) --> no changes, greens 2-3 times, no NVD --> no vomiting. No greens d/t being ill. ---> states went back to [place], has a bunch ready for tonight for 's Day, no NVD  []   [x]       Change in alcohol use adamant that she did not drink alcohol while in [place] --> states now that she had one glass of alcohol in [facility]. none recently --> denies--->none  []   [x]       Change in activity  []   [x]       Hospital admission   []   [x]       Emergency department visit-  []   [x]       Other complaints   Clinical Outcomes:  Yes     No  []   [x]       Major bleeding event  []   [x]       Thromboembolic event    Duration of warfarin therapy: Indefinitely  INR Range:  2.0-3.0     Continues to miss apts and miss doses. Pt rarely offers up any information to determine cause of INR fluctuations. INR is 4.1 today dt abx and tylenol recently. Dose decreased at last visit dt INR of 6.1 (pt states was sick, had more pain meds, and didn't eat greens). Hold tonight then continue taking dose of 6.25 mg on Sun, Tues and Thurs and 5 mg all other days of the week. Encouraged to maintain a consistency of vegetables/salads.    Recheck INR in 1 week, 3/24    **consent form signed 10/27/2021    Referring PCP is Dr. Brittanie Maria results  INR (no units)   Date Value   2022 4.1   2022 6.1   2022 4.7   2022 1.8   10/05/2020 8.93 (HH)   2020 3.22 (H)   2020 2.16 (H)   2020 3.55 (H)   For Pharmacy Admin Tracking Only     Intervention Detail: Adherence Monitorin and Dose Adjustment: 1, reason: Therapy Optimization   Total # of Interventions Recommended: 2   Total # of Interventions Accepted: 2   Time Spent (min): 15

## 2022-03-24 ENCOUNTER — ANTI-COAG VISIT (OUTPATIENT)
Dept: PHARMACY | Age: 42
End: 2022-03-24
Payer: MEDICAID

## 2022-03-24 DIAGNOSIS — Z29.9 DVT PROPHYLAXIS: Primary | ICD-10-CM

## 2022-03-24 DIAGNOSIS — I26.99 PULMONARY EMBOLISM, UNSPECIFIED CHRONICITY, UNSPECIFIED PULMONARY EMBOLISM TYPE, UNSPECIFIED WHETHER ACUTE COR PULMONALE PRESENT (HCC): ICD-10-CM

## 2022-03-24 DIAGNOSIS — Z79.01 LONG TERM (CURRENT) USE OF ANTICOAGULANTS: ICD-10-CM

## 2022-03-24 LAB — INTERNATIONAL NORMALIZATION RATIO, POC: 3.2

## 2022-03-24 PROCEDURE — 99212 OFFICE O/P EST SF 10 MIN: CPT

## 2022-03-24 PROCEDURE — 85610 PROTHROMBIN TIME: CPT

## 2022-03-24 NOTE — PROGRESS NOTES
Ms. Yolis Delgado is a 46 y.o. y/o female with history of DVT, PE in about 2010. She presents today for anticoagulation monitoring and adjustment. Pertinent PMH: DVT assoc with PICC line  Hx of multiple sclerosis,     Patient Reported Findings:  Yes     No  []   [x]       Patient verifies current dosing regimen as listed---> uses AVS --> confirms dose --> does not know dose, but takes what RN gives her but then states that she might have taken 5 mg daily but took old dose. Does not know dose, uses AVS but AVS ran out last week so she is unsure after that --> states that aide followed avs for doses   []   [x]       S/S bleeding/bruising/swelling/SOB -denies   []   [x]       Blood in urine or stool - denies   []   [x]       Procedures scheduled in the future at this time  ---> none upcoming---> states that procedure is NOT scheduled yet and she is supposed to hear today when it will be and will call us, states has to hold 5 days, not sure about lovenox as pt is non-compliant/doesn't wish to do them    []   [x]       Missed Dose - denies   []   [x]       Extra Dose - denies   [x]   []       Change in medications-using perc and tylenol prn for pain with new braces- states this will continue---> takes tylenol prn ---> cefpodoxil finished several days ago, had several tylenol --> no more tylenol, no other changes in meds    [x]   []       Change in health/diet/appetite Normal vegetable is about twice a week and generally a low vitamin K. When she has broccoli it would be in a stir truong. May have a salad only once a month.---> had vegetables, no significant vit k ---> had greens twice since last visit. States that she had done a detox with tea (haven and turmeric tea)  had greens 2-3 times in the past week, no NVD --> had 3 servings vit k in the last week---> has more vit k this week than previous (asparagus, spinach, broccoli, zucchini) --> no changes, greens 2-3 times, no NVD --> no vomiting.  No greens d/t being ill. ---> states went back to [place], has a bunch ready for tonight for [facility] [place] [employer], no NVD --> had greens 3x this week  []   [x]       Change in alcohol use adamant that she did not drink alcohol while in [place] --> states now that she had one glass of alcohol in [facility]. none recently --> denies--->none  []   [x]       Change in activity  []   [x]       Hospital admission   []   [x]       Emergency department visit-  []   [x]       Other complaints   Clinical Outcomes:  Yes     No  []   [x]       Major bleeding event  []   [x]       Thromboembolic event    Duration of warfarin therapy: Indefinitely  INR Range:  2.0-3.0     Continues to miss apts and miss doses. Pt rarely offers up any information to determine cause of INR fluctuations. INR is 3.2 today    Decrease weekly dose to 6.25 mg on Sun and 5 mg all other days of the week (6% dec)  Encouraged to maintain a consistency of vegetables/salads.    Recheck INR in 2 weeks,     **consent form signed 10/27/2021    Referring PCP is Dr. Med Mejia results  INR (no units)   Date Value   2022 4.1   2022 6.1   2022 4.7   2022 1.8   10/05/2020 8.93 (HH)   2020 3.22 (H)   2020 2.16 (H)   2020 3.55 (H)       For Pharmacy Admin Tracking Only     Intervention Detail: Adherence Monitorin and Dose Adjustment: 1, reason: Therapy Optimization   Total # of Interventions Recommended: 2   Total # of Interventions Accepted: 2   Time Spent (min): 15

## 2022-04-07 ENCOUNTER — ANTI-COAG VISIT (OUTPATIENT)
Dept: PHARMACY | Age: 42
End: 2022-04-07
Payer: MEDICAID

## 2022-04-07 DIAGNOSIS — Z29.9 DVT PROPHYLAXIS: Primary | ICD-10-CM

## 2022-04-07 DIAGNOSIS — Z79.01 LONG TERM (CURRENT) USE OF ANTICOAGULANTS: ICD-10-CM

## 2022-04-07 DIAGNOSIS — I26.99 PULMONARY EMBOLISM, UNSPECIFIED CHRONICITY, UNSPECIFIED PULMONARY EMBOLISM TYPE, UNSPECIFIED WHETHER ACUTE COR PULMONALE PRESENT (HCC): ICD-10-CM

## 2022-04-07 LAB — INTERNATIONAL NORMALIZATION RATIO, POC: 5.7

## 2022-04-07 PROCEDURE — 85610 PROTHROMBIN TIME: CPT

## 2022-04-07 PROCEDURE — 99212 OFFICE O/P EST SF 10 MIN: CPT

## 2022-04-07 NOTE — PROGRESS NOTES
Ms. Montez Gill is a 46 y.o. y/o female with history of DVT, PE in about 2010. She presents today for anticoagulation monitoring and adjustment. Pertinent PMH: DVT assoc with PICC line  Hx of multiple sclerosis,     Patient Reported Findings:  Yes     No  [x]   []       Patient verifies current dosing regimen as listed---> uses AVS --> confirms dose --> does not know dose, but takes what RN gives her but then states that she might have taken 5 mg daily but took old dose. Does not know dose, uses AVS but AVS ran out last week so she is unsure after that --> states that aide followed avs for doses   []   [x]       S/S bleeding/bruising/swelling/SOB -denies   []   [x]       Blood in urine or stool - denies   []   [x]       Procedures scheduled in the future at this time  ---> none upcoming---> states that procedure is NOT scheduled yet and she is supposed to hear today when it will be and will call us, states has to hold 5 days, not sure about lovenox as pt is non-compliant/doesn't wish to do them    []   [x]       Missed Dose - denies   [x]   []       Extra Dose - had to get a new mediset, so might have had wrong dose in past week    []   [x]       Change in medications-using perc and tylenol prn for pain with new braces- states this will continue---> takes tylenol prn ---> cefpodoxil finished several days ago, had several tylenol --> no more tylenol, no other changes in meds --> no changes     [x]   []       Change in health/diet/appetite Normal vegetable is about twice a week and generally a low vitamin K. When she has broccoli it would be in a stir truong. May have a salad only once a month.---> had vegetables, no significant vit k ---> had greens twice since last visit.  States that she had done a detox with tea (haven and turmeric tea)  had greens 2-3 times in the past week, no NVD --> had 3 servings vit k in the last week---> has more vit k this week than previous (asparagus, spinach, broccoli, zucchini) --> no changes, greens 2-3 times, no NVD --> no vomiting. No greens d/t being ill. ---> states went back to [facility], has a bunch ready for tonight for [facility] [place], no NVD --> had greens 3x this week --> had greens a few times, no V/D  [x]   []       Change in alcohol use adamant that she did not drink alcohol while in [place] --> states now that she had one glass of alcohol in [facility]. none recently --> denies---> had a large mixed drink in last week   []   [x]       Change in activity  []   [x]       Hospital admission   []   [x]       Emergency department visit-  []   [x]       Other complaints   Clinical Outcomes:  Yes     No  []   [x]       Major bleeding event  []   [x]       Thromboembolic event    Duration of warfarin therapy: Indefinitely  INR Range:  2.0-3.0     Continues to miss apts and miss doses. Pt rarely offers up any information to determine cause of INR fluctuations. INR is 5.7 today    Unclear etiology for INR continuing to significantly fluctuate. But unable to verify doses pt is receiving at home   Hold dose tonight and tomorrow then decrease weekly dose to 5 mg daily (3.5% dec)  Encouraged to maintain a consistency of vegetables/salads.    Recheck INR in 1 week, , d/t pt needing certain time for appt     **consent form signed 10/27/2021    Referring PCP is Dr. [place] results  INR (no units)   Date Value   2022 3.2   2022 4.1   2022 6.1   2022 4.7   10/05/2020 8.93 (HH)   2020 3.22 (H)   2020 2.16 (H)   2020 3.55 (H)       For Pharmacy Admin Tracking Only     Intervention Detail: Adherence Monitorin and Dose Adjustment: 1, reason: Therapy Optimization   Total # of Interventions Recommended: 2   Total # of Interventions Accepted: 2   Time Spent (min): 15

## 2022-04-18 ENCOUNTER — TELEPHONE (OUTPATIENT)
Dept: PHARMACY | Age: 42
End: 2022-04-18

## 2022-04-21 ENCOUNTER — TELEPHONE (OUTPATIENT)
Dept: PHARMACY | Age: 42
End: 2022-04-21

## 2022-04-25 ENCOUNTER — ANTI-COAG VISIT (OUTPATIENT)
Dept: PHARMACY | Age: 42
End: 2022-04-25
Payer: MEDICAID

## 2022-04-25 DIAGNOSIS — I26.99 PULMONARY EMBOLISM, UNSPECIFIED CHRONICITY, UNSPECIFIED PULMONARY EMBOLISM TYPE, UNSPECIFIED WHETHER ACUTE COR PULMONALE PRESENT (HCC): ICD-10-CM

## 2022-04-25 DIAGNOSIS — Z79.01 LONG TERM (CURRENT) USE OF ANTICOAGULANTS: ICD-10-CM

## 2022-04-25 DIAGNOSIS — Z29.9 DVT PROPHYLAXIS: Primary | ICD-10-CM

## 2022-04-25 LAB — INTERNATIONAL NORMALIZATION RATIO, POC: 7.3

## 2022-04-25 PROCEDURE — 99212 OFFICE O/P EST SF 10 MIN: CPT

## 2022-04-25 PROCEDURE — 85610 PROTHROMBIN TIME: CPT

## 2022-04-25 NOTE — PROGRESS NOTES
Ms. Kelly Palmer is a 46 y.o. y/o female with history of DVT, PE in about 2010. She presents today for anticoagulation monitoring and adjustment. Pertinent PMH: DVT assoc with PICC line  Hx of multiple sclerosis,     Patient Reported Findings:  Yes     No  []   [x]       Patient verifies current dosing regimen as listed---> uses AVS --> confirms dose --> does not know dose, but takes what RN gives her but then states that she might have taken 5 mg daily but took old dose. Does not know dose, uses AVS but AVS ran out last week so she is unsure after that --> states that aide followed avs for doses --> spoke with prasanth HEWITT, 2.5 on Sun, Tues and Thurs and 5 mg all other days of the week   []   [x]       S/S bleeding/bruising/swelling/SOB -denies   []   [x]       Blood in urine or stool - denies   []   [x]       Procedures scheduled in the future at this time  ---> none upcoming   []   [x]       Missed Dose - denies   []   [x]       Extra Dose - denies  []   [x]       Change in medications-using perc and tylenol prn for pain with new braces- states this will continue---> takes tylenol prn ---> cefpodoxil finished several days ago, had several tylenol --> no more tylenol, no other changes in meds --> no changes     [x]   []       Change in health/diet/appetite Normal vegetable is about twice a week and generally a low vitamin K. When she has broccoli it would be in a stir truong. May have a salad only once a month.---> had vegetables, no significant vit k ---> had greens twice since last visit. States that she had done a detox with tea (haven and turmeric tea)  had greens 2-3 times in the past week, no NVD --> had 3 servings vit k in the last week---> has more vit k this week than previous (asparagus, spinach, broccoli, zucchini) --> no changes, greens 2-3 times, no NVD --> no vomiting.  No greens d/t being ill. ---> [place], has a bunch ready for tonight for [facility] [place], no NVD --> had greens 3x this week --> had greens a few times, no V/D --> tried to have more vit k to help INR   [x]   []       Change in alcohol use adamant that she did not drink alcohol while in [place] --> states now that she had one glass of alcohol in [facility]. none recently --> denies---> had a large mixed drink in last week   []   [x]       Change in activity  []   [x]       Hospital admission   []   [x]       Emergency department visit-  []   [x]       Other complaints   Clinical Outcomes:  Yes     No  []   [x]       Major bleeding event  []   [x]       Thromboembolic event    Duration of warfarin therapy: Indefinitely  INR Range:  2.0-3.0     Continues to miss apts and miss doses. Pt rarely offers up any information to determine cause of INR fluctuations. Spoke with Jerry Baumann RN that fills medipack to determine dose pt has been receiving. INR is 7.3 today   Pt has been receiving significantly higher weekly dose (10% higher than instructed)    Hold dose tonight and tomorrow then decrease weekly dose to 3.75 mg on Sun and Th and 5 mg all other days of the week (16% dec)  Encouraged to maintain a consistency of vegetables/salads.    Recheck INR in 1 week,     **consent form signed 10/27/2021    Referring PCP is Dr. Murry Spatz results  INR (no units)   Date Value   2022 5.7   2022 3.2   2022 4.1   2022 6.1   10/05/2020 8.93 (HH)   2020 3.22 (H)   2020 2.16 (H)   2020 3.55 (H)       For Pharmacy Admin Tracking Only     Intervention Detail: Adherence Monitorin and Dose Adjustment: 1, reason: Therapy Optimization   Total # of Interventions Recommended: 2   Total # of Interventions Accepted: 2   Time Spent (min): 20

## 2022-04-28 NOTE — TELEPHONE ENCOUNTER
Warfarin prescription phoned in and LM on VM to [facility], [address] [place] [address], [phone] under Dr. Nadya Myrick ([phone])  Warfarin 2.5 mg tabs  Take 3.75 mg on Sun and Thurs and 5 mg all other days of the week  90 days   2 refills

## 2022-05-02 ENCOUNTER — TELEPHONE (OUTPATIENT)
Dept: PHARMACY | Age: 42
End: 2022-05-02

## 2022-05-02 NOTE — TELEPHONE ENCOUNTER
Patient called and rescheduled for 5/3. She has a wound care appt on 5/3 and wanted to coordinate appts to save a trip.

## 2022-05-03 ENCOUNTER — TELEPHONE (OUTPATIENT)
Dept: PHARMACY | Age: 42
End: 2022-05-03

## 2022-05-03 ENCOUNTER — HOSPITAL ENCOUNTER (OUTPATIENT)
Dept: WOUND CARE | Age: 42
Discharge: HOME | End: 2022-05-03
Payer: MEDICAID

## 2022-05-03 ENCOUNTER — ANTI-COAG VISIT (OUTPATIENT)
Dept: PHARMACY | Age: 42
End: 2022-05-03
Payer: MEDICAID

## 2022-05-03 VITALS
BODY MASS INDEX: 17.08 KG/M2 | WEIGHT: 93.4 LBS | RESPIRATION RATE: 15 BRPM | HEART RATE: 102 BPM | DIASTOLIC BLOOD PRESSURE: 60 MMHG | TEMPERATURE: 96.9 F | SYSTOLIC BLOOD PRESSURE: 85 MMHG

## 2022-05-03 DIAGNOSIS — Z79.01 LONG TERM (CURRENT) USE OF ANTICOAGULANTS: ICD-10-CM

## 2022-05-03 DIAGNOSIS — I26.09 PULMONARY EMBOLISM WITH ACUTE COR PULMONALE, UNSPECIFIED CHRONICITY, UNSPECIFIED PULMONARY EMBOLISM TYPE (HCC): ICD-10-CM

## 2022-05-03 DIAGNOSIS — Z29.9 DVT PROPHYLAXIS: Primary | ICD-10-CM

## 2022-05-03 LAB — INTERNATIONAL NORMALIZATION RATIO, POC: 7.9

## 2022-05-03 PROCEDURE — 99214 OFFICE O/P EST MOD 30 MIN: CPT

## 2022-05-03 PROCEDURE — 11042 DBRDMT SUBQ TIS 1ST 20SQCM/<: CPT

## 2022-05-03 PROCEDURE — 85610 PROTHROMBIN TIME: CPT

## 2022-05-03 PROCEDURE — 11042 DBRDMT SUBQ TIS 1ST 20SQCM/<: CPT | Performed by: SURGERY

## 2022-05-03 PROCEDURE — 99212 OFFICE O/P EST SF 10 MIN: CPT

## 2022-05-03 NOTE — PROGRESS NOTES
[address] [facility],[address] Floor:      [address] f: [phone] f: [phone] p: [phone] [email or web address]     Ordering Center: [facility] [address]  [address] [address]  [phone]  Dept: [phone]   Fax# [phone]    Patient Information:      Alixsandi  [address] [facility] [address] [place] [address]   [phone]   : 1980  AGE: 46 y.o. GENDER: female   TODAYS DATE:  5/3/2022    Insurance:      PRIMARY INSURANCE:  Plan: MEDICAID [address] [place] [address] DEPT OF JOB  Coverage: [place]  Effective Date: 2015  Group Number: [unfilled]  Subscriber Number: [id number] - (Medicaid)    Payor/Plan Subscr  Sex Relation Sub. Ins. ID Effective Group Num   1.  [place] -* DEMARCO SCHWARZ 1980 Female Self [id number] 1/1/15                                    [address]         Patient Wound Information:     Additional ICD-10 Codes:     Patient Active Problem List   Diagnosis Code    DVT prophylaxis Z29.9    Pulmonary emboli (HCC) I26.99    Long term (current) use of anticoagulants Z79.01       WOUNDS REQUIRING DRESSING SUPPLIES:     Wound 22 Ischium Right #1 (Active)   Wound Image   22 1101   Wound Etiology Pressure Stage 2 22 1101   Wound Cleansed Cleansed with saline 22 1101   Wound Length (cm) 1.6 cm 22 1101   Wound Width (cm) 5.2 cm 22 1101   Wound Depth (cm) 0.1 cm 22 1101   Wound Surface Area (cm^2) 8.32 cm^2 22 1101   Wound Volume (cm^3) 0.832 cm^3 22 1101   Wound Assessment Granulation tissue 22 1101   Drainage Amount Small 22 1101   Drainage Description Serosanguinous 22 1101   Odor None 22 1101   Merlyn-wound Assessment Dry/flaky 22 1101   Margins Attached edges 22 1101   Number of days: 0       Wound 22 Sacrum #2 (Active)   Wound Image   22 1102   Wound Etiology Pressure Stage 3 22 1102   Wound Cleansed Cleansed with saline 05/03/22 1102   Wound Length (cm) 0.7 cm 05/03/22 1102   Wound Width (cm) 1.7 cm 05/03/22 1102   Wound Depth (cm) 0.4 cm 05/03/22 1102   Wound Surface Area (cm^2) 1.19 cm^2 05/03/22 1102   Wound Volume (cm^3) 0.476 cm^3 05/03/22 1102   Wound Assessment Slough 05/03/22 1102   Drainage Amount Moderate 05/03/22 1102   Drainage Description Serosanguinous 05/03/22 1102   Odor None 05/03/22 1102   Merlyn-wound Assessment Excoriated 05/03/22 1102   Margins Defined edges 05/03/22 1102   Number of days: 0       Wound 05/03/22 Ischium Left #3 (Active)   Wound Image   05/03/22 1105   Wound Etiology Pressure Stage 2 05/03/22 1105   Wound Cleansed Cleansed with saline 05/03/22 1105   Wound Length (cm) 6.2 cm 05/03/22 1105   Wound Width (cm) 6.5 cm 05/03/22 1105   Wound Depth (cm) 0.4 cm 05/03/22 1105   Wound Surface Area (cm^2) 40.3 cm^2 05/03/22 1105   Wound Volume (cm^3) 16.12 cm^3 05/03/22 1105   Wound Assessment Granulation tissue 05/03/22 1105   Drainage Amount Small 05/03/22 1105   Drainage Description Serosanguinous 05/03/22 1105   Odor None 05/03/22 1105   Merlyn-wound Assessment Dry/flaky 05/03/22 1105   Margins Defined edges 05/03/22 1105   Number of days: 0          Supplies Requested :      WOUND #: 1 and 3   PRIMARY DRESSING:    None   Cover and Secure with:  Other Kerramax Gentle Border (size to fit wound)     FREQUENCY OF DRESSING CHANGES:  Every other day    Wound Thickness [x] Full   []Partial       WOUND #: 2   PRIMARY DRESSING:    Other: Stefany Pluck and Secure with: 4X4 gauze pad  Other Kerramax Gentle Border (size to fit wound)     FREQUENCY OF DRESSING CHANGES:  Every other day    Wound Thickness [x] Full   []Partial        Patient Wound(s) Debrided: [x] Yes   [] No     Debridement Date: 5/3/2022    Debribement Type: Excisional/Sharp #2 Mechanical # 1 & 3    ADDITIONAL ITEMS:  [] Gloves Small  [x] Gloves Medium [] Gloves Large [] Gloves Tanvi Green  [] Paper Tape 1\" [] Paper Tape 2\" [] Paper Tape 3\"  [] Medipore Tape 3\"  [x] Saline  [x] Skin Prep   [] Adhesive Remover   [x] Cotton Tip Applicators  [] Tubular Stocking   [] Size E  [] Size G  [] Other:    Patient currently being seen by Home Health: [] Yes   [x] No    Duration for needed supplies:  [x]15  []30  []60  []90 Days    Provider Information:      PROVIDER'S NAME/NPI  Christo Rosa MD NPI: [id number]   I give permission to coordinate the care for this patient

## 2022-05-03 NOTE — PROGRESS NOTES
Ms. Rimma Hubbard is a 46 y.o. y/o female with history of DVT, PE in about 2010. She presents today for anticoagulation monitoring and adjustment. Pertinent PMH: DVT assoc with PICC line  Hx of multiple sclerosis,     Patient Reported Findings:  Yes     No  []   [x]       Patient verifies current dosing regimen as listed---> uses AVS --> confirms dose --> does not know dose, but takes what RN gives her but then states that she might have taken 5 mg daily but took old dose. Does not know dose, uses AVS but AVS ran out last week so she is unsure after that --> states that aide followed avs for doses --> spoke with prasanth HEWITT, 2.5 on Sun, Tues and Thurs and 5 mg all other days of the week---> need to call Mounika Tan    []   [x]       S/S bleeding/bruising/swelling/SOB -denies   []   [x]       Blood in urine or stool - denies   []   [x]       Procedures scheduled in the future at this time  ---> none upcoming   []   [x]       Missed Dose - denies   []   [x]       Extra Dose - denies  []   [x]       Change in medications-using perc and tylenol prn for pain with new braces- states this will continue---> takes tylenol prn ---> cefpodoxil finished several days ago, had several tylenol --> no more tylenol, no other changes in meds --> no changes---> taking a lot of tylenol recently     [x]   []       Change in health/diet/appetite Normal vegetable is about twice a week and generally a low vitamin K. When she has broccoli it would be in a stir truong. May have a salad only once a month.---> had vegetables, no significant vit k ---> had greens twice since last visit. States that she had done a detox with tea (haven and turmeric tea)  had greens 2-3 times in the past week, no NVD --> had 3 servings vit k in the last week---> has more vit k this week than previous (asparagus, spinach, broccoli, zucchini) --> no changes, greens 2-3 times, no NVD --> no vomiting.  No greens d/t being ill. ---> states went back to greens, has a bunch ready for tonight for [facility] [employer] [employer], no NVD --> had greens 3x this week --> had greens a few times, no V/D --> tried to have more vit k to help INR ---> states had more greens, diarrhea last week, no NV  [x]   []       Change in alcohol use adamant that she did not drink alcohol while in [place] --> states now that she had one glass of alcohol in [facility]. none recently --> denies---> had a large mixed drink in last week---> denies    []   [x]       Change in activity  []   [x]       Hospital admission   []   [x]       Emergency department visit-  []   [x]       Other complaints   Clinical Outcomes:  Yes     No  []   [x]       Major bleeding event  []   [x]       Thromboembolic event    Duration of warfarin therapy: Indefinitely  INR Range:  2.0-3.0     Continues to miss apts and miss doses. Pt rarely offers up any information to determine cause of INR fluctuations. Kendall Napier, RN fills [facility]. Will need to call and clarify what is going on---> pt could not remember number and didn't have phone, will call me when she gets home to let me know what Cynthia's number is and will need to discuss this as there is no way she followed AVS from last visit. INR is 7.9 today   Dose decreased at last visit after INR of 7.3. She was receiving a higher dose than directed and this was discussed with RN. Patient did not have RN's number to discuss today, she will call when she gets home so that I can ask what pt has been given in past week as it is likely she has been overdosed again. Hold for the next 3 days then take 3.75mg daily until return to clinic  Encouraged to maintain a consistency of vegetables/salads.    Recheck INR in 1 week, 5/9    **consent form signed 10/27/2021    Referring PCP is Dr. Jose A De La Cruz results  INR (no units)   Date Value   05/03/2022 7.9   04/25/2022 7.3   04/07/2022 5.7   03/24/2022 3.2   10/05/2020 8.93 (HH)   09/19/2020 3.22 (H)   07/17/2020 2.16 (H)   06/11/2020 3.55 (H)       For Pharmacy Admin Tracking Only     Intervention Detail: Adherence Monitorin and Dose Adjustment: 1, reason: Therapy Optimization   Total # of Interventions Recommended: 2   Total # of Interventions Accepted: 2   Time Spent (min): 20

## 2022-05-03 NOTE — TELEPHONE ENCOUNTER
Called patient back and got Cynthia's number: [phone]. Left Cynthia several VMs. Cynthia called back and said she followed the dosing exactly as directed from paperwork. States that she thinks the pt is taking it wrong- like taking it sometimes in the morning and then sometimes at night. Marcelo Gillespie states that there was nothing on the AVS for yesterday since she was supposed to be here so she doesn't think she gave her anything. Asked if she got the new tablets that were called in or if she is using old tablets. States not sure if she picked up the new one but using 2.5mg tablets. Confirmed they were green. Rn will double check today when she goes out and look to see if there are any other warfarin tablets that she has and are accidentally getting filled as well. Asked pt to bring in pillbox and bottles to apt on Monday so we can discuss since unclear what is causing INR to remain elevated if following AVS.     Pt will stick with 8pm to take her meds.      Particia Favorite, PharmD, [facility]

## 2022-05-03 NOTE — TELEPHONE ENCOUNTER
Called to ask for Cynthia's number since pt never called me with it after elevated INR today. States that Kevin Newton will be coming tonight between 4-6pm to fix her box and will conference call me then so we can review what is in the box now, and what she needs to be taking.     Billie Adler, PharmD, [facility]

## 2022-05-03 NOTE — PLAN OF CARE
Discharge instructions given. Patient verbalized understanding. Return to [facility] in 1 week(s).   Called/faxed orders to [facility]

## 2022-05-09 ENCOUNTER — ANTI-COAG VISIT (OUTPATIENT)
Dept: PHARMACY | Age: 42
End: 2022-05-09
Payer: MEDICAID

## 2022-05-09 DIAGNOSIS — I26.99 PULMONARY EMBOLISM, UNSPECIFIED CHRONICITY, UNSPECIFIED PULMONARY EMBOLISM TYPE, UNSPECIFIED WHETHER ACUTE COR PULMONALE PRESENT (HCC): ICD-10-CM

## 2022-05-09 DIAGNOSIS — Z79.01 LONG TERM (CURRENT) USE OF ANTICOAGULANTS: ICD-10-CM

## 2022-05-09 DIAGNOSIS — Z29.9 DVT PROPHYLAXIS: Primary | ICD-10-CM

## 2022-05-09 LAB — INTERNATIONAL NORMALIZATION RATIO, POC: 5

## 2022-05-09 PROCEDURE — 99212 OFFICE O/P EST SF 10 MIN: CPT

## 2022-05-09 PROCEDURE — 85610 PROTHROMBIN TIME: CPT

## 2022-05-09 NOTE — PROGRESS NOTES
Ms. Edna Feldman is a 46 y.o. y/o female with history of DVT, PE in about 2010. She presents today for anticoagulation monitoring and adjustment. Pertinent PMH: DVT assoc with PICC line  Hx of multiple sclerosis,     Patient Reported Findings:  Yes     No  []   [x]       Patient verifies current dosing regimen as listed---> uses AVS --> confirms dose --> does not know dose, but takes what RN gives her but then states that she might have taken 5 mg daily but took old dose. Does not know dose, uses AVS but AVS ran out last week so she is unsure after that --> states that aide followed avs for doses --> spoke with prasanth HEWITT, 2.5 on Sun, Tues and Thurs and 5 mg all other days of the week---> need to call Jerry Baumann     []   [x]       S/S bleeding/bruising/swelling/SOB -denies   []   [x]       Blood in urine or stool - denies   []   [x]       Procedures scheduled in the future at this time  ---> none upcoming   [x]   []       Missed Dose - last night since nervous about INR   []   [x]       Extra Dose - denies  [x]   []       Change in medications-using perc and tylenol prn for pain with new braces- states this will continue---> takes tylenol prn ---> cefpodoxil finished several days ago, had several tylenol --> no more tylenol, no other changes in meds -->has not been taking sea rivero for a while, unclear when   [x]   []       Change in health/diet/appetite Normal vegetable is about twice a week and generally a low vitamin K. When she has broccoli it would be in a stir truong. May have a salad only once a month.---> had vegetables, no significant vit k ---> had greens twice since last visit. States that she had done a detox with tea (haven and turmeric tea)  had greens 2-3 times in the past week, no NVD --> no changes, greens 2-3 times, no NVD --> no vomiting.  No greens d/t being ill. ---> had greens 3x this week --> had greens a few times, no V/D --> tried to have more vit k to help INR ---> states had more greens, diarrhea last week, no NV --> had green beans this weekend. Plans to have greens this week   []   [x]       Change in alcohol use adamant that she did not drink alcohol while in [place] --> states now that she had one glass of alcohol in [facility]. none recently --> denies---> had a large mixed drink in last week---> denies    []   [x]       Change in activity  []   [x]       Hospital admission   []   [x]       Emergency department visit-  []   [x]       Other complaints   Clinical Outcomes:  Yes     No  []   [x]       Major bleeding event  []   [x]       Thromboembolic event    Duration of warfarin therapy: Indefinitely  INR Range:  2.0-3.0     Continues to miss apts and miss doses. Pt rarely offers up any information to determine cause of INR fluctuations. Farrah Walton, RN fills [facility]. Will need to call and clarify what is going on---> pt could not remember number and didn't have phone, will call me when she gets home to let me know what Cynthia's number is and will need to discuss this as there is no way she followed AVS from last visit. Presents with pillbox and warfarin tablets, warfarin is out of pillbox, none in box. Verified does have warfarin 2.5 mg tablets     INR is 5 today despite having 3 doses in past week   Hold for the next 2 days then take 2.5mg daily until return to clinic. Reviewed dosing with patient multiple times and asked to relay information to Farrah Walton regarding that med box is filled for the next week   Filled pillbox for warfarin for the next week   Encouraged to maintain a consistency of vegetables/salads.    Recheck INR in 1 week,     **consent form signed 10/27/2021    Referring PCP is Dr. David Owens results  INR (no units)   Date Value   2022 7.9   2022 7.3   2022 5.7   2022 3.2   10/05/2020 8.93 (HH)   2020 3.22 (H)   2020 2.16 (H)   2020 3.55 (H)       For Pharmacy Admin Tracking Only     Intervention Detail: Adherence Monitorin and Dose Adjustment: 1, reason: Therapy Optimization   Total # of Interventions Recommended: 2   Total # of Interventions Accepted: 2   Time Spent (min): 20

## 2022-05-12 ENCOUNTER — TELEPHONE (OUTPATIENT)
Dept: PHARMACY | Age: 42
End: 2022-05-12

## 2022-05-12 NOTE — TELEPHONE ENCOUNTER
Pt called because she was in the ED today at [facility]. INR was 3. Was d/c on cefpodoxime, no interaction with warfarin. Advised to continue dose as instructed at last visit. Will assess INR when RTC on 5/17.

## 2022-05-16 NOTE — TELEPHONE ENCOUNTER
Patient LVM stating she was discharged from [facility] on Saturday evening with an INR of 2.9.  Please call with warfarin dosing and schedule next INR in clinic. [phone]. Would like pt to keep apt tomorrow as INR has been drastically fluctuating.     Estrella Sena, LukaszD, [facility]

## 2022-05-17 ENCOUNTER — ANTI-COAG VISIT (OUTPATIENT)
Dept: PHARMACY | Age: 42
End: 2022-05-17
Payer: MEDICAID

## 2022-05-17 ENCOUNTER — HOSPITAL ENCOUNTER (OUTPATIENT)
Dept: WOUND CARE | Age: 42
Discharge: HOME | End: 2022-05-17
Payer: MEDICAID

## 2022-05-17 VITALS — BODY MASS INDEX: 18.64 KG/M2 | WEIGHT: 101.9 LBS

## 2022-05-17 DIAGNOSIS — Z79.01 LONG TERM (CURRENT) USE OF ANTICOAGULANTS: ICD-10-CM

## 2022-05-17 DIAGNOSIS — Z29.9 DVT PROPHYLAXIS: Primary | ICD-10-CM

## 2022-05-17 DIAGNOSIS — L89.154 SACRAL DECUBITUS ULCER, STAGE IV (HCC): Primary | ICD-10-CM

## 2022-05-17 DIAGNOSIS — I26.99 PULMONARY EMBOLISM, UNSPECIFIED CHRONICITY, UNSPECIFIED PULMONARY EMBOLISM TYPE, UNSPECIFIED WHETHER ACUTE COR PULMONALE PRESENT (HCC): ICD-10-CM

## 2022-05-17 LAB — INTERNATIONAL NORMALIZATION RATIO, POC: 2.5

## 2022-05-17 PROCEDURE — 11042 DBRDMT SUBQ TIS 1ST 20SQCM/<: CPT

## 2022-05-17 PROCEDURE — 11042 DBRDMT SUBQ TIS 1ST 20SQCM/<: CPT | Performed by: SURGERY

## 2022-05-17 PROCEDURE — 99212 OFFICE O/P EST SF 10 MIN: CPT

## 2022-05-17 PROCEDURE — 85610 PROTHROMBIN TIME: CPT

## 2022-05-17 RX ORDER — LIDOCAINE 40 MG/G
CREAM TOPICAL ONCE
Status: DISCONTINUED | OUTPATIENT
Start: 2022-05-17 | End: 2022-05-18 | Stop reason: HOSPADM

## 2022-05-17 RX ORDER — GENTAMICIN SULFATE 1 MG/G
OINTMENT TOPICAL ONCE
Status: CANCELLED | OUTPATIENT
Start: 2022-05-17 | End: 2022-05-17

## 2022-05-17 RX ORDER — CLOBETASOL PROPIONATE 0.5 MG/G
OINTMENT TOPICAL ONCE
Status: CANCELLED | OUTPATIENT
Start: 2022-05-17 | End: 2022-05-17

## 2022-05-17 RX ORDER — LIDOCAINE HYDROCHLORIDE 20 MG/ML
JELLY TOPICAL ONCE
Status: CANCELLED | OUTPATIENT
Start: 2022-05-17 | End: 2022-05-17

## 2022-05-17 RX ORDER — BACITRACIN ZINC AND POLYMYXIN B SULFATE 500; 1000 [USP'U]/G; [USP'U]/G
OINTMENT TOPICAL ONCE
Status: CANCELLED | OUTPATIENT
Start: 2022-05-17 | End: 2022-05-17

## 2022-05-17 RX ORDER — GINSENG 100 MG
CAPSULE ORAL ONCE
Status: CANCELLED | OUTPATIENT
Start: 2022-05-17 | End: 2022-05-17

## 2022-05-17 RX ORDER — LIDOCAINE 40 MG/G
CREAM TOPICAL ONCE
Status: CANCELLED | OUTPATIENT
Start: 2022-05-17 | End: 2022-05-17

## 2022-05-17 RX ORDER — LIDOCAINE 50 MG/G
OINTMENT TOPICAL ONCE
Status: CANCELLED | OUTPATIENT
Start: 2022-05-17 | End: 2022-05-17

## 2022-05-17 RX ORDER — BETAMETHASONE DIPROPIONATE 0.05 %
OINTMENT (GRAM) TOPICAL ONCE
Status: CANCELLED | OUTPATIENT
Start: 2022-05-17 | End: 2022-05-17

## 2022-05-17 RX ORDER — LIDOCAINE HYDROCHLORIDE 40 MG/ML
SOLUTION TOPICAL ONCE
Status: CANCELLED | OUTPATIENT
Start: 2022-05-17 | End: 2022-05-17

## 2022-05-17 RX ORDER — BACITRACIN, NEOMYCIN, POLYMYXIN B 400; 3.5; 5 [USP'U]/G; MG/G; [USP'U]/G
OINTMENT TOPICAL ONCE
Status: CANCELLED | OUTPATIENT
Start: 2022-05-17 | End: 2022-05-17

## 2022-05-17 NOTE — PLAN OF CARE
Discharge instructions given. Patient verbalized understanding. Return to [address] [place] [address],[address] in 1 week(s).

## 2022-05-17 NOTE — PROGRESS NOTES
Mildred Mena  AGE: 46 y.o. GENDER: female  : 1980  TODAY'S DATE:  2022    Chief Complaint   Patient presents with    Wound Check     MULTIPLE PRESSURE WOUNDS        HISTORY of PRESENT ILLNESS HPI     Mildred Mena is a 46 y.o. female who presents today for wound evaluation. History of Wound: Bilateral ischial ulcers and sacral ulcer  Wound Pain:  moderate  Severity:  4 / 10   Wound Type:  pressure  Modifying Factors:  chronic pressure  Associated Signs/Symptoms:  none    Procedure Note    Performed by: Yesenia Luis MD    Consent obtained: Yes    Time out taken:  Yes    Pain Control:       Debridement:Excisional Debridement    Using curette the wound was sharply debrided    down through and including the removal of subcutaneous tissue. Devitalized Tissue Debrided:  necrotic/eschar    Pre Debridement Measurements:  Are located in the Wound Documentation Flow Sheet    Wound #: 2 and 3     Post  Debridement Measurements:  Wound 22 Ischium Right #1 (Active)   Wound Image   22 1101   Wound Etiology Pressure Stage 3 22 1032   Wound Cleansed Cleansed with saline 22 1032   Wound Length (cm) 1.8 cm 22 1032   Wound Width (cm) 0.5 cm 22 1032   Wound Depth (cm) 0.1 cm 22 1032   Wound Surface Area (cm^2) 0.9 cm^2 22 1032   Change in Wound Size % (l*w) 89.18 22 1032   Wound Volume (cm^3) 0.09 cm^3 22 1032   Wound Healing % 89 22 1032   Wound Assessment Dry;Granulation tissue 22 1032   Drainage Amount None 22 1032   Drainage Description Serosanguinous 22 1101   Odor None 22 1032   Merlyn-wound Assessment Intact 22 1032   Margins Defined edges; Attached edges 22 1032   Number of days: 14       Wound 22 Sacrum #2 (Active)   Wound Image   22 1102   Wound Etiology Pressure Stage 3 22 1032   Wound Cleansed Cleansed with saline 22 1048   Wound Length (cm) 1 cm 22 1032 Wound Width (cm) 1 cm 05/17/22 1032   Wound Depth (cm) 0.2 cm 05/17/22 1032   Wound Surface Area (cm^2) 1 cm^2 05/17/22 1032   Change in Wound Size % (l*w) 15.97 05/17/22 1032   Wound Volume (cm^3) 0.2 cm^3 05/17/22 1032   Wound Healing % 58 05/17/22 1032   Post-Procedure Length (cm) 1 cm 05/17/22 1048   Post-Procedure Width (cm) 1 cm 05/17/22 1048   Post-Procedure Depth (cm) 0.2 cm 05/17/22 1048   Post-Procedure Surface Area (cm^2) 1 cm^2 05/17/22 1048   Post-Procedure Volume (cm^3) 0.2 cm^3 05/17/22 1048   Wound Assessment Bleeding 05/17/22 1048   Drainage Amount Moderate 05/17/22 1048   Drainage Description Serosanguinous 05/17/22 1032   Odor None 05/17/22 1032   Merlyn-wound Assessment Intact 05/17/22 1032   Margins Defined edges; Attached edges 05/17/22 1032   Number of days: 14       Wound 05/03/22 Ischium Left #3 (Active)   Wound Image   05/03/22 1105   Wound Etiology Pressure Stage 3 05/17/22 1032   Wound Cleansed Cleansed with saline 05/17/22 1048   Wound Length (cm) 3.5 cm 05/17/22 1032   Wound Width (cm) 4 cm 05/17/22 1032   Wound Depth (cm) 1.5 cm 05/17/22 1032   Wound Surface Area (cm^2) 14 cm^2 05/17/22 1032   Change in Wound Size % (l*w) 65.26 05/17/22 1032   Wound Volume (cm^3) 21 cm^3 05/17/22 1032   Wound Healing % -30 05/17/22 1032   Post-Procedure Length (cm) 3.5 cm 05/17/22 1048   Post-Procedure Width (cm) 4 cm 05/17/22 1048   Post-Procedure Depth (cm) 1.8 cm 05/17/22 1048   Post-Procedure Surface Area (cm^2) 14 cm^2 05/17/22 1048   Post-Procedure Volume (cm^3) 25.2 cm^3 05/17/22 1048   Wound Assessment Bleeding 05/17/22 1048   Drainage Amount Moderate 05/17/22 1048   Drainage Description Serosanguinous 05/17/22 1032   Odor None 05/17/22 1032   Merlyn-wound Assessment Intact 05/17/22 1032   Margins Defined edges 05/17/22 1032   Number of days: 14           Total Surface Area Debrided:  15 sq cm     Bleeding:  Minimal    Hemostasis Achieved:  by pressure and sutures    Procedural Pain:  3  / 10 Post Procedural Pain:  0 / 10     Response to treatment:  Well tolerated by patient. The nature of the patient's condition was explained in depth. The patient was informed that their compliance to the treatment plan is paramount to successful healing and prevention of further ulceration and/or infection     Treatment Plan:   42-year-old female with multiple sclerosis who is seen in follow-up for bilateral ischial and sacral ulcers. The right ischial ulcer and sacral ulcers have improved. She has new pressure damage to the central portion of the left ischial ulcer. The sacral ulcer and left ischial ulcers were debrided. We will use collagen dressings at both of these sites. We will place a dry bandage over the less extensive and more superficial right ischial ulcers.   Avoid pressure to all areas follow-up in the wound center in 1 week    Demetrice Elizondo MD, M.D.  5/17/2022

## 2022-05-17 NOTE — PROGRESS NOTES
Edna Feldman  AGE: 46 y.o. GENDER: female  : 1980  TODAY'S DATE:  5/3/2022    Chief Complaint   Patient presents with    Wound Check     Wounds from [facility] for a month        HISTORY of PRESENT ILLNESS HPI     Edna Feldman is a 46 y.o. female who presents today for wound evaluation. History of Wound: biateral ischial and sacral ulcer  Wound Pain:  moderate  Severity:  4 / 10   Wound Type:  pressure  Modifying Factors:  none  Associated Signs/Symptoms:  none    Procedure Note    Performed by: Maico Niño MD    Consent obtained: Yes    Time out taken:  Yes    Pain Control:       Debridement:Excisional Debridement    Using curette the wound was sharply debrided    down through and including the removal of subcutaneous tissue.         Devitalized Tissue Debrided:  necrotic/eschar    Pre Debridement Measurements:  Are located in the Wound Documentation Flow Sheet    Wound #: 2     Post  Debridement Measurements:  Wound 22 Ischium Right #1 (Active)   Wound Image   22 1101   Wound Etiology Pressure Stage 2 22 1101   Wound Cleansed Cleansed with saline 22 1101   Wound Length (cm) 1.6 cm 22 1101   Wound Width (cm) 5.2 cm 22 1101   Wound Depth (cm) 0.1 cm 22 1101   Wound Surface Area (cm^2) 8.32 cm^2 22 1101   Wound Volume (cm^3) 0.832 cm^3 22 1101   Wound Assessment Granulation tissue 22 1101   Drainage Amount Small 22 1101   Drainage Description Serosanguinous 22 1101   Odor None 22 1101   Merlyn-wound Assessment Dry/flaky 22 1101   Margins Attached edges 22 1101   Number of days: 14       Wound 22 Sacrum #2 (Active)   Wound Image   22 1102   Wound Etiology Pressure Stage 3 22 1102   Wound Cleansed Cleansed with saline 22 1102   Wound Length (cm) 0.7 cm 22 1102   Wound Width (cm) 1.7 cm 22 1102   Wound Depth (cm) 0.4 cm 22 1102   Wound Surface Area (cm^2) 1.19 cm^2 05/03/22 1102   Wound Volume (cm^3) 0.476 cm^3 05/03/22 1102   Wound Assessment Slough 05/03/22 1102   Drainage Amount Moderate 05/03/22 1102   Drainage Description Serosanguinous 05/03/22 1102   Odor None 05/03/22 1102   Merlyn-wound Assessment Excoriated 05/03/22 1102   Margins Defined edges 05/03/22 1102   Number of days: 14       Wound 05/03/22 Ischium Left #3 (Active)   Wound Image   05/03/22 1105   Wound Etiology Pressure Stage 2 05/03/22 1105   Wound Cleansed Cleansed with saline 05/03/22 1105   Wound Length (cm) 6.2 cm 05/03/22 1105   Wound Width (cm) 6.5 cm 05/03/22 1105   Wound Depth (cm) 0.4 cm 05/03/22 1105   Wound Surface Area (cm^2) 40.3 cm^2 05/03/22 1105   Wound Volume (cm^3) 16.12 cm^3 05/03/22 1105   Wound Assessment Granulation tissue 05/03/22 1105   Drainage Amount Small 05/03/22 1105   Drainage Description Serosanguinous 05/03/22 1105   Odor None 05/03/22 1105   Merlyn-wound Assessment Dry/flaky 05/03/22 1105   Margins Defined edges 05/03/22 1105   Number of days: 14           Total Surface Area Debrided:  1.19 sq cm     Bleeding:  Minimal    Hemostasis Achieved:  by pressure    Procedural Pain:  2  / 10     Post Procedural Pain:  0 / 10     Response to treatment:  Well tolerated by patient. The nature of the patient's condition was explained in depth. The patient was informed that their compliance to the treatment plan is paramount to successful healing and prevention of further ulceration and/or infection     Treatment Plan:   68-year-old female who presents for evaluation of bilateral stage III ischial ulcers as well as a stage IV sacral ulcer. The ischial ulcers are clean. Sacral ulcer required debridement. We will use collagen on the sacral ulcer and border dressings on the bilateral ischial ulcers. Follow-up again in 1 week.     Cara Carroll MD, MRIP.  5/17/2022

## 2022-05-17 NOTE — PROGRESS NOTES
Ms. Daniela Mcginnis is a 46 y.o. y/o female with history of DVT, PE in about 2010. She presents today for anticoagulation monitoring and adjustment. Pertinent PMH: DVT assoc with PICC line  Hx of multiple sclerosis,     Patient Reported Findings:  Yes     No  []   [x]       Patient verifies current dosing regimen as listed---> uses AVS --> confirms dose --> does not know dose, but takes what RN gives her but then states that she might have taken 5 mg daily but took old dose. Does not know dose, uses AVS but AVS ran out last week so she is unsure after that --> states that aide followed avs for doses --> spoke with prasanth HEWITT, 2.5 on Sun, Tues and Thurs and 5 mg all other days of the week---> need to call Taylor Rodriges     []   [x]       S/S bleeding/bruising/swelling/SOB -denies   []   [x]       Blood in urine or stool - denies   []   [x]       Procedures scheduled in the future at this time  ---> none upcoming   []   [x]       Missed Dose -denies   []   [x]       Extra Dose - denies  []   [x]       Change in medications-using perc and tylenol prn for pain with new braces- states this will continue---> takes tylenol prn ---> cefpodoxil finished several days ago, had several tylenol --> no more tylenol, no other changes in meds -->has not been taking sea rivero for a while, unclear when --> cefpodoxime from ED, no interaction   []   [x]       Change in health/diet/appetite Normal vegetable is about twice a week and generally a low vitamin K. When she has broccoli it would be in a stir truong. May have a salad only once a month.---> had vegetables, no significant vit k ---> had greens twice since last visit. States that she had done a detox with tea (haven and turmeric tea)  had greens 2-3 times in the past week, no NVD --> no changes, greens 2-3 times, no NVD --> no vomiting.  No greens d/t being ill. ---> had greens 3x this week --> had greens a few times, no V/D --> tried to have more vit k to help INR ---> states had more greens, diarrhea last week, no NV --> had green beans this weekend. Plans to have greens this week --> no changes   []   [x]       Change in alcohol use adamant that she did not drink alcohol while in [place] --> states now that she had one glass of alcohol in [facility]. none recently --> denies---> had a large mixed drink in last week---> denies    []   [x]       Change in activity  []   [x]       Hospital admission   [x]   []       Emergency department visit- at [facility] - for UTI. INR 2. 9. was prescribed cefpodoxime. Was given warfarin 2.5 mg in hospital    []   [x]       Other complaints   Clinical Outcomes:  Yes     No  []   [x]       Major bleeding event  []   [x]       Thromboembolic event    Duration of warfarin therapy: Indefinitely  INR Range:  2.0-3.0     Continues to miss apts and miss doses. Pt rarely offers up any information to determine cause of INR fluctuations. Pipo Hudson RN fills [facility]. Will need to call and clarify what is going on---> pt could not remember number and didn't have phone, will call me when she gets home to let me know what Cynthia's number is and will need to discuss this as there is no way she followed AVS from last visit. Presents with pillbox and warfarin tablets, warfarin is out of pillbox, none in box. Verified does have warfarin 2.5 mg tablets     INR is 2.5 today    Take 3.75 mg on Tu and 2.5 mg all other days of the week   Encouraged to maintain a consistency of vegetables/salads.    Recheck INR in 10 days,     **consent form signed 10/27/2021    Referring PCP is Dr. Susan Adams results  INR (no units)   Date Value   2022 5   2022 7.9   2022 7.3   2022 5.7   10/05/2020 8.93 (HH)   2020 3.22 (H)   2020 2.16 (H)   2020 3.55 (H)       For Pharmacy Admin Tracking Only     Intervention Detail: Adherence Monitorin and Dose Adjustment: 1, reason: Therapy Optimization   Total # of Interventions Recommended: 2   Total # of Interventions Accepted: 2   Time Spent (min): 15

## 2022-05-24 ENCOUNTER — TELEPHONE (OUTPATIENT)
Dept: PHARMACY | Age: 42
End: 2022-05-24

## 2022-05-24 ENCOUNTER — HOSPITAL ENCOUNTER (OUTPATIENT)
Dept: WOUND CARE | Age: 42
Discharge: HOME | End: 2022-05-24

## 2022-05-24 NOTE — TELEPHONE ENCOUNTER
----- Message from Brian Ellison sent at 5/24/2022  3:14 PM EDT -----  Regarding: INR checked @ [facility]  Contact: patient  INR 1.4 yesterday.   Please call patient to verify when she needs to return to clinic. [phone]

## 2022-05-24 NOTE — TELEPHONE ENCOUNTER
Returned call to patient. States that she was in the ED today, was not prescribed any new medications. Verified INR 1.4 today in ED. Pt denies any missed doses or other med changes. States that she has been eating more green beans, but likely would not cause significant drop in inr. Explained will maintain appt for Fri and assess INR/make adjustments then.

## 2022-05-27 ENCOUNTER — TELEPHONE (OUTPATIENT)
Dept: PHARMACY | Age: 42
End: 2022-05-27

## 2022-05-27 NOTE — TELEPHONE ENCOUNTER
Patient LVM stating she needed to cancel her appt for today and asked for a call back to reschedule. Called patient back. She has wound care @ 9:45am however, this appt usually takes 2 hrs and we have no available appt to coordinate with this appt. Patient states she will call back on Tuesday to reschedule. She is trying to book a dentist appt and wants to do that before rescheduling with us. Per Frannie Velásquez, patient may continue last dosing schedule until she returns to the clinic.

## 2022-05-31 ENCOUNTER — HOSPITAL ENCOUNTER (OUTPATIENT)
Dept: WOUND CARE | Age: 42
Discharge: HOME | End: 2022-05-31
Payer: MEDICAID

## 2022-05-31 ENCOUNTER — TELEPHONE (OUTPATIENT)
Dept: PHARMACY | Age: 42
End: 2022-05-31

## 2022-05-31 VITALS
DIASTOLIC BLOOD PRESSURE: 67 MMHG | HEART RATE: 109 BPM | HEIGHT: 62 IN | BODY MASS INDEX: 18.07 KG/M2 | SYSTOLIC BLOOD PRESSURE: 92 MMHG | TEMPERATURE: 96.8 F | WEIGHT: 98.2 LBS

## 2022-05-31 DIAGNOSIS — L89.154 SACRAL DECUBITUS ULCER, STAGE IV (HCC): Primary | ICD-10-CM

## 2022-05-31 PROCEDURE — 87070 CULTURE OTHR SPECIMN AEROBIC: CPT

## 2022-05-31 PROCEDURE — 11045 DBRDMT SUBQ TISS EACH ADDL: CPT

## 2022-05-31 PROCEDURE — 87186 SC STD MICRODIL/AGAR DIL: CPT

## 2022-05-31 PROCEDURE — 87205 SMEAR GRAM STAIN: CPT

## 2022-05-31 PROCEDURE — 11045 DBRDMT SUBQ TISS EACH ADDL: CPT | Performed by: SURGERY

## 2022-05-31 PROCEDURE — 11042 DBRDMT SUBQ TIS 1ST 20SQCM/<: CPT | Performed by: SURGERY

## 2022-05-31 PROCEDURE — 87077 CULTURE AEROBIC IDENTIFY: CPT

## 2022-05-31 PROCEDURE — 11042 DBRDMT SUBQ TIS 1ST 20SQCM/<: CPT

## 2022-05-31 RX ORDER — GINSENG 100 MG
CAPSULE ORAL ONCE
Status: CANCELLED | OUTPATIENT
Start: 2022-05-31 | End: 2022-05-31

## 2022-05-31 RX ORDER — BACITRACIN ZINC AND POLYMYXIN B SULFATE 500; 1000 [USP'U]/G; [USP'U]/G
OINTMENT TOPICAL ONCE
Status: CANCELLED | OUTPATIENT
Start: 2022-05-31 | End: 2022-05-31

## 2022-05-31 RX ORDER — GENTAMICIN SULFATE 1 MG/G
OINTMENT TOPICAL ONCE
Status: CANCELLED | OUTPATIENT
Start: 2022-05-31 | End: 2022-05-31

## 2022-05-31 RX ORDER — LIDOCAINE HYDROCHLORIDE 20 MG/ML
JELLY TOPICAL ONCE
Status: CANCELLED | OUTPATIENT
Start: 2022-05-31 | End: 2022-05-31

## 2022-05-31 RX ORDER — BETAMETHASONE DIPROPIONATE 0.05 %
OINTMENT (GRAM) TOPICAL ONCE
Status: CANCELLED | OUTPATIENT
Start: 2022-05-31 | End: 2022-05-31

## 2022-05-31 RX ORDER — LIDOCAINE 40 MG/G
CREAM TOPICAL ONCE
Status: CANCELLED | OUTPATIENT
Start: 2022-05-31 | End: 2022-05-31

## 2022-05-31 RX ORDER — CLOBETASOL PROPIONATE 0.5 MG/G
OINTMENT TOPICAL ONCE
Status: CANCELLED | OUTPATIENT
Start: 2022-05-31 | End: 2022-05-31

## 2022-05-31 RX ORDER — LIDOCAINE HYDROCHLORIDE 40 MG/ML
SOLUTION TOPICAL ONCE
Status: CANCELLED | OUTPATIENT
Start: 2022-05-31 | End: 2022-05-31

## 2022-05-31 RX ORDER — BACITRACIN, NEOMYCIN, POLYMYXIN B 400; 3.5; 5 [USP'U]/G; MG/G; [USP'U]/G
OINTMENT TOPICAL ONCE
Status: CANCELLED | OUTPATIENT
Start: 2022-05-31 | End: 2022-05-31

## 2022-05-31 RX ORDER — LIDOCAINE 50 MG/G
OINTMENT TOPICAL ONCE
Status: CANCELLED | OUTPATIENT
Start: 2022-05-31 | End: 2022-05-31

## 2022-05-31 RX ORDER — LIDOCAINE 40 MG/G
CREAM TOPICAL ONCE
Status: DISCONTINUED | OUTPATIENT
Start: 2022-05-31 | End: 2022-06-01 | Stop reason: HOSPADM

## 2022-05-31 NOTE — TELEPHONE ENCOUNTER
----- Message from Nguyen Isaac sent at 5/31/2022  2:46 PM EDT -----  Regarding: New Medication and Schedule  Contact: Carmel Molina  Patient is now on doxycycline, called to schedule appt but no times available where they wanted, said would talk to caregiver and call back

## 2022-05-31 NOTE — PROGRESS NOTES
days: 28       Wound 05/03/22 Ischium Left #3 (Active)   Wound Image   05/03/22 1105   Wound Etiology Pressure Stage 3 05/31/22 1006   Wound Cleansed Cleansed with saline 05/31/22 1010   Wound Length (cm) 3.1 cm 05/31/22 1006   Wound Width (cm) 5.3 cm 05/31/22 1006   Wound Depth (cm) 1 cm 05/31/22 1006   Wound Surface Area (cm^2) 16.43 cm^2 05/31/22 1006   Change in Wound Size % (l*w) 59.23 05/31/22 1006   Wound Volume (cm^3) 16.43 cm^3 05/31/22 1006   Wound Healing % -2 05/31/22 1006   Post-Procedure Length (cm) 3.1 cm 05/31/22 1010   Post-Procedure Width (cm) 5.3 cm 05/31/22 1010   Post-Procedure Depth (cm) 1.2 cm 05/31/22 1010   Post-Procedure Surface Area (cm^2) 16.43 cm^2 05/31/22 1010   Post-Procedure Volume (cm^3) 19.716 cm^3 05/31/22 1010   Wound Assessment Bleeding 05/31/22 1010   Drainage Amount Moderate 05/31/22 1010   Drainage Description Serosanguinous 05/31/22 1006   Odor None 05/31/22 1006   Merlyn-wound Assessment Intact 05/31/22 1006   Margins Defined edges 05/31/22 1006   Number of days: 28           Total Surface Area Debrided:  21.23 sq cm     Bleeding:  Minimal    Hemostasis Achieved:  by pressure    Procedural Pain:  3  / 10     Post Procedural Pain:  0 / 10     Response to treatment:  Well tolerated by patient. The nature of the patient's condition was explained in depth. The patient was informed that their compliance to the treatment plan is paramount to successful healing and prevention of further ulceration and/or infection     Treatment Plan:   65-year-old female seen in follow-up for multiple pressure ulcerations. The right ischial ulceration has healed. The left ischial ulceration is deeper centrally. The left ischial ulcer and sacral ulcers were debrided. They are both deep stage III pressure ulcerations. A culture was obtained on the left side. We will change to silver alginate to both ulcers. Continue antipressure measurements and dietary supplements.   Follow-up in 1 week.    Niall Martinez MD, M.D.  5/31/2022

## 2022-05-31 NOTE — TELEPHONE ENCOUNTER
Returned call to patient. Explained unable to adjust dose of warfarin for abx since unclear where INR is since flucutating greatly for unexplained etiology recently and missed appts. Started doxycycline on Fri 5/27. She refused to r/s appt to [facility] currently d/t transportation.  Asked for pt to call back ASAP, states she will call in AM

## 2022-05-31 NOTE — PLAN OF CARE
Discharge instructions given. Patient verbalized understanding. Return to [address] [place] [address],[address] in 1 week(s). Called/faxed orders to [facility] for Alginate ag  Pt instructed to call [facility] on 6/3 to have them ship previous order, AMELIA.

## 2022-05-31 NOTE — PROGRESS NOTES
[address] [facility],[address]:      [address] f: [phone] f: [phone] p: [phone] [email or web address]     Ordering Center: [address]  [facility] [address]  [phone]  Dept: [phone]   Fax# [phone]    Patient Information:      [address] [facility] [address] [place] [address]   [phone]   : 1980  AGE: 46 y.o. GENDER: female   TODAYS DATE:  2022    Insurance:      PRIMARY INSURANCE:  Plan: MEDICAID [facility] DEPT OF JOB  Coverage: [place]ICA[place]  Effective Date: 2015  Group Number: [unfilled]  Subscriber Number: [id number] - (Medicaid)    Payor/Plan Subscr  Sex Relation Sub. Ins. ID Effective Group Num   1.  [place]ICA[place] -* DEMARCO SCHWARZ 1980 Female Self [id number] 1/1/15                                    [address]         Patient Wound Information:     Additional ICD-10 Codes:     Patient Active Problem List   Diagnosis Code    DVT prophylaxis Z29.9    Pulmonary emboli ([address] I26.99    Long term (current) use of anticoagulants Z79.01    Sacral decubitus ulcer, stage IV ([address] L89.154       WOUNDS REQUIRING DRESSING SUPPLIES:     Wound 22 Sacrum #2 (Active)   Wound Image   22 1102   Wound Etiology Pressure Stage 3 22 1006   Wound Cleansed Cleansed with saline 22 1006   Wound Length (cm) 2.4 cm 22 1006   Wound Width (cm) 2 cm 22 1006   Wound Depth (cm) 0.8 cm 22 1006   Wound Surface Area (cm^2) 4.8 cm^2 22 1006   Change in Wound Size % (l*w) -303.36 22 1006   Wound Volume (cm^3) 3.84 cm^3 22 1006   Wound Healing % -707 22 1006   Post-Procedure Length (cm) 2.4 cm 22 1010   Post-Procedure Width (cm) 2 cm 22 1010   Post-Procedure Depth (cm) 0.8 cm 22 1010   Post-Procedure Surface Area (cm^2) 4.8 cm^2 22 1010   Post-Procedure Volume (cm^3) 3.84 cm^3 22 1010   Wound Assessment Bleeding 05/31/22 1010   Drainage Amount Moderate 05/31/22 1010   Drainage Description Serosanguinous 05/31/22 1006   Odor None 05/31/22 1006   Merlyn-wound Assessment Dry/flaky 05/31/22 1006   Margins Defined edges 05/31/22 1006   Number of days: 28       Wound 05/03/22 Ischium Left #3 (Active)   Wound Image   05/03/22 1105   Wound Etiology Pressure Stage 3 05/31/22 1006   Wound Cleansed Cleansed with saline 05/31/22 1010   Wound Length (cm) 3.1 cm 05/31/22 1006   Wound Width (cm) 5.3 cm 05/31/22 1006   Wound Depth (cm) 1 cm 05/31/22 1006   Wound Surface Area (cm^2) 16.43 cm^2 05/31/22 1006   Change in Wound Size % (l*w) 59.23 05/31/22 1006   Wound Volume (cm^3) 16.43 cm^3 05/31/22 1006   Wound Healing % -2 05/31/22 1006   Post-Procedure Length (cm) 3.1 cm 05/31/22 1010   Post-Procedure Width (cm) 5.3 cm 05/31/22 1010   Post-Procedure Depth (cm) 1.2 cm 05/31/22 1010   Post-Procedure Surface Area (cm^2) 16.43 cm^2 05/31/22 1010   Post-Procedure Volume (cm^3) 19.716 cm^3 05/31/22 1010   Wound Assessment Bleeding 05/31/22 1010   Drainage Amount Moderate 05/31/22 1010   Drainage Description Serosanguinous 05/31/22 1006   Odor None 05/31/22 1006   Merlyn-wound Assessment Intact 05/31/22 1006   Margins Defined edges 05/31/22 1006   Number of days: 28          Supplies Requested :      WOUND #: 2 and 3   PRIMARY DRESSING:    Alginate with silver pad   Cover and Secure with: None     FREQUENCY OF DRESSING CHANGES:  Daily    Wound Thickness [x] Full   []Partial     Patient Wound(s) Debrided: [x] Yes   [] No    Debridement Date: 5/31/2022    Debribement Type: Excisional/Sharp    ADDITIONAL ITEMS:  [] Gloves Small  [x] Gloves Medium [] Gloves Large [] Gloves Gevena Ambrosia  [] Paper Tape 1\" [] Paper Tape 2\" [] Paper Tape 3\"  [] Medipore Tape 3\"  [] Saline  [] Skin Prep   [] Adhesive Remover   [] Cotton Tip Applicators  [] Tubular Stocking   [] Size E  [] Size G  [] Other:    Patient currently being seen by Home Health: [] Yes [x] No    Duration for needed supplies:  [x]15  []30  []60  []90 Days    Provider Information:      PROVIDER'S NAME/NPI  Adriana Guzman MD NPI: [id number]   I give permission to coordinate the care for this patient

## 2022-06-02 ENCOUNTER — APPOINTMENT (OUTPATIENT)
Dept: PHARMACY | Age: 42
End: 2022-06-02
Payer: MEDICAID

## 2022-06-03 ENCOUNTER — ANTI-COAG VISIT (OUTPATIENT)
Dept: PHARMACY | Age: 42
End: 2022-06-03
Payer: MEDICAID

## 2022-06-03 DIAGNOSIS — Z79.01 LONG TERM (CURRENT) USE OF ANTICOAGULANTS: ICD-10-CM

## 2022-06-03 DIAGNOSIS — Z29.9 DVT PROPHYLAXIS: Primary | ICD-10-CM

## 2022-06-03 DIAGNOSIS — I26.09 PULMONARY EMBOLISM WITH ACUTE COR PULMONALE, UNSPECIFIED CHRONICITY, UNSPECIFIED PULMONARY EMBOLISM TYPE (HCC): ICD-10-CM

## 2022-06-03 LAB
GRAM STAIN RESULT: ABNORMAL
INTERNATIONAL NORMALIZATION RATIO, POC: 2.1
ORGANISM: ABNORMAL
WOUND/ABSCESS: ABNORMAL

## 2022-06-03 PROCEDURE — 99212 OFFICE O/P EST SF 10 MIN: CPT

## 2022-06-03 PROCEDURE — 85610 PROTHROMBIN TIME: CPT

## 2022-06-03 NOTE — PROGRESS NOTES
Ms. Tony Narvaez is a 46 y.o. y/o female with history of DVT, PE in about 2010. She presents today for anticoagulation monitoring and adjustment. Pertinent PMH: DVT assoc with PICC line  Hx of multiple sclerosis,     Patient Reported Findings:  Yes     No  []   [x]       Patient verifies current dosing regimen as listed---> uses AVS --> confirms dose --> does not know dose, but takes what RN gives her but then states that she might have taken 5 mg daily but took old dose. Does not know dose, uses AVS but AVS ran out last week so she is unsure after that --> states that aide followed avs for doses --> spoke with prasanth HEWITT, 2.5 on Sun, Tues and Thurs and 5 mg all other days of the week---> need to call Marcelo Gillespie     []   [x]       S/S bleeding/bruising/swelling/SOB -denies   []   [x]       Blood in urine or stool - denies   []   [x]       Procedures scheduled in the future at this time  ---> none upcoming   []   [x]       Missed Dose -denies   []   [x]       Extra Dose - denies  []   [x]       Change in medications-using perc and tylenol prn for pain with new braces- states this will continue---> takes tylenol prn ---> cefpodoxil finished several days ago, had several tylenol --> no more tylenol, no other changes in meds -->has not been taking sea rivero for a while, unclear when --> cefpodoxime from ED, no interaction---> doxy started 5/27- done today   []   [x]       Change in health/diet/appetite Normal vegetable is about twice a week and generally a low vitamin K. When she has broccoli it would be in a stir truong. May have a salad only once a month.---> had vegetables, no significant vit k ---> had greens twice since last visit. States that she had done a detox with tea (haven and turmeric tea)  had greens 2-3 times in the past week, no NVD --> no changes, greens 2-3 times, no NVD --> no vomiting.  No greens d/t being ill. ---> had greens 3x this week --> had greens a few times, no V/D --> tried to have more vit k to help INR ---> states had more greens, diarrhea last week, no NV --> had green beans this weekend. Plans to have greens this week --> no changes   []   [x]       Change in alcohol use adamant that she did not drink alcohol while in [place] --> states now that she had one glass of alcohol in [place]. none recently --> denies---> had a large mixed drink in last week---> denies    []   [x]       Change in activity  []   [x]       Hospital admission   [x]   []       Emergency department visit- at [facility] 5/13-5/14 for UTI. INR 2. 9. was prescribed cefpodoxime. Was given warfarin 2.5 mg in hospital      5/21- arturo bui   5/24-CRYSTAL, was told to take 1.5 pills of warfarin and recheck INR dt INR of 1.4  []   [x]       Other complaints   Clinical Outcomes:  Yes     No  []   [x]       Major bleeding event  []   [x]       Thromboembolic event    Duration of warfarin therapy: Indefinitely  INR Range:  2.0-3.0     Continues to miss apts and miss doses. Pt rarely offers up any information to determine cause of INR fluctuations. Edwin Rice, RN fills [employer]. Will need to call and clarify what is going on---> pt could not remember number and didn't have phone, will call me when she gets home to let me know what Cynthia's number is and will need to discuss this as there is no way she followed AVS from last visit. Presents with pillbox and warfarin tablets, warfarin is out of pillbox, none in box. Verified does have warfarin 2.5 mg tablets     Was due back 5/27. INR is 2.1 today    Take 3.75 mg on Tues and 2.5 mg all other days of the week. Encouraged to maintain a consistency of vegetables/salads.    Recheck INR in 10 days, 6/13    **consent form signed 10/27/2021    Referring PCP is Dr. Sophy Laguerre results  INR (no units)   Date Value   06/03/2022 2.1   05/17/2022 2.5   05/09/2022 5   05/03/2022 7.9   10/05/2020 8.93 (HH)   09/19/2020 3.22 (H)   07/17/2020 2.16 (H)   06/11/2020 3.55 (H)       For Pharmacy Admin Tracking Only     Total # of Interventions Recommended: 0   Total # of Interventions Accepted: 0   Time Spent (min): 20

## 2022-06-07 ENCOUNTER — HOSPITAL ENCOUNTER (OUTPATIENT)
Dept: WOUND CARE | Age: 42
Discharge: HOME | End: 2022-06-07

## 2022-06-13 ENCOUNTER — TELEPHONE (OUTPATIENT)
Dept: PHARMACY | Age: 42
End: 2022-06-13

## 2022-06-14 ENCOUNTER — APPOINTMENT (OUTPATIENT)
Dept: WOUND CARE | Age: 42
End: 2022-06-14
Payer: MEDICAID

## 2022-06-14 ENCOUNTER — HOSPITAL ENCOUNTER (OUTPATIENT)
Dept: WOUND CARE | Age: 42
Discharge: HOME | End: 2022-06-14
Payer: MEDICAID

## 2022-06-14 DIAGNOSIS — L89.154 SACRAL DECUBITUS ULCER, STAGE IV (HCC): Primary | ICD-10-CM

## 2022-06-14 PROCEDURE — 11042 DBRDMT SUBQ TIS 1ST 20SQCM/<: CPT | Performed by: SURGERY

## 2022-06-14 PROCEDURE — 11042 DBRDMT SUBQ TIS 1ST 20SQCM/<: CPT

## 2022-06-14 RX ORDER — CLOBETASOL PROPIONATE 0.5 MG/G
OINTMENT TOPICAL ONCE
Status: CANCELLED | OUTPATIENT
Start: 2022-06-14 | End: 2022-06-14

## 2022-06-14 RX ORDER — LIDOCAINE HYDROCHLORIDE 40 MG/ML
SOLUTION TOPICAL ONCE
Status: CANCELLED | OUTPATIENT
Start: 2022-06-14 | End: 2022-06-14

## 2022-06-14 RX ORDER — GENTAMICIN SULFATE 1 MG/G
OINTMENT TOPICAL ONCE
Status: CANCELLED | OUTPATIENT
Start: 2022-06-14 | End: 2022-06-14

## 2022-06-14 RX ORDER — LIDOCAINE 40 MG/G
CREAM TOPICAL ONCE
Status: CANCELLED | OUTPATIENT
Start: 2022-06-14 | End: 2022-06-14

## 2022-06-14 RX ORDER — GINSENG 100 MG
CAPSULE ORAL ONCE
Status: CANCELLED | OUTPATIENT
Start: 2022-06-14 | End: 2022-06-14

## 2022-06-14 RX ORDER — LIDOCAINE HYDROCHLORIDE 20 MG/ML
JELLY TOPICAL ONCE
Status: CANCELLED | OUTPATIENT
Start: 2022-06-14 | End: 2022-06-14

## 2022-06-14 RX ORDER — BACITRACIN, NEOMYCIN, POLYMYXIN B 400; 3.5; 5 [USP'U]/G; MG/G; [USP'U]/G
OINTMENT TOPICAL ONCE
Status: CANCELLED | OUTPATIENT
Start: 2022-06-14 | End: 2022-06-14

## 2022-06-14 RX ORDER — BETAMETHASONE DIPROPIONATE 0.05 %
OINTMENT (GRAM) TOPICAL ONCE
Status: CANCELLED | OUTPATIENT
Start: 2022-06-14 | End: 2022-06-14

## 2022-06-14 RX ORDER — LIDOCAINE 40 MG/G
CREAM TOPICAL ONCE
Status: DISCONTINUED | OUTPATIENT
Start: 2022-06-14 | End: 2022-06-15 | Stop reason: HOSPADM

## 2022-06-14 RX ORDER — BACITRACIN ZINC AND POLYMYXIN B SULFATE 500; 1000 [USP'U]/G; [USP'U]/G
OINTMENT TOPICAL ONCE
Status: CANCELLED | OUTPATIENT
Start: 2022-06-14 | End: 2022-06-14

## 2022-06-14 RX ORDER — LIDOCAINE 50 MG/G
OINTMENT TOPICAL ONCE
Status: CANCELLED | OUTPATIENT
Start: 2022-06-14 | End: 2022-06-14

## 2022-06-14 NOTE — TELEPHONE ENCOUNTER
L/m for pt to call and reschedule. [facility]le [facility] wants patient back in for an INR check asap due to doxycycline.

## 2022-06-14 NOTE — PLAN OF CARE
Discharge instructions given. Patient verbalized understanding. Return to [facility] in 2 week(s).

## 2022-06-14 NOTE — TELEPHONE ENCOUNTER
Patient called to get into the clinic today. We did not have any available openings. She will call when she gets home to reschedule.

## 2022-06-14 NOTE — PROGRESS NOTES
Ramona Morin  AGE: 46 y.o. GENDER: female  : 1980  TODAY'S DATE:  2022    Chief Complaint   Patient presents with    Wound Check     Follow up pressure wounds        HISTORY of PRESENT ILLNESS HPI     Ramona Morin is a 46 y.o. female who presents today for wound evaluation. History of Wound: Pressure ulcerations  Wound Pain:  mild  Severity:  3 / 10   Wound Type:  pressure  Modifying Factors:  none  Associated Signs/Symptoms:  none    Procedure Note    Performed by: Lizzette Renee MD    Consent obtained: Yes    Time out taken:  Yes    Pain Control: Anesthetic  Anesthetic: 4% Lidocaine Cream     Debridement:Excisional Debridement    Using curette the wound was sharply debrided    down through and including the removal of subcutaneous tissue.         Devitalized Tissue Debrided:  necrotic/eschar    Pre Debridement Measurements:  Are located in the Wound Documentation Flow Sheet    Wound #: 2 and 3     Post  Debridement Measurements:  Wound 22 Sacrum #2 (Active)   Wound Image   22 1102   Wound Etiology Pressure Stage 3 22 0951   Wound Cleansed Cleansed with saline 22 1018   Dressing/Treatment Alginate;Dry dressing 22 1010   Wound Length (cm) 1.5 cm 22 0951   Wound Width (cm) 1 cm 22 0951   Wound Depth (cm) 0.8 cm 22 0951   Wound Surface Area (cm^2) 1.5 cm^2 22 0951   Change in Wound Size % (l*w) -26.05 22 0951   Wound Volume (cm^3) 1.2 cm^3 22 0951   Wound Healing % -152 22 0951   Post-Procedure Length (cm) 1.5 cm 22 1018   Post-Procedure Width (cm) 1 cm 22 1018   Post-Procedure Depth (cm) 0.8 cm 22 1018   Post-Procedure Surface Area (cm^2) 1.5 cm^2 22 1018   Post-Procedure Volume (cm^3) 1.2 cm^3 22 1018   Wound Assessment Bleeding 22 1018   Drainage Amount Moderate 22 1018   Drainage Description Serosanguinous 22 0951   Odor None 22 0951   Merlyn-wound Assessment Intact 06/14/22 0951   Margins Attached edges; Defined edges 06/14/22 0951   Number of days: 42       Wound 05/03/22 Ischium Left #3 (Active)   Wound Image   05/03/22 1105   Wound Etiology Pressure Stage 3 06/14/22 0951   Wound Cleansed Cleansed with saline 06/14/22 1018   Dressing/Treatment Alginate;Dry dressing 05/31/22 1010   Wound Length (cm) 3.2 cm 06/14/22 0951   Wound Width (cm) 3 cm 06/14/22 0951   Wound Depth (cm) 1.5 cm 06/14/22 0951   Wound Surface Area (cm^2) 9.6 cm^2 06/14/22 0951   Change in Wound Size % (l*w) 76.18 06/14/22 0951   Wound Volume (cm^3) 14.4 cm^3 06/14/22 0951   Wound Healing % 11 06/14/22 0951   Post-Procedure Length (cm) 3.2 cm 06/14/22 1018   Post-Procedure Width (cm) 3 cm 06/14/22 1018   Post-Procedure Depth (cm) 1.5 cm 06/14/22 1018   Post-Procedure Surface Area (cm^2) 9.6 cm^2 06/14/22 1018   Post-Procedure Volume (cm^3) 14.4 cm^3 06/14/22 1018   Wound Assessment Bleeding 06/14/22 1018   Drainage Amount Moderate 06/14/22 1018   Drainage Description Serosanguinous 06/14/22 0951   Odor None 06/14/22 0951   Merlyn-wound Assessment Intact 06/14/22 0951   Margins Attached edges; Defined edges 06/14/22 0951   Number of days: 42           Total Surface Area Debrided:  11.1 sq cm     Bleeding:  Minimal    Hemostasis Achieved:  by pressure    Procedural Pain:  3  / 10     Post Procedural Pain:  0 / 10     Response to treatment:  Well tolerated by patient. The nature of the patient's condition was explained in depth. The patient was informed that their compliance to the treatment plan is paramount to successful healing and prevention of further ulceration and/or infection     Treatment Plan:   68-year-old female with multiple sclerosis who is seen in follow-up for stage III pressure ulcerations of the left ischial region and sacral region. Both areas are slowly improving. Debridement was performed. She is taking Myron supplements once daily.   Despite this, I am concerned that her nutrition is an issue. We discussed placement of a PEG tube but she is not interested. Continue antipressure measurements, silver alginate dressing changes and Myron supplements. Follow-up in the wound center in 2 weeks.     Thi Danielson MD, M.D.  6/14/2022

## 2022-06-17 ENCOUNTER — ANTI-COAG VISIT (OUTPATIENT)
Dept: PHARMACY | Age: 42
End: 2022-06-17
Payer: MEDICAID

## 2022-06-17 DIAGNOSIS — Z79.01 LONG TERM (CURRENT) USE OF ANTICOAGULANTS: ICD-10-CM

## 2022-06-17 DIAGNOSIS — I26.09 PULMONARY EMBOLISM WITH ACUTE COR PULMONALE, UNSPECIFIED CHRONICITY, UNSPECIFIED PULMONARY EMBOLISM TYPE (HCC): ICD-10-CM

## 2022-06-17 DIAGNOSIS — Z29.9 DVT PROPHYLAXIS: Primary | ICD-10-CM

## 2022-06-17 LAB
INR BLD: 5.12 (ref 0.87–1.14)
PROTHROMBIN TIME: 47.9 SEC (ref 11.7–14.5)

## 2022-06-17 PROCEDURE — 99212 OFFICE O/P EST SF 10 MIN: CPT

## 2022-06-17 PROCEDURE — 85610 PROTHROMBIN TIME: CPT

## 2022-06-17 PROCEDURE — 36415 COLL VENOUS BLD VENIPUNCTURE: CPT

## 2022-06-17 NOTE — PROGRESS NOTES
Ms. Maria T Laughlin is a 46 y.o. y/o female with history of DVT, PE in about 2010. She presents today for anticoagulation monitoring and adjustment. Pertinent PMH: DVT assoc with PICC line  Hx of multiple sclerosis,     Patient Reported Findings:  Yes     No  []   [x]       Patient verifies current dosing regimen as listed---> uses AVS --> confirms dose --> does not know dose, but takes what RN gives her but then states that she might have taken 5 mg daily but took old dose. Does not know dose, uses AVS but AVS ran out last week so she is unsure after that --> states that aide followed avs for doses --> spoke with prasanth HEWITT, 2.5 on Sun, Tues and Thurs and 5 mg all other days of the week---> need to call [facility]     []   [x]       S/S bleeding/bruising/swelling/SOB -denies   []   [x]       Blood in urine or stool - denies   []   [x]       Procedures scheduled in the future at this time  ---> none upcoming   []   [x]       Missed Dose -denies   []   [x]       Extra Dose - denies  []   [x]       Change in medications-using perc and tylenol prn for pain with new braces- states this will continue---> takes tylenol prn ---> cefpodoxil finished several days ago, had several tylenol --> no more tylenol, no other changes in meds -->has not been taking sea rivero for a while, unclear when --> cefpodoxime from ED, no interaction---> doxy started 5/27- done today   []   [x]       Change in health/diet/appetite Normal vegetable is about twice a week and generally a low vitamin K. When she has broccoli it would be in a stir truong. May have a salad only once a month.---> had vegetables, no significant vit k ---> had greens twice since last visit. States that she had done a detox with tea (haven and turmeric tea)  had greens 2-3 times in the past week, no NVD --> no changes, greens 2-3 times, no NVD --> no vomiting.  No greens d/t being ill. ---> had greens 3x this week --> had greens a few times, no V/D --> tried to have more vit k to help INR ---> states had more greens, diarrhea last week, no NV --> had green beans this weekend. Plans to have greens this week --> no changes   []   [x]       Change in alcohol use adamant that she did not drink alcohol while in [place] --> states now that she had one glass of alcohol in [facility]. none recently --> denies---> had a large mixed drink in last week---> denies    []   [x]       Change in activity  []   [x]       Hospital admission   [x]   []       Emergency department visit- at [facility] 5/13-5/14 for UTI. INR 2. 9. was prescribed cefpodoxime. Was given warfarin 2.5 mg in hospital      5/21- arturo bui   5/24-CRYSTAL, was told to take 1.5 pills of warfarin and recheck INR dt INR of 1.4  []   [x]       Other complaints   Clinical Outcomes:  Yes     No  []   [x]       Major bleeding event  []   [x]       Thromboembolic event    Duration of warfarin therapy: Indefinitely  INR Range:  2.0-3.0     Continues to miss apts and miss doses. Pt rarely offers up any information to determine cause of INR fluctuations. Annabelle Mcleod, RN fills [facility]. Will need to call and clarify what is going on. [phone]      INR is a lab draw (5.12) today    Was due back 6/13. Pt had 2 courses of doxy, and 2 pills of fluconazole. Thinks that she still is on doxy for 5 days, done with flucon. Called Cynthia to discuss dosing changes. Hold today and tomorrow then continue taking dose of 3.75 mg on Tues and 2.5 mg all other days of the week    Encouraged to maintain a consistency of vegetables/salads.    Recheck INR in 1 week, 6/24    **consent form signed 10/27/2021    Referring PCP is Dr. Demetria Vizcaino results  INR (no units)   Date Value   06/17/2022 5.12 (HH)   10/05/2020 8.93 (HH)   09/19/2020 3.22 (H)   07/17/2020 2.16 (H)     INR,(POC) (no units)   Date Value   06/03/2022 2.1   05/17/2022 2.5   05/09/2022 5   05/03/2022 7.9   For Pharmacy Admin Tracking Only     Intervention Detail: Dose Adjustment: 1, reason: Interaction and Lab(s) Ordered   Total # of Interventions Recommended: 2   Total # of Interventions Accepted: 2   Time Spent (min): 30

## 2022-06-27 ENCOUNTER — HOSPITAL ENCOUNTER (OUTPATIENT)
Age: 42
Setting detail: SPECIMEN
Discharge: HOME | End: 2022-06-27
Payer: MEDICAID

## 2022-06-27 ENCOUNTER — TELEPHONE (OUTPATIENT)
Dept: PHARMACY | Age: 42
End: 2022-06-27

## 2022-06-27 LAB
ALBUMIN SERPL-MCNC: 1.9 G/DL (ref 3.4–5)
ALP BLD-CCNC: 114 U/L (ref 40–129)
ALT SERPL-CCNC: 8 U/L (ref 10–40)
ANION GAP SERPL CALCULATED.3IONS-SCNC: 7 MMOL/L (ref 3–16)
AST SERPL-CCNC: 12 U/L (ref 15–37)
BASOPHILS ABSOLUTE: 0 K/UL (ref 0–0.2)
BASOPHILS RELATIVE PERCENT: 0.3 %
BILIRUB SERPL-MCNC: <0.2 MG/DL (ref 0–1)
BILIRUBIN DIRECT: <0.2 MG/DL (ref 0–0.3)
BILIRUBIN, INDIRECT: ABNORMAL MG/DL (ref 0–1)
BUN BLDV-MCNC: 4 MG/DL (ref 7–20)
C-REACTIVE PROTEIN: 25.5 MG/L (ref 0–5.1)
CALCIUM SERPL-MCNC: 8.1 MG/DL (ref 8.3–10.6)
CHLORIDE BLD-SCNC: 111 MMOL/L (ref 99–110)
CO2: 25 MMOL/L (ref 21–32)
CREAT SERPL-MCNC: <0.5 MG/DL (ref 0.6–1.1)
EOSINOPHILS ABSOLUTE: 0.1 K/UL (ref 0–0.6)
EOSINOPHILS RELATIVE PERCENT: 1.7 %
GFR AFRICAN AMERICAN: >60
GFR NON-AFRICAN AMERICAN: >60
GLUCOSE BLD-MCNC: 74 MG/DL (ref 70–99)
HCT VFR BLD CALC: 24.2 % (ref 36–48)
HEMOGLOBIN: 7.9 G/DL (ref 12–16)
LYMPHOCYTES ABSOLUTE: 0.9 K/UL (ref 1–5.1)
LYMPHOCYTES RELATIVE PERCENT: 13.7 %
MCH RBC QN AUTO: 27.8 PG (ref 26–34)
MCHC RBC AUTO-ENTMCNC: 32.5 G/DL (ref 31–36)
MCV RBC AUTO: 85.4 FL (ref 80–100)
MONOCYTES ABSOLUTE: 0.7 K/UL (ref 0–1.3)
MONOCYTES RELATIVE PERCENT: 10.4 %
NEUTROPHILS ABSOLUTE: 4.8 K/UL (ref 1.7–7.7)
NEUTROPHILS RELATIVE PERCENT: 73.9 %
PDW BLD-RTO: 14.7 % (ref 12.4–15.4)
PHOSPHORUS: 3.9 MG/DL (ref 2.5–4.9)
PLATELET # BLD: 539 K/UL (ref 135–450)
PMV BLD AUTO: 7.3 FL (ref 5–10.5)
POTASSIUM SERPL-SCNC: 2.6 MMOL/L (ref 3.5–5.1)
RBC # BLD: 2.83 M/UL (ref 4–5.2)
SEDIMENTATION RATE, ERYTHROCYTE: 45 MM/HR (ref 0–20)
SODIUM BLD-SCNC: 143 MMOL/L (ref 136–145)
TOTAL PROTEIN: 4.8 G/DL (ref 6.4–8.2)
VANCOMYCIN TROUGH: 11.1 UG/ML (ref 10–20)
WBC # BLD: 6.5 K/UL (ref 4–11)

## 2022-06-27 PROCEDURE — 85025 COMPLETE CBC W/AUTO DIFF WBC: CPT

## 2022-06-27 PROCEDURE — 80069 RENAL FUNCTION PANEL: CPT

## 2022-06-27 PROCEDURE — 86140 C-REACTIVE PROTEIN: CPT

## 2022-06-27 PROCEDURE — 85652 RBC SED RATE AUTOMATED: CPT

## 2022-06-27 PROCEDURE — 80076 HEPATIC FUNCTION PANEL: CPT

## 2022-06-27 PROCEDURE — 80202 ASSAY OF VANCOMYCIN: CPT

## 2022-06-27 PROCEDURE — 36415 COLL VENOUS BLD VENIPUNCTURE: CPT

## 2022-06-27 NOTE — TELEPHONE ENCOUNTER
Called to see if the RN has been out yet today and to find out the plan for patient. LVM.     So Borrero, PharmD, h

## 2022-06-27 NOTE — TELEPHONE ENCOUNTER
----- Message from Sayra Peoples sent at 6/27/2022  1:08 PM EDT -----  Regarding: New IV antibiotics  Contact: patient  Patient LMV on Sunday stating she was admitted to [facility] on 6/18 and just released on 6/26. States her warfarin was held the entire time. Going home on lots of IV antibiotics and she will call once the home care nurses have been to her home and she confirms what antibiotics she will be getting. States she knows one will be Vanco but, she was also on IV cipro and flagyl in the hospital.   [phone] to call back, if we do not hear from her today.

## 2022-06-28 ENCOUNTER — HOSPITAL ENCOUNTER (OUTPATIENT)
Dept: WOUND CARE | Age: 42
Discharge: HOME | End: 2022-06-28

## 2022-06-28 NOTE — TELEPHONE ENCOUNTER
in [address] [place] [address] 6/19-6/24 for UTI. prescribed flagyl 500 mg TID x 12 d, ceftriaxone IV x 12 d, vancomycin IV x 12 d. INR 6/17 5.12 (likely warfarin held, unable to verify) INR 6/23 was 2.3. Called and spoke to patient. States that she will have to talk to RN today about RTC this week. Provided with available appts this week and asked for pt to return call asap. Advised for patient to hold dose of warfarin today (6/28). Will provide further instructions when determined able to RTC.

## 2022-07-01 NOTE — TELEPHONE ENCOUNTER
Called patient again before leaving. Did not answer. Explained on VM that I would highly recommend going to ER or contacting doctor to check INR as unable to provide dosing.      Arely Morin, LukaszD, [facility]

## 2022-07-01 NOTE — TELEPHONE ENCOUNTER
Patient called back. Pt states has been off of warfarin since 6/28 because she wasn't sure what to take. Explained she has no transportation or any nurses to bring her this morning. She will try to find someone for this afternoon, explained we have several apts open still and will need to see her before the long weekend. Explained that I will be calling her again before lunch to see what time works because it is important to monitor her INR with recent fluctuations.      Wilberto Mills, PharmD, [facility]

## 2022-07-01 NOTE — TELEPHONE ENCOUNTER
Called and LVM to see what time this afternoon works best.    Devi Beltrán, PharmD, [facility]

## 2022-07-01 NOTE — TELEPHONE ENCOUNTER
Pt called and told Kelvin Webb she could not come in today. Called and LVM asking for return call to discuss.     Braden Sutton, LukaszD, RPh

## 2022-07-05 ENCOUNTER — ANTI-COAG VISIT (OUTPATIENT)
Dept: PHARMACY | Age: 42
End: 2022-07-05
Payer: MEDICAID

## 2022-07-05 DIAGNOSIS — I26.09 PULMONARY EMBOLISM WITH ACUTE COR PULMONALE, UNSPECIFIED CHRONICITY, UNSPECIFIED PULMONARY EMBOLISM TYPE (HCC): ICD-10-CM

## 2022-07-05 DIAGNOSIS — Z29.9 DVT PROPHYLAXIS: Primary | ICD-10-CM

## 2022-07-05 DIAGNOSIS — Z79.01 LONG TERM (CURRENT) USE OF ANTICOAGULANTS: ICD-10-CM

## 2022-07-05 LAB — INTERNATIONAL NORMALIZATION RATIO, POC: 1.2

## 2022-07-05 PROCEDURE — 99212 OFFICE O/P EST SF 10 MIN: CPT

## 2022-07-05 PROCEDURE — 85610 PROTHROMBIN TIME: CPT

## 2022-07-05 NOTE — PROGRESS NOTES
Ms. Phyllis Johnson is a 46 y.o. y/o female with history of DVT, PE in about 2010. She presents today for anticoagulation monitoring and adjustment. Pertinent PMH: DVT assoc with PICC line  Hx of multiple sclerosis,     Patient Reported Findings:  Yes     No  []   [x]       Patient verifies current dosing regimen as listed---> uses AVS --> confirms dose --> does not know dose, but takes what RN gives her but then states that she might have taken 5 mg daily but took old dose. Does not know dose, uses AVS but AVS ran out last week so she is unsure after that --> states that aide followed avs for doses --> spoke with prasanth HEWITT, 2.5 on Sun, Tues and Thurs and 5 mg all other days of the week---> need to call Nicanor Kenyonmachen     []   [x]       S/S bleeding/bruising/swelling/SOB -denies   []   [x]       Blood in urine or stool - denies   []   [x]       Procedures scheduled in the future at this time  ---> none upcoming   []   [x]       Missed Dose -3 days last week and then 1.25mg daily since    []   [x]       Extra Dose - denies  []   [x]       Change in medications-using perc and tylenol prn for pain with new braces- states this will continue---> takes tylenol prn ---> cefpodoxil finished several days ago, had several tylenol --> no more tylenol, no other changes in meds -->has not been taking sea rivero for a while, unclear when --> cefpodoxime from ED, no interaction---> doxy started 5/27- done today---> flagyl, ceftriaxone, and vanco she states until Friday    []   [x]       Change in health/diet/appetite Normal vegetable is about twice a week and generally a low vitamin K. When she has broccoli it would be in a stir truong. May have a salad only once a month.---> had vegetables, no significant vit k ---> had greens twice since last visit. States that she had done a detox with tea (haven and turmeric tea)  had greens 2-3 times in the past week, no NVD --> no changes, greens 2-3 times, no NVD --> no vomiting.  No greens d/t being ill. ---> had greens 3x this week --> had greens a few times, no V/D --> tried to have more vit k to help INR ---> states had more greens, diarrhea last week, no NV --> had green beans this weekend. Plans to have greens this week --> no changes--->states more greens    []   [x]       Change in alcohol use adamant that she did not drink alcohol while in [place] --> states now that she had one glass of alcohol in [facility]. none recently --> denies---> had a large mixed drink in last week---> denies    []   [x]       Change in activity  []   [x]       Hospital admission   [x]   []       Emergency department visit- at [facility] 5/13-5/14 for UTI. INR 2. 9. was prescribed cefpodoxime. Was given warfarin 2.5 mg in hospital      5/21- aniket bui   5/24-, was told to take 1.5 pills of warfarin and recheck INR dt INR of 1.4  []   [x]       Other complaints   Clinical Outcomes:  Yes     No  []   [x]       Major bleeding event  []   [x]       Thromboembolic event    Duration of warfarin therapy: Indefinitely  INR Range:  2.0-3.0     Continues to miss apts and miss doses. Pt rarely offers up any information to determine cause of INR fluctuations. Ivan Knight RN fills [employer]. Will need to call and clarify what is going on. [phone] 7/5 and [facility] asking for return call to discuss. ---> called back and provided with dosing. INR is 1.2 today   Pt has been non-compliant. Unclear what patient has been taking/what is going on with her abx. States she is done with them all Friday. Pt held 3 doses then states took 1.25mg since Friday 7/1. Dt patient's non-compliance and issues with transportation and continuous abx, it is difficult to determine dosing as unclear what patient has actually been taking/has been given. Take 2.5mg daily   Encouraged to maintain a consistency of vegetables/salads.    Recheck INR in 1 week, 7/11 dt transportation     **consent form signed 10/27/2021    Referring PCP is  Pascual Ramsay FAX results  INR (no units)   Date Value   2022 5.12 (HH)   10/05/2020 8.93 (HH)   2020 3.22 (H)   2020 2.16 (H)     INR,(POC) (no units)   Date Value   2022 1.2   2022 2.1   2022 2.5   2022 5   For Pharmacy Admin Tracking Only     Intervention Detail: Adherence Monitorin and Dose Adjustment: 1, reason: Therapy Optimization   Total # of Interventions Recommended: 2   Total # of Interventions Accepted: 2   Time Spent (min): 15

## 2022-07-05 NOTE — TELEPHONE ENCOUNTER
Pt called to cancel tonight's apt, only had transportation for this morning at 10:30. Put in at 11am.     Patient has not shown up yet this morning. Called to explain we were fitting her in and we do not have any apts left for this morning. LVM.     Vinay Polo, PharmD, [facility]

## 2022-07-08 ENCOUNTER — TELEPHONE (OUTPATIENT)
Dept: PHARMACY | Age: 42
End: 2022-07-08

## 2022-07-08 NOTE — TELEPHONE ENCOUNTER
----- Message from Johnny Flores sent at 7/8/2022 12:55 PM EDT -----  Regarding: new antibiotic  Contact: patient  Please call patient back on fluconazole, 1 tablet per day x 7 days. [phone] and spoke with patient. Instructed patient to take 1.25mg today and Sunday and 2.5mg all other days as INR was only 1.2 on 7/5.     RTC: 7/11    Colletta Bevels, LukaszD

## 2022-07-12 ENCOUNTER — HOSPITAL ENCOUNTER (OUTPATIENT)
Dept: WOUND CARE | Age: 42
Discharge: HOME | End: 2022-07-12
Payer: MEDICAID

## 2022-07-12 ENCOUNTER — ANTI-COAG VISIT (OUTPATIENT)
Dept: PHARMACY | Age: 42
End: 2022-07-12
Payer: MEDICAID

## 2022-07-12 VITALS
WEIGHT: 96.8 LBS | DIASTOLIC BLOOD PRESSURE: 55 MMHG | BODY MASS INDEX: 17.7 KG/M2 | SYSTOLIC BLOOD PRESSURE: 97 MMHG | HEART RATE: 108 BPM

## 2022-07-12 DIAGNOSIS — Z29.9 DVT PROPHYLAXIS: Primary | ICD-10-CM

## 2022-07-12 DIAGNOSIS — L89.154 SACRAL DECUBITUS ULCER, STAGE IV (HCC): Primary | ICD-10-CM

## 2022-07-12 DIAGNOSIS — I26.99 PULMONARY EMBOLISM, UNSPECIFIED CHRONICITY, UNSPECIFIED PULMONARY EMBOLISM TYPE, UNSPECIFIED WHETHER ACUTE COR PULMONALE PRESENT (HCC): ICD-10-CM

## 2022-07-12 DIAGNOSIS — Z79.01 LONG TERM (CURRENT) USE OF ANTICOAGULANTS: ICD-10-CM

## 2022-07-12 LAB — INTERNATIONAL NORMALIZATION RATIO, POC: 1.1

## 2022-07-12 PROCEDURE — 99212 OFFICE O/P EST SF 10 MIN: CPT

## 2022-07-12 PROCEDURE — 11042 DBRDMT SUBQ TIS 1ST 20SQCM/<: CPT

## 2022-07-12 PROCEDURE — 11042 DBRDMT SUBQ TIS 1ST 20SQCM/<: CPT | Performed by: SURGERY

## 2022-07-12 PROCEDURE — 85610 PROTHROMBIN TIME: CPT

## 2022-07-12 RX ORDER — BACITRACIN ZINC AND POLYMYXIN B SULFATE 500; 1000 [USP'U]/G; [USP'U]/G
OINTMENT TOPICAL ONCE
Status: CANCELLED | OUTPATIENT
Start: 2022-07-12 | End: 2022-07-12

## 2022-07-12 RX ORDER — LIDOCAINE 40 MG/G
CREAM TOPICAL ONCE
Status: CANCELLED | OUTPATIENT
Start: 2022-07-12 | End: 2022-07-12

## 2022-07-12 RX ORDER — BETAMETHASONE DIPROPIONATE 0.05 %
OINTMENT (GRAM) TOPICAL ONCE
Status: CANCELLED | OUTPATIENT
Start: 2022-07-12 | End: 2022-07-12

## 2022-07-12 RX ORDER — LIDOCAINE HYDROCHLORIDE 20 MG/ML
JELLY TOPICAL ONCE
Status: CANCELLED | OUTPATIENT
Start: 2022-07-12 | End: 2022-07-12

## 2022-07-12 RX ORDER — LIDOCAINE 50 MG/G
OINTMENT TOPICAL ONCE
Status: CANCELLED | OUTPATIENT
Start: 2022-07-12 | End: 2022-07-12

## 2022-07-12 RX ORDER — LIDOCAINE 40 MG/G
CREAM TOPICAL ONCE
Status: DISCONTINUED | OUTPATIENT
Start: 2022-07-12 | End: 2022-07-13 | Stop reason: HOSPADM

## 2022-07-12 RX ORDER — BACITRACIN, NEOMYCIN, POLYMYXIN B 400; 3.5; 5 [USP'U]/G; MG/G; [USP'U]/G
OINTMENT TOPICAL ONCE
Status: CANCELLED | OUTPATIENT
Start: 2022-07-12 | End: 2022-07-12

## 2022-07-12 RX ORDER — GINSENG 100 MG
CAPSULE ORAL ONCE
Status: CANCELLED | OUTPATIENT
Start: 2022-07-12 | End: 2022-07-12

## 2022-07-12 RX ORDER — GENTAMICIN SULFATE 1 MG/G
OINTMENT TOPICAL ONCE
Status: CANCELLED | OUTPATIENT
Start: 2022-07-12 | End: 2022-07-12

## 2022-07-12 RX ORDER — CLOBETASOL PROPIONATE 0.5 MG/G
OINTMENT TOPICAL ONCE
Status: CANCELLED | OUTPATIENT
Start: 2022-07-12 | End: 2022-07-12

## 2022-07-12 RX ORDER — LIDOCAINE HYDROCHLORIDE 40 MG/ML
SOLUTION TOPICAL ONCE
Status: CANCELLED | OUTPATIENT
Start: 2022-07-12 | End: 2022-07-12

## 2022-07-12 NOTE — PROGRESS NOTES
Tamie Tamayo  AGE: 46 y.o. GENDER: female  : 1980  TODAY'S DATE:  2022    Chief Complaint   Patient presents with    Wound Check     sacrum  F/U        HISTORY of PRESENT ILLNESS HPI     Tamie Tamayo is a 46 y.o. female who presents today for wound evaluation. History of Wound: Left ischial and sacral ulcers  Wound Pain:  mild  Severity:  3 / 10   Wound Type:  pressure  Modifying Factors:  none  Associated Signs/Symptoms:  none    Procedure Note    Performed by: Roverto Hcetor MD    Consent obtained: Yes    Time out taken:  Yes    Pain Control: Anesthetic  Anesthetic: 4% Lidocaine Cream     Debridement:Excisional Debridement    Using curette the wound was sharply debrided    down through and including the removal of subcutaneous tissue.         Devitalized Tissue Debrided:  necrotic/eschar    Pre Debridement Measurements:  Are located in the Wound Documentation Flow Sheet    Wound #: 1 and 2     Post  Debridement Measurements:  Wound 22 Sacrum #2 (Active)   Wound Image   22 1102   Wound Etiology Pressure Stage 3 22 1122   Wound Cleansed Cleansed with saline 22 1126   Dressing/Treatment Alginate;Dry dressing 22 1010   Wound Length (cm) 1.5 cm 22 0951   Wound Width (cm) 1 cm 22 0951   Wound Depth (cm) 0.8 cm 22 0951   Wound Surface Area (cm^2) 1.5 cm^2 22 0951   Change in Wound Size % (l*w) -26.05 22 0951   Wound Volume (cm^3) 1.2 cm^3 22 0951   Wound Healing % -152 22 0951   Post-Procedure Length (cm) 2 cm 22 1126   Post-Procedure Width (cm) 2.3 cm 22 1126   Post-Procedure Depth (cm) 1 cm 22 1126   Post-Procedure Surface Area (cm^2) 4.6 cm^2 22 1126   Post-Procedure Volume (cm^3) 4.6 cm^3 22 1126   Wound Assessment Bleeding 22 1126   Drainage Amount Moderate 22 1126   Drainage Description Serosanguinous 22 1122   Odor None 22 1122   Merlyn-wound Assessment Dry/flaky clean but did require debridement. We will change to Aquacel rope dressing changes daily. Follow-up in the wound center in 2 weeks.     Anastasiya Gabriel MD, M.D.  7/12/2022

## 2022-07-12 NOTE — PROGRESS NOTES
[address] [facility]:      [address] f: [phone] f: [phone] p: [phone] [email or web address]     Ordering Center: [address]  [facility] [address]  [phone]  Dept: [phone]   Fax# [phone]    Patient Information:      Amy [address] [facility] [address] [place] [address]   [phone]   : 1980  AGE: 46 y.o. GENDER: female   TODAYS DATE:  2022    Insurance:      PRIMARY INSURANCE:  Plan: MEDICAID [facility] DEPT OF JOB  Coverage: [place]ICA[place]  Effective Date: 2015  Group Number: [unfilled]  Subscriber Number: [id number] - (Medicaid)    Payor/Plan Subscr  Sex Relation Sub. Ins. ID Effective Group Num   1.  [place] -* DEMARCO SCHWARZ 1980 Female Self [id number] 1/1/15                                    [address]         Patient Wound Information:     Additional ICD-10 Codes:     Patient Active Problem List   Diagnosis Code    DVT prophylaxis Z29.9    Pulmonary emboli ([address] I26.99    Long term (current) use of anticoagulants Z79.01    Sacral decubitus ulcer, stage IV ([address] L89.154       WOUNDS REQUIRING DRESSING SUPPLIES:     Wound 22 Sacrum #2 (Active)   Wound Image   22 1102   Wound Etiology Pressure Stage 3 22 1122   Wound Cleansed Cleansed with saline 22 1126   Dressing/Treatment Alginate;Dry dressing 22 1010   Wound Length (cm) 1 cm 22 1122   Wound Width (cm) 1 cm 22 1122   Wound Depth (cm) 0.8 cm 22 1122   Wound Surface Area (cm^2) 1 cm^2 22 1122   Change in Wound Size % (l*w) 15.97 22 1122   Wound Volume (cm^3) 0.8 cm^3 222   Wound Healing % -68 22   Post-Procedure Length (cm) 1 cm 22   Post-Procedure Width (cm) 1 cm 22   Post-Procedure Depth (cm) 0.8 cm 22   Post-Procedure Surface Area (cm^2) 1 cm^2 22   Post-Procedure Volume Tape 3\"  [] Medipore Tape 3\"  [x] Saline  [] Skin Prep   [] Adhesive Remover   [] Cotton Tip Applicators  [] Tubular Stocking   [] Size E  [] Size G  [] Other:    Patient currently being seen by Home Health: [] Yes   [x] No    Duration for needed supplies:  [x]15  []30  []60  []90 Days    Provider Information:      PROVIDER'S NAME/NPI  Reynaldo Stoddard MD NPI: [id number]   I give permission to coordinate the care for this patient

## 2022-07-12 NOTE — PROGRESS NOTES
Ms. Marta Nickerson is a 46 y.o. y/o female with history of DVT, PE in about 2010. She presents today for anticoagulation monitoring and adjustment. Pertinent PMH: DVT assoc with PICC line  Hx of multiple sclerosis,     Patient Reported Findings:  Yes     No  []   [x]       Patient verifies current dosing regimen as listed---> uses AVS --> confirms dose --> does not know dose, but takes what RN gives her but then states that she might have taken 5 mg daily but took old dose. Does not know dose, uses AVS but AVS ran out last week so she is unsure after that --> states that aide followed avs for doses --> spoke with prasanth HEWITT, 2.5 on Sun, Tues and Thurs and 5 mg all other days of the week---> need to call Ashlee Eric     []   [x]       S/S bleeding/bruising/swelling/SOB -denies   []   [x]       Blood in urine or stool - denies   []   [x]       Procedures scheduled in the future at this time  ---> none upcoming   []   [x]       Missed Dose -3 days last week and then 1.25mg daily since    []   [x]       Extra Dose - denies  [x]   []       Change in medications-using perc and tylenol prn for pain with new braces- states this will continue---> takes tylenol prn ---> cefpodoxil finished several days ago, had several tylenol --> no more tylenol, no other changes in meds -->has not been taking sea rivero for a while, unclear when --> cefpodoxime from ED, no interaction---> doxy started 5/27- done today---> flagyl, ceftriaxone, and vanco she states until Friday --> finished all abx except 1 week of fluconazole    []   [x]       Change in health/diet/appetite Normal vegetable is about twice a week and generally a low vitamin K. When she has broccoli it would be in a stir truong. May have a salad only once a month.---> had vegetables, no significant vit k ---> had greens twice since last visit.  States that she had done a detox with tea (haven and turmeric tea)  had greens 2-3 times in the past week, no NVD --> no changes, greens 2-3 times, no NVD --> no vomiting. No greens d/t being ill. ---> had greens 3x this week --> had greens a few times, no V/D --> tried to have more vit k to help INR ---> states had more greens, diarrhea last week, no NV --> had green beans this weekend. Plans to have greens this week --> no changes--->states more greens    []   [x]       Change in alcohol use adamant that she did not drink alcohol while in [place] --> states now that she had one glass of alcohol in [place]. none recently --> denies---> had a large mixed drink in last week---> denies    []   [x]       Change in activity  []   [x]       Hospital admission   [x]   []       Emergency department visit- at [facility] 5/13-5/14 for UTI. INR 2. 9. was prescribed cefpodoxime. Was given warfarin 2.5 mg in hospital      5/21- aniket bui   5/24-, was told to take 1.5 pills of warfarin and recheck INR dt INR of 1.4    7/5 for UTI. Was prescribed   []   [x]       Other complaints   Clinical Outcomes:  Yes     No  []   [x]       Major bleeding event  []   [x]       Thromboembolic event    Duration of warfarin therapy: Indefinitely  INR Range:  2.0-3.0     Continues to miss apts and miss doses. Pt rarely offers up any information to determine cause of INR fluctuations. Jenna Campbell, RN fills [facility]. Will need to call and clarify what is going on. [phone] 7/5 and [facility] asking for return call to discuss. ---> called back and provided with dosing. INR is 1.1 today   Pt has been non-compliant. Unclear what patient has been taking/what is going on with her abx. --> called and spoke to Jenna Campbell, she states that since Fri pt has been taking 1.25 mg daily (incorrect dosing)   Dt patient's non-compliance and issues with transportation and continuous abx, it is difficult to determine dosing as unclear what patient has actually been taking/has been given.    Take 5 mg tonight and Fri and 2.5 mg all other days of the week   Encouraged to maintain a consistency of vegetables/salads.    Recheck INR in 1 week,     **consent form signed 10/27/2021    Referring PCP is Dr. Sara Londono results  INR (no units)   Date Value   2022 5.12 (HH)   10/05/2020 8.93 (HH)   2020 3.22 (H)   2020 2.16 (H)     INR,(POC) (no units)   Date Value   2022 1.2   2022 2.1   2022 2.5   2022 5     For Pharmacy Admin Tracking Only     Intervention Detail: Adherence Monitorin and Dose Adjustment: 1, reason: Therapy Optimization   Total # of Interventions Recommended: 2   Total # of Interventions Accepted: 2   Time Spent (min): 15

## 2022-07-21 ENCOUNTER — TELEPHONE (OUTPATIENT)
Dept: PHARMACY | Age: 42
End: 2022-07-21

## 2022-07-21 NOTE — TELEPHONE ENCOUNTER
----- Message from Giuseppe Adame sent at 7/21/2022 11:27 AM EDT -----  Pt LVM stating shes cant make it in today, her aide has an appt today. She started Keflex 4 x daily.  Please call pt at [phone]

## 2022-07-21 NOTE — TELEPHONE ENCOUNTER
Pt LVM stating her aide is unable to bring her in for appt today. PT stated shes been prescribed Keflex 4 x daily. Pool message placed.

## 2022-07-22 ENCOUNTER — TELEPHONE (OUTPATIENT)
Dept: PHARMACY | Age: 42
End: 2022-07-22

## 2022-07-22 NOTE — TELEPHONE ENCOUNTER
Noted. Did not call patient as already scheduled to RTC on Mon 7/25. Unable to make dose adjustment as currently dont have effective weekly dose for patient d/t noncompliance.

## 2022-07-26 ENCOUNTER — HOSPITAL ENCOUNTER (OUTPATIENT)
Dept: WOUND CARE | Age: 42
Discharge: HOME | End: 2022-07-26

## 2022-07-26 NOTE — DISCHARGE INSTRUCTIONS
[address], [address]  Telephone: [phone]     Discharge Instructions     Important reminders:     **If you have any signs and symptoms of illness (Cough, fever, congestion, nausea, vomiting, diarrhea, etc.) please call the wound care center prior to your appointment. 1. Increase Protein intake for optimal wound healing  2. No added salt to reduce any swelling  3. If diabetic, maintain good glucose control  4. If you smoke, smoking prohibits wound healing, we ask that you refrain from smoking. 5. When taking antibiotics take the entire prescription as ordered. Do not stop taking until medication is all gone unless otherwise instructed. 6. Exercise as tolerated. 7. Keep weight off wounds and reposition every 2 hours if applicable. 8. If wound(s) is on your lower extremity, elevate legs to the level of the heart or above for 30 minutes 4-5 times a day and/or when sitting. Avoid standing for long periods of time. 9. Do not get wounds wet in bath or shower unless otherwise instructed by your physician. If your wound is on your foot or leg, you may purchase a cast bag. Please ask at the pharmacy. If Vascular testing is ordered, please call [address] [place] [address] [phone]) to schedule. Vascular tests ordered by Wound Care Physicians may take up to 2 hours to complete. Please keep that in mind when scheduling. If Vascular testing is scheduled, please bring supplies to replace your dressing after testing is done. The vascular department does not stock supplies. Wound: Sacrum, Right ischium, Left ischium     With each dressing change, rinse wounds with 0.9% Saline. (May use wound wash or soft contact solution. Both can be purchased at a local drug store). If unable to obtain saline, may use a gentle soap and water.      Dressing care: Sacrum, Left ischium- pack with Alginate ag rope (be sure to loosely pack into deepest parts & tunnels), Kerramax gentle border or 4 x 4's & tape- change every 2 days and as needed for drainage or soilage. Increase protein, continue to drink Myron. Turn & reposition every 1-2 hours and as needed for pressure relief and comfort. Up in chair for meals only. Home Care Agency/Facility:     Your wound-care supplies will be provided by: [address] f: [phone] f: [phone] p: [phone] [email or web address]    Please note, depending on your insurance coverage, you may have out-of-pocket expenses for these supplies. Someone from the company should call you to confirm your order and discuss those potential costs before they ship your products -- please anticipate that call. If your out-of-pocket cost could be substantial, Many companies have financial hardship programs for patients who qualify, so please ask about that if you might need a hand. If you have any questions about your supplies or your potential out-of-pocket costs, or if you need to place an order for a refill of supplies (typically monthly), please call the company directly. Your  is Vinicio Pride     Follow up with Dr Hanna Stroud In 2 week(s) in the wound care center. Wound Care Center Information: Should you experience any significant changes in your wound(s) or have questions about your wound care, [facility] at [phone] Monday  - Thursday 8:00 am - 4:00 pm and Friday 8:00 am - 1:00pm. If you need help with your wound outside these hours and cannot wait until we are again available, contact your PCP or go to the hospital emergency room. PLEASE NOTE: IF YOU ARE UNABLE TO OBTAIN WOUND SUPPLIES, CONTINUE TO USE THE SUPPLIES YOU HAVE AVAILABLE UNTIL YOU ARE ABLE TO REACH US. IT IS MOST IMPORTANT TO KEEP THE WOUND COVERED AT ALL TIMES. Patient Experience     Thank you for trusting us with your care.   You may receive a survey from a company called [employer] asking for [employer]. We would appreciate it if you took a few minutes to share your experience. Your input is very valuable to us.

## 2022-07-28 NOTE — TELEPHONE ENCOUNTER
Patient called to reschedule. States she has been working all week on getting a ride. Wants to come in tomorrow between 10a-11a. Explained we had only 3 appts available, 7am's and 2:45p. Patient states her  is very particular about the time she is available and can only bring the patient in between 10a and 1p. Patient will call back.

## 2022-07-29 ENCOUNTER — ANTI-COAG VISIT (OUTPATIENT)
Dept: PHARMACY | Age: 42
End: 2022-07-29
Payer: MEDICAID

## 2022-07-29 DIAGNOSIS — I26.09 OTHER ACUTE PULMONARY EMBOLISM WITH ACUTE COR PULMONALE (HCC): ICD-10-CM

## 2022-07-29 DIAGNOSIS — Z29.9 DVT PROPHYLAXIS: Primary | ICD-10-CM

## 2022-07-29 DIAGNOSIS — Z79.01 LONG TERM (CURRENT) USE OF ANTICOAGULANTS: ICD-10-CM

## 2022-07-29 LAB — INTERNATIONAL NORMALIZATION RATIO, POC: 1.5

## 2022-07-29 PROCEDURE — 99211 OFF/OP EST MAY X REQ PHY/QHP: CPT

## 2022-07-29 PROCEDURE — 85610 PROTHROMBIN TIME: CPT

## 2022-07-29 NOTE — DISCHARGE INSTRUCTIONS
[address], [address]  Telephone: [phone]     Discharge Instructions     Important reminders:     **If you have any signs and symptoms of illness (Cough, fever, congestion, nausea, vomiting, diarrhea, etc.) please call the wound care center prior to your appointment. 1. Increase Protein intake for optimal wound healing  2. No added salt to reduce any swelling  3. If diabetic, maintain good glucose control  4. If you smoke, smoking prohibits wound healing, we ask that you refrain from smoking. 5. When taking antibiotics take the entire prescription as ordered. Do not stop taking until medication is all gone unless otherwise instructed. 6. Exercise as tolerated. 7. Keep weight off wounds and reposition every 2 hours if applicable. 8. If wound(s) is on your lower extremity, elevate legs to the level of the heart or above for 30 minutes 4-5 times a day and/or when sitting. Avoid standing for long periods of time. 9. Do not get wounds wet in bath or shower unless otherwise instructed by your physician. If your wound is on your foot or leg, you may purchase a cast bag. Please ask at the pharmacy. If Vascular testing is ordered, please call [address] [place] [address] [phone] to schedule. Vascular tests ordered by Wound Care Physicians may take up to 2 hours to complete. Please keep that in mind when scheduling. If Vascular testing is scheduled, please bring supplies to replace your dressing after testing is done. The vascular department does not stock supplies. Wound: Sacrum, Right ischium, Left ischium     With each dressing change, rinse wounds with 0.9% Saline. (May use wound wash or soft contact solution. Both can be purchased at a local drug store). If unable to obtain saline, may use a gentle soap and water.      Dressing care: Sacrum, Left ischium- pack with Alginate ag rope (be sure to loosely pack into deepest parts & tunnels), Kerramax gentle border or 4 x 4's & tape- change every 2 days and as needed for drainage or soilage. Increase protein, continue to drink Myron. Turn & reposition every 1-2 hours and as needed for pressure relief and comfort. Up in chair for meals only. Home Care Agency/Facility:     Your wound-care supplies will be provided by: [address] f: [phone] f: [phone] p: [phone] [email or web address]    Please note, depending on your insurance coverage, you may have out-of-pocket expenses for these supplies. Someone from the company should call you to confirm your order and discuss those potential costs before they ship your products -- please anticipate that call. If your out-of-pocket cost could be substantial, Many companies have financial hardship programs for patients who qualify, so please ask about that if you might need a hand. If you have any questions about your supplies or your potential out-of-pocket costs, or if you need to place an order for a refill of supplies (typically monthly), please call the company directly. Your  is Harvey Freire     Follow up with Dr Trina Dolan In 2 week(s) in the wound care center. Wound Care Center Information: Should you experience any significant changes in your wound(s) or have questions about your wound care, please contact the [employer] [facility] at [phone] Monday  - Thursday 8:00 am - 4:00 pm and Friday 8:00 am - 1:00pm. If you need help with your wound outside these hours and cannot wait until we are again available, contact your PCP or go to the hospital emergency room. PLEASE NOTE: IF YOU ARE UNABLE TO OBTAIN WOUND SUPPLIES, CONTINUE TO USE THE SUPPLIES YOU HAVE AVAILABLE UNTIL YOU ARE ABLE TO REACH US. IT IS MOST IMPORTANT TO KEEP THE WOUND COVERED AT ALL TIMES. Patient Experience     Thank you for trusting us with your care.   You may receive a survey from a company called [employer] asking for [employer]. We would appreciate it if you took a few minutes to share your experience.   Your input is very valuable to us

## 2022-07-29 NOTE — PROGRESS NOTES
Ms. [place] Simon is a 46 y.o. y/o female with history of DVT, PE in about 2010. She presents today for anticoagulation monitoring and adjustment. Pertinent PMH: DVT assoc with PICC line  Hx of multiple sclerosis,     Patient Reported Findings:  Yes     No  []   [x]       Patient verifies current dosing regimen as listed---> uses AVS --> confirms dose --> does not know dose, but takes what RN gives her but then states that she might have taken 5 mg daily but took old dose. Does not know dose, uses AVS but AVS ran out last week so she is unsure after that --> states that aide followed avs for doses --> spoke with prasanth HEWITT, 2.5 on Sun, Tues and Thurs and 5 mg all other days of the week---> need to call Celso Vaz     []   [x]       S/S bleeding/bruising/swelling/SOB -denies   []   [x]       Blood in urine or stool - denies   []   [x]       Procedures scheduled in the future at this time  ---> none upcoming   []   [x]       Missed Dose -3 days last week and then 1.25mg daily since    []   [x]       Extra Dose - denies  [x]   []       Change in medications-using perc and tylenol prn for pain with new braces- states this will continue---> takes tylenol prn ---> cefpodoxil finished several days ago, had several tylenol --> no more tylenol, no other changes in meds -->has not been taking sea rivero for a while, unclear when --> cefpodoxime from ED, no interaction---> doxy started 5/27- done today---> flagyl, ceftriaxone, and vanco she states until Friday --> finished all abx except 1 week of fluconazole --> unsure if patient is still taking cipro, new multivit and will restart biotin soon. []   [x]       Change in health/diet/appetite Normal vegetable is about twice a week and generally a low vitamin K. When she has broccoli it would be in a stir truong. May have a salad only once a month.---> had vegetables, no significant vit k ---> had greens twice since last visit.  States that she had done a detox with tea (haven and turmeric tea)  had greens 2-3 times in the past week, no NVD --> no changes, greens 2-3 times, no NVD --> no vomiting. No greens d/t being ill. ---> had greens 3x this week --> had greens a few times, no V/D --> tried to have more vit k to help INR ---> states had more greens, diarrhea last week, no NV --> had green beans this weekend. Plans to have greens this week --> no changes--->states more greens --> no changes, no NVD  []   [x]       Change in alcohol use adamant that she did not drink alcohol while in [place] --> states now that she had one glass of alcohol in [facility]. none recently --> denies---> had a large mixed drink in last week---> denies    []   [x]       Change in activity  []   [x]       Hospital admission   [x]   []       Emergency department visit- at [facility] 5/13-5/14 for UTI. INR 2. 9. was prescribed cefpodoxime. Was given warfarin 2.5 mg in hospital      5/21- [facility]   5/24-, was told to take 1.5 pills of warfarin and recheck INR dt INR of 1.4    7/5 for UTI. Was prescribed   []   [x]       Other complaints   Clinical Outcomes:  Yes     No  []   [x]       Major bleeding event  []   [x]       Thromboembolic event    Duration of warfarin therapy: Indefinitely  INR Range:  2.0-3.0     Continues to miss apts and miss doses. Pt rarely offers up any information to determine cause of INR fluctuations. Celso Vaz RN fills [facility]. Will need to call and clarify what is going on. [phone] 7/5 and M asking for return call to discuss. ---> called back and provided with dosing. INR is 1.5  today after dose increase at last visit and patient missing dose earlier this week. Pt has been non-compliant. Unclear what patient has been taking/what is going on with her abx. --> called and spoke to Celso Vaz, she states that since Fri pt has been taking 1.25 mg daily (incorrect dosing) --> patient states she takes what the nurse gives her and is unsure what dose it.   Take 5 mg

## 2022-08-04 ENCOUNTER — HOSPITAL ENCOUNTER (OUTPATIENT)
Dept: WOUND CARE | Age: 42
Discharge: HOME | End: 2022-08-04
Payer: MEDICAID

## 2022-08-04 VITALS — HEART RATE: 93 BPM | DIASTOLIC BLOOD PRESSURE: 61 MMHG | SYSTOLIC BLOOD PRESSURE: 87 MMHG | TEMPERATURE: 97.1 F

## 2022-08-04 DIAGNOSIS — Z79.01 LONG TERM (CURRENT) USE OF ANTICOAGULANTS: ICD-10-CM

## 2022-08-04 DIAGNOSIS — L89.154 SACRAL DECUBITUS ULCER, STAGE IV (HCC): Primary | ICD-10-CM

## 2022-08-04 PROCEDURE — 11042 DBRDMT SUBQ TIS 1ST 20SQCM/<: CPT | Performed by: NURSE PRACTITIONER

## 2022-08-04 PROCEDURE — 11042 DBRDMT SUBQ TIS 1ST 20SQCM/<: CPT

## 2022-08-04 RX ORDER — BACITRACIN, NEOMYCIN, POLYMYXIN B 400; 3.5; 5 [USP'U]/G; MG/G; [USP'U]/G
OINTMENT TOPICAL ONCE
Status: CANCELLED | OUTPATIENT
Start: 2022-08-04 | End: 2022-08-04

## 2022-08-04 RX ORDER — CLOBETASOL PROPIONATE 0.5 MG/G
OINTMENT TOPICAL ONCE
Status: CANCELLED | OUTPATIENT
Start: 2022-08-04 | End: 2022-08-04

## 2022-08-04 RX ORDER — LIDOCAINE 40 MG/G
CREAM TOPICAL ONCE
Status: DISCONTINUED | OUTPATIENT
Start: 2022-08-04 | End: 2022-08-05 | Stop reason: HOSPADM

## 2022-08-04 RX ORDER — BETAMETHASONE DIPROPIONATE 0.05 %
OINTMENT (GRAM) TOPICAL ONCE
Status: CANCELLED | OUTPATIENT
Start: 2022-08-04 | End: 2022-08-04

## 2022-08-04 RX ORDER — LIDOCAINE HYDROCHLORIDE 20 MG/ML
JELLY TOPICAL ONCE
Status: CANCELLED | OUTPATIENT
Start: 2022-08-04 | End: 2022-08-04

## 2022-08-04 RX ORDER — BACITRACIN ZINC AND POLYMYXIN B SULFATE 500; 1000 [USP'U]/G; [USP'U]/G
OINTMENT TOPICAL ONCE
Status: CANCELLED | OUTPATIENT
Start: 2022-08-04 | End: 2022-08-04

## 2022-08-04 RX ORDER — GENTAMICIN SULFATE 1 MG/G
OINTMENT TOPICAL ONCE
Status: CANCELLED | OUTPATIENT
Start: 2022-08-04 | End: 2022-08-04

## 2022-08-04 RX ORDER — LIDOCAINE 50 MG/G
OINTMENT TOPICAL ONCE
Status: CANCELLED | OUTPATIENT
Start: 2022-08-04 | End: 2022-08-04

## 2022-08-04 RX ORDER — LIDOCAINE HYDROCHLORIDE 40 MG/ML
SOLUTION TOPICAL ONCE
Status: CANCELLED | OUTPATIENT
Start: 2022-08-04 | End: 2022-08-04

## 2022-08-04 RX ORDER — LIDOCAINE 40 MG/G
CREAM TOPICAL ONCE
Status: CANCELLED | OUTPATIENT
Start: 2022-08-04 | End: 2022-08-04

## 2022-08-04 RX ORDER — GINSENG 100 MG
CAPSULE ORAL ONCE
Status: CANCELLED | OUTPATIENT
Start: 2022-08-04 | End: 2022-08-04

## 2022-08-05 NOTE — PROGRESS NOTES
Jocelyne   Progress Note and Procedure Note      Toya Corrales  AGE: 46 y.o. GENDER: female  : 1980  TODAY'S DATE:  2022    Subjective:     Chief Complaint   Patient presents with    Wound Check     Ischium and sacrum         HISTORY of PRESENT ILLNESS HPI     Toya Corrales is a 46 y.o. female who presents today for wound evaluation. History of Wound: Pressure ulcerations  Wound Pain:  mild  Severity:  3 / 10  Wound Type:  pressure  Modifying Factors:  none  Associated Signs/Symptoms:  none        PAST MEDICAL HISTORY        Diagnosis Date    Anemia     DVT (deep venous thrombosis) (HCC)     MRSA (methicillin resistant staph aureus) culture positive 2017    urine    MS (multiple sclerosis) (HCC)     Pulmonary embolism (HCC)     Sacral decubitus ulcer, stage IV ([facility] [address] 2022       PAST SURGICAL HISTORY    Past Surgical History:   Procedure Laterality Date    FINGER AMPUTATION      TUNNELED VENOUS PORT PLACEMENT         FAMILY HISTORY    History reviewed. No pertinent family history.     SOCIAL HISTORY    Social History     Tobacco Use    Smoking status: Never    Smokeless tobacco: Never   Substance Use Topics    Alcohol use: Yes     Comment: socially    Drug use: No       ALLERGIES    Allergies   Allergen Reactions    Ambien [Zolpidem Tartrate]     Ampicillin     Bactrim     Morphine     Risperidone And Related     Vicodin [Hydrocodone-Acetaminophen]     Zoloft        MEDICATIONS    Current Outpatient Medications on File Prior to Encounter   Medication Sig Dispense Refill    alendronate (FOSAMAX) 70 MG tablet Take 70 mg by mouth every 7 days      MYRBETRIQ 50 MG TB24       pantoprazole (PROTONIX) 40 MG tablet Take 1 tablet by mouth daily      montelukast (SINGULAIR) 10 MG tablet Take 10 mg by mouth nightly      magnesium oxide (MAG-OX) 400 MG tablet Take 400 mg by mouth every evening      vitamin D3 (CHOLECALCIFEROL) 125 MCG (5000 UT) TABS tablet Take 1 tablet by mouth daily      simethicone (MYLICON) 80 MG chewable tablet Take 80 mg by mouth daily as needed for Flatulence      warfarin (COUMADIN) 2.5 MG tablet Take 2.5 mg by mouth See Admin Instructions Unsure of dose sequence      solifenacin (VESICARE) 5 MG tablet Take 5 mg by mouth daily      trospium (SANCTURA) 20 MG tablet Take 20 mg by mouth 2 times daily      cetirizine (ZYRTEC) 10 MG tablet Take 10 mg by mouth daily. oxyCODONE-acetaminophen (PERCOCET) 5-325 MG per tablet Take 1 tablet by mouth every 6 hours as needed. baclofen (LIORESAL) 10 MG tablet Take 10 mg by mouth 3 times daily. calcium-vitamin D (OSCAL-500) 500-200 MG-UNIT per tablet Take 1 tablet by mouth daily. ferrous sulfate 325 (65 FE) MG tablet Take 325 mg by mouth daily (with breakfast). famotidine (PEPCID) 20 MG tablet Take 40 mg by mouth daily       potassium chloride SA (K-DUR;KLOR-CON) 20 MEQ tablet Take 20 mEq by mouth daily       therapeutic multivitamin-minerals (THERAGRAN-M) tablet Take 1 tablet by mouth daily. No current facility-administered medications on file prior to encounter. REVIEW OF SYSTEMS    Pertinent items are noted in HPI.       Objective:      BP 87/61   Pulse 93   Temp 97.1 °F (36.2 °C) (Infrared)     PHYSICAL EXAM    General Appearance: alert and oriented to person, place and time and frail-appearing  Skin: warm and dry, no rash or erythema  Head: normocephalic and atraumatic  Eyes: pupils equal, round, and reactive to light  Pulmonary/Chest:  normal air movement, no respiratory distress  Cardiovascular: normal rate and regular rhythm      Assessment:     Patient Active Problem List   Diagnosis    DVT prophylaxis    Pulmonary emboli ([address]    Long term (current) use of anticoagulants    Sacral decubitus ulcer, stage IV ([address]       Procedure Note    Performed by: LUBA Vora CNP    Consent obtained: Yes    Time out taken:  Yes    Pain Control:   4% Lidocaine    Debridement:Excisional Debridement    Using forceps the wound was sharply debrided    down through and including the removal of epidermis, dermis, and subcutaneous tissue.         Devitalized Tissue Debrided:  fibrin, biofilm, and slough    Pre Debridement Measurements:  Are located in the Wound Documentation Flow Sheet    Wound #: 3     Post  Debridement Measurements:  Wound 05/03/22 Sacrum #2 (Active)   Wound Image   05/03/22 1102   Wound Etiology Pressure Stage 3 08/04/22 1554   Wound Cleansed Cleansed with saline 08/04/22 1554   Dressing/Treatment Alginate;Dry dressing 05/31/22 1010   Wound Length (cm) 1.7 cm 08/04/22 1554   Wound Width (cm) 1 cm 08/04/22 1554   Wound Depth (cm) 0.8 cm 08/04/22 1554   Wound Surface Area (cm^2) 1.7 cm^2 08/04/22 1554   Change in Wound Size % (l*w) -42.86 08/04/22 1554   Wound Volume (cm^3) 1.36 cm^3 08/04/22 1554   Wound Healing % -186 08/04/22 1554   Post-Procedure Length (cm) 1 cm 07/12/22 1126   Post-Procedure Width (cm) 1 cm 07/12/22 1126   Post-Procedure Depth (cm) 0.8 cm 07/12/22 1126   Post-Procedure Surface Area (cm^2) 1 cm^2 07/12/22 1126   Post-Procedure Volume (cm^3) 0.8 cm^3 07/12/22 1126   Wound Assessment Bleeding 08/04/22 1554   Drainage Amount Moderate 08/04/22 1554   Drainage Description Serosanguinous 08/04/22 1554   Odor None 08/04/22 1554   Merlyn-wound Assessment Intact 08/04/22 1554   Margins Defined edges 08/04/22 1554   Number of days: 94       Wound 05/03/22 Ischium Left #3 (Active)   Wound Image   05/03/22 1105   Wound Etiology Pressure Stage 4 08/04/22 1554   Wound Cleansed Cleansed with saline 08/04/22 1554   Dressing/Treatment Alginate;Dry dressing 05/31/22 1010   Wound Length (cm) 1.2 cm 08/04/22 1554   Wound Width (cm) 1.1 cm 08/04/22 1554   Wound Depth (cm) 1.5 cm 08/04/22 1554   Wound Surface Area (cm^2) 1.32 cm^2 08/04/22 1554   Change in Wound Size % (l*w) 96.72 08/04/22 1554   Wound Volume (cm^3) 1.98 cm^3 08/04/22 1554   Wound Healing % 88 08/04/22 1554   Post-Procedure Length (cm) 2.1 cm 08/04/22 1637   Post-Procedure Width (cm) 2.4 cm 08/04/22 1637   Post-Procedure Depth (cm) 1.1 cm 08/04/22 1637   Post-Procedure Surface Area (cm^2) 5.04 cm^2 08/04/22 1637   Post-Procedure Volume (cm^3) 5.544 cm^3 08/04/22 1637   Distance Tunneling (cm) 7.5 cm 08/04/22 1554   Tunneling Position ___ O'Clock 1200 08/04/22 1554   Wound Assessment Bleeding 08/04/22 1637   Drainage Amount Moderate 08/04/22 1637   Drainage Description Serosanguinous 08/04/22 1637   Odor None 08/04/22 1554   Merlyn-wound Assessment Intact 08/04/22 1554   Margins Defined edges 08/04/22 1554   Number of days: 94           Total Surface Area Debrided:  5.04 sq cm     Bleeding:  Minimal    Hemostasis Achieved:  by pressure    Procedural Pain:  0  / 10     Post Procedural Pain:  0 / 10     Response to treatment:  Well tolerated by patient. Plan:     The nature of the patient's condition was explained in depth. The patient was informed that their compliance to the treatment plan is paramount to successful healing and prevention of further ulceration and/or infection     Discharge Treatment    Dressing care: Sacrum, Left ischium- pack with Alginate ag rope (be sure to loosely pack into deepest parts & tunnels), Kerramax gentle border or 4 x 4's & tape- change every 2 days and as needed for drainage or soilage. Increase protein, continue to drink Myron. Turn & reposition every 1-2 hours and as needed for pressure relief and comfort. Up in chair for meals only.   Written Patient Discharge Instructions Given            Electronically signed by LUBA Jeter CNP on 8/5/2022 at 10:28 AM

## 2022-08-08 NOTE — PROGRESS NOTES
[address] [facility],[address] Floor:      [address] f: [phone] f: [phone] p: [phone] [email or web address]     Ordering Center: Anna Bowden [address]  [facility] [address]  [phone]  Dept: [phone]   Fax# [phone]    Patient Information:      [address] [facility] [address] [place] [address]   [phone]   : 1980  AGE: 46 y.o. GENDER: female   TODAYS DATE:  2022    Insurance:      PRIMARY INSURANCE:  Plan: MEDICAID [facility] DEPT OF JOB  Coverage: [place]ICA[place]  Effective Date: 2015  Group Number: [unfilled]  Subscriber Number: [id number] - (Medicaid)    Payer/Plan Subscr  Sex Relation Sub. Ins. ID Effective Group Num   1.  MEDICAID [place] -* DEMARCO SCHWARZ 1980 Female Self [id number] 1/1/15                                    [address]         Patient Wound Information:     Additional ICD-10 Codes:     Patient Active Problem List   Diagnosis Code    DVT prophylaxis Z29.9    Pulmonary emboli ([address] I26.99    Long term (current) use of anticoagulants Z79.01    Sacral decubitus ulcer, stage IV ([address] L89.154       WOUNDS REQUIRING DRESSING SUPPLIES:     Wound 22 Sacrum #2 (Active)   Wound Image   22 1102   Wound Etiology Pressure Stage 3 22 1554   Wound Cleansed Cleansed with saline 22 1554   Dressing/Treatment Alginate;Dry dressing 22 1010   Wound Length (cm) 1.7 cm 22 1554   Wound Width (cm) 1 cm 22 1554   Wound Depth (cm) 0.8 cm 22 1554   Wound Surface Area (cm^2) 1.7 cm^2 22 1554   Change in Wound Size % (l*w) -42.86 22 1554   Wound Volume (cm^3) 1.36 cm^3 22 1554   Wound Healing % -186 22 1554   Post-Procedure Length (cm) 1 cm 22 1126   Post-Procedure Width (cm) 1 cm 22   Post-Procedure Depth (cm) 0.8 cm 22   Post-Procedure Surface Area (cm^2) 1 cm^2 22   Post-Procedure Volume (cm^3) 0.8 cm^3 07/12/22 1126   Wound Assessment Bleeding 08/04/22 1554   Drainage Amount Moderate 08/04/22 1554   Drainage Description Serosanguinous 08/04/22 1554   Odor None 08/04/22 1554   Merlyn-wound Assessment Intact 08/04/22 1554   Margins Defined edges 08/04/22 1554   Number of days: 97       Wound 05/03/22 Ischium Left #3 (Active)   Wound Image   05/03/22 1105   Wound Etiology Pressure Stage 4 08/04/22 1554   Wound Cleansed Cleansed with saline 08/04/22 1554   Dressing/Treatment Alginate;Dry dressing 05/31/22 1010   Wound Length (cm) 1.2 cm 08/04/22 1554   Wound Width (cm) 1.1 cm 08/04/22 1554   Wound Depth (cm) 1.5 cm 08/04/22 1554   Wound Surface Area (cm^2) 1.32 cm^2 08/04/22 1554   Change in Wound Size % (l*w) 96.72 08/04/22 1554   Wound Volume (cm^3) 1.98 cm^3 08/04/22 1554   Wound Healing % 88 08/04/22 1554   Post-Procedure Length (cm) 2.1 cm 08/04/22 1637   Post-Procedure Width (cm) 2.4 cm 08/04/22 1637   Post-Procedure Depth (cm) 1.1 cm 08/04/22 1637   Post-Procedure Surface Area (cm^2) 5.04 cm^2 08/04/22 1637   Post-Procedure Volume (cm^3) 5.544 cm^3 08/04/22 1637   Distance Tunneling (cm) 7.5 cm 08/04/22 1554   Tunneling Position ___ O'Clock 1200 08/04/22 1554   Wound Assessment Bleeding 08/04/22 1637   Drainage Amount Moderate 08/04/22 1637   Drainage Description Serosanguinous 08/04/22 1637   Odor None 08/04/22 1554   Merlyn-wound Assessment Intact 08/04/22 1554   Margins Defined edges 08/04/22 1554   Number of days: 97          Supplies Requested :      WOUND #: 2 and 3   PRIMARY DRESSING:    Alginate with silver rope   Cover and Secure with: 4X4 non woven gauze pad  ABD pad  Other Kerramax Gentle Border 6 x 6     FREQUENCY OF DRESSING CHANGES:  Daily    Wound Thickness [x] Full   []Partial       Patient Wound(s) Debrided: [] Yes   [] No    Debridement Date: 8/4/22    Debribement Type: Excisional/Sharp    ADDITIONAL ITEMS:  [] Gloves Small  [x] Gloves Medium [] Gloves Large [] Gloves Siobhan Angel  [] Paper Tape 1\" [] Paper Tape 2\" [] Paper Tape 3\"  [x] Medipore Tape 3\"  [x] Saline  [] Skin Prep   [] Adhesive Remover   [] Cotton Tip Applicators  [] Tubular Stocking   [] Size E  [] Size G  [] Other:    Patient currently being seen by Home Health: [] Yes   [x] No    Quantity of days dispensed:  [x]15  []30  []60  []90 Days    Order valid for 90 days    Provider Information:      PROVIDER'S NAME/NPI  Rayna Lazaro MD NPI: [id number]   I give permission to coordinate the care for this patient

## 2022-08-15 NOTE — DISCHARGE INSTRUCTIONS
tunnels), Kerramax gentle border or 4 x 4's & tape- change every 2 days and as needed for drainage or soilage. Increase protein, continue to drink Myron. Turn & reposition every 1-2 hours and as needed for pressure relief and comfort. Up in chair for meals only. Home Care Agency/Facility:     Your wound-care supplies will be provided by: [address] f: [phone] f: [phone] p: [phone] [email or web address]    Please note, depending on your insurance coverage, you may have out-of-pocket expenses for these supplies. Someone from the company should call you to confirm your order and discuss those potential costs before they ship your products -- please anticipate that call. If your out-of-pocket cost could be substantial, Many companies have financial hardship programs for patients who qualify, so please ask about that if you might need a hand. If you have any questions about your supplies or your potential out-of-pocket costs, or if you need to place an order for a refill of supplies (typically monthly), please call the company directly. Your  is Glen Del Toro     Follow up with Dr Nguyễn Flynn In 2 week(s) in the wound care center. Wound Care Center Information: Should you experience any significant changes in your wound(s) or have questions about your wound care, please contact the [facility] at [phone] Monday  - Thursday 8:00 am - 4:00 pm and Friday 8:00 am - 1:00pm. If you need help with your wound outside these hours and cannot wait until we are again available, contact your PCP or go to the hospital emergency room. PLEASE NOTE: IF YOU ARE UNABLE TO OBTAIN WOUND SUPPLIES, CONTINUE TO USE THE SUPPLIES YOU HAVE AVAILABLE UNTIL YOU ARE ABLE TO REACH US. IT IS MOST IMPORTANT TO KEEP THE WOUND COVERED AT ALL TIMES. Patient Experience     Thank you for trusting us with your care.   You may receive a survey from a company called [employer] asking for [employer]. We would appreciate it if you took a few minutes to share your experience.   Your input is very valuable to us

## 2022-08-16 ENCOUNTER — HOSPITAL ENCOUNTER (OUTPATIENT)
Dept: WOUND CARE | Age: 42
Discharge: HOME | End: 2022-08-16

## 2022-08-18 ENCOUNTER — ANTI-COAG VISIT (OUTPATIENT)
Dept: PHARMACY | Age: 42
End: 2022-08-18
Payer: MEDICAID

## 2022-08-18 DIAGNOSIS — Z29.9 DVT PROPHYLAXIS: Primary | ICD-10-CM

## 2022-08-18 DIAGNOSIS — Z79.01 LONG TERM (CURRENT) USE OF ANTICOAGULANTS: ICD-10-CM

## 2022-08-18 DIAGNOSIS — I26.09 PULMONARY EMBOLISM WITH ACUTE COR PULMONALE, UNSPECIFIED CHRONICITY, UNSPECIFIED PULMONARY EMBOLISM TYPE (HCC): ICD-10-CM

## 2022-08-18 LAB — INTERNATIONAL NORMALIZATION RATIO, POC: 2.2

## 2022-08-18 PROCEDURE — 99211 OFF/OP EST MAY X REQ PHY/QHP: CPT

## 2022-08-18 PROCEDURE — 85610 PROTHROMBIN TIME: CPT

## 2022-08-18 NOTE — PROGRESS NOTES
Ms. Phyllis Johnson is a 46 y.o. y/o female with history of DVT, PE in about 2010. She presents today for anticoagulation monitoring and adjustment. Pertinent PMH: DVT assoc with PICC line  Hx of multiple sclerosis,     Patient Reported Findings:  Yes     No  []   [x]       Patient verifies current dosing regimen as listed---> uses AVS --> confirms dose --> does not know dose, but takes what RN gives her but then states that she might have taken 5 mg daily but took old dose. Does not know dose, uses AVS but AVS ran out last week so she is unsure after that --> states that aide followed avs for doses --> spoke with prasanth HEWITT, 2.5 on Sun, Tues and Thurs and 5 mg all other days of the week---> need to call Nicanor Kenyonmachen     []   [x]       S/S bleeding/bruising/swelling/SOB -denies   []   [x]       Blood in urine or stool - denies   []   [x]       Procedures scheduled in the future at this time  ---> none upcoming   []   [x]       Missed Dose -denies    []   [x]       Extra Dose - denies  [x]   []       Change in medications-using perc and tylenol prn for pain with new braces- states this will continue---> takes tylenol prn ---> cefpodoxil finished several days ago, had several tylenol --> no more tylenol, no other changes in meds -->has not been taking sea rivero for a while, unclear when --> cefpodoxime from ED, no interaction---> doxy started 5/27- done today---> flagyl, ceftriaxone, and vanco she states until Friday --> finished all abx except 1 week of fluconazole --> unsure if patient is still taking cipro, new multivit and will restart biotin soon. ---> no changes   []   [x]       Change in health/diet/appetite Normal vegetable is about twice a week and generally a low vitamin K. When she has broccoli it would be in a stir truong. May have a salad only once a month.---> had vegetables, no significant vit k ---> had greens twice since last visit.  States that she had done a detox with tea (haven and turmeric tea)  had greens 2-3 times in the past week, no NVD --> no changes, greens 2-3 times, no NVD --> no vomiting. No greens d/t being ill. ---> had greens 3x this week --> had greens a few times, no V/D --> tried to have more vit k to help INR ---> states had more greens, diarrhea last week, no NV --> had green beans this weekend. Plans to have greens this week --> no changes--->states more greens --> no changes, no NVD  []   [x]       Change in alcohol use adamant that she did not drink alcohol while in [place] --> states now that she had one glass of alcohol in [facility]. none recently --> denies---> had a large mixed drink in last week---> denies    []   [x]       Change in activity  []   [x]       Hospital admission   [x]   []       Emergency department visit- at [facility] 5/13-5/14 for UTI. INR 2. 9. was prescribed cefpodoxime. Was given warfarin 2.5 mg in hospital      5/21- [facility]   5/24-, was told to take 1.5 pills of warfarin and recheck INR dt INR of 1.4    7/5 for UTI. Was prescribed   []   [x]       Other complaints   Clinical Outcomes:  Yes     No  []   [x]       Major bleeding event  []   [x]       Thromboembolic event    Duration of warfarin therapy: Indefinitely  INR Range:  2.0-3.0     Continues to miss apts and miss doses. Pt rarely offers up any information to determine cause of INR fluctuations. Jenna Campbell RN fills [facility]. Will need to call and clarify what is going on. [phone]. Called Jenna Campbell 8/18 to inform of INR/dose/apt. INR is 2.2 today after dose increase at last visit. Take 3.75mg on Tues and 2.5mg all other days. Encouraged to maintain a consistency of vegetables/salads.    Recheck INR in 2 weeks, 9/1    **consent form signed 10/27/2021    Referring PCP is Dr. Tonja Mello results  INR (no units)   Date Value   06/17/2022 5.12 (HH)   10/05/2020 8.93 (HH)   09/19/2020 3.22 (H)   07/17/2020 2.16 (H)     INR,(POC) (no units)   Date Value   08/18/2022 2.2   07/29/2022 1.5

## 2022-08-23 ENCOUNTER — HOSPITAL ENCOUNTER (OUTPATIENT)
Dept: WOUND CARE | Age: 42
Discharge: HOME | End: 2022-08-23
Payer: MEDICAID

## 2022-08-23 VITALS
DIASTOLIC BLOOD PRESSURE: 54 MMHG | SYSTOLIC BLOOD PRESSURE: 85 MMHG | TEMPERATURE: 96.9 F | HEART RATE: 103 BPM | RESPIRATION RATE: 16 BRPM

## 2022-08-23 DIAGNOSIS — L89.314 DECUBITUS ULCER OF RIGHT ISCHIUM, STAGE 4 (HCC): ICD-10-CM

## 2022-08-23 DIAGNOSIS — L89.154 SACRAL DECUBITUS ULCER, STAGE IV (HCC): Primary | ICD-10-CM

## 2022-08-23 DIAGNOSIS — L89.324 PRESSURE INJURY OF LEFT ISCHIUM, STAGE 4 (HCC): ICD-10-CM

## 2022-08-23 PROCEDURE — 11042 DBRDMT SUBQ TIS 1ST 20SQCM/<: CPT | Performed by: SURGERY

## 2022-08-23 PROCEDURE — 11042 DBRDMT SUBQ TIS 1ST 20SQCM/<: CPT

## 2022-08-23 RX ORDER — GENTAMICIN SULFATE 1 MG/G
OINTMENT TOPICAL ONCE
Status: CANCELLED | OUTPATIENT
Start: 2022-08-23 | End: 2022-08-23

## 2022-08-23 RX ORDER — BACITRACIN ZINC AND POLYMYXIN B SULFATE 500; 1000 [USP'U]/G; [USP'U]/G
OINTMENT TOPICAL ONCE
Status: CANCELLED | OUTPATIENT
Start: 2022-08-23 | End: 2022-08-23

## 2022-08-23 RX ORDER — LIDOCAINE 40 MG/G
CREAM TOPICAL ONCE
Status: CANCELLED | OUTPATIENT
Start: 2022-08-23 | End: 2022-08-23

## 2022-08-23 RX ORDER — CLOBETASOL PROPIONATE 0.5 MG/G
OINTMENT TOPICAL ONCE
Status: CANCELLED | OUTPATIENT
Start: 2022-08-23 | End: 2022-08-23

## 2022-08-23 RX ORDER — LIDOCAINE HYDROCHLORIDE 20 MG/ML
JELLY TOPICAL ONCE
Status: CANCELLED | OUTPATIENT
Start: 2022-08-23 | End: 2022-08-23

## 2022-08-23 RX ORDER — LIDOCAINE HYDROCHLORIDE 40 MG/ML
SOLUTION TOPICAL ONCE
Status: CANCELLED | OUTPATIENT
Start: 2022-08-23 | End: 2022-08-23

## 2022-08-23 RX ORDER — BACITRACIN, NEOMYCIN, POLYMYXIN B 400; 3.5; 5 [USP'U]/G; MG/G; [USP'U]/G
OINTMENT TOPICAL ONCE
Status: CANCELLED | OUTPATIENT
Start: 2022-08-23 | End: 2022-08-23

## 2022-08-23 RX ORDER — LIDOCAINE 50 MG/G
OINTMENT TOPICAL ONCE
Status: CANCELLED | OUTPATIENT
Start: 2022-08-23 | End: 2022-08-23

## 2022-08-23 RX ORDER — BETAMETHASONE DIPROPIONATE 0.05 %
OINTMENT (GRAM) TOPICAL ONCE
Status: CANCELLED | OUTPATIENT
Start: 2022-08-23 | End: 2022-08-23

## 2022-08-23 RX ORDER — LIDOCAINE 40 MG/G
CREAM TOPICAL ONCE
Status: DISCONTINUED | OUTPATIENT
Start: 2022-08-23 | End: 2022-08-24 | Stop reason: HOSPADM

## 2022-08-23 RX ORDER — GINSENG 100 MG
CAPSULE ORAL ONCE
Status: CANCELLED | OUTPATIENT
Start: 2022-08-23 | End: 2022-08-23

## 2022-08-23 NOTE — PLAN OF CARE
Discharge instructions given. Patient verbalized understanding. Return to [facility] in 1 week(s).   Called/faxed orders to [facility]  Xray pelvis ordered

## 2022-08-23 NOTE — PROGRESS NOTES
[address] [facility],[address] Floor:      [address] f: [phone] f: [phone] p: [phone] [email or web address]     Ordering Center: Anna Bowden [address]  [facility] [address]  [phone]  Dept: [phone]   Fax# [phone]    Patient Information:      Al[address]george 86 1015 [facility] [address] [place] [address]   [phone]   : 1980  AGE: 46 y.o. GENDER: female   TODAYS DATE:  2022    Insurance:      PRIMARY INSURANCE:  Plan: MEDICAID [facility] DEPT OF JOB  Coverage: [place]ICA[place]  Effective Date: 2015  Group Number: [unfilled]  Subscriber Number: [id number] - (Medicaid)    Payer/Plan Subscr  Sex Relation Sub. Ins. ID Effective Group Num   1.  MEDICAID [place] -* DEMARCO SCHWARZ 1980 Female Self [id number] 1/1/15                                    [address]         Patient Wound Information:     Additional ICD-10 Codes:     Patient Active Problem List   Diagnosis Code    DVT prophylaxis Z29.9    Pulmonary emboli ([facility] I26.99    Long term (current) use of anticoagulants Z79.01    Sacral decubitus ulcer, stage IV ([facility]) L89.154       WOUNDS REQUIRING DRESSING SUPPLIES:     Wound 22 Sacrum #2 (Active)   Wound Image   22 0930   Wound Etiology Pressure Stage 3 22 0930   Wound Cleansed Cleansed with saline 22 0930   Dressing/Treatment Alginate;Dry dressing 22 1010   Wound Length (cm) 2.5 cm 22   Wound Width (cm) 1.4 cm 22   Wound Depth (cm) 1 cm 22   Wound Surface Area (cm^2) 3.5 cm^2 22   Change in Wound Size % (l*w) -194.12 22   Wound Volume (cm^3) 3.5 cm^3 22   Wound Healing % -635 22   Post-Procedure Length (cm) 2.5 cm 22   Post-Procedure Width (cm) 1.4 cm 22   Post-Procedure Depth (cm) 1 cm 22   Post-Procedure Surface Area (cm^2) 3.5 cm^2 22   Post-Procedure Volume (cm^3) 3.5 cm^3 08/23/22 0948   Wound Assessment Bleeding 08/23/22 0948   Drainage Amount Moderate 08/23/22 0948   Drainage Description Serosanguinous 08/23/22 0927   Odor None 08/23/22 0927   Merlyn-wound Assessment Intact 08/23/22 0927   Margins Defined edges 08/23/22 0927   Number of days: 112       Wound 05/03/22 Ischium Left #3 (Active)   Wound Image   08/23/22 0930   Wound Etiology Pressure Stage 4 08/23/22 0927   Wound Cleansed Cleansed with saline 08/23/22 0927   Dressing/Treatment Alginate;Dry dressing 05/31/22 1010   Wound Length (cm) 5.5 cm 08/23/22 0930   Wound Width (cm) 3.1 cm 08/23/22 0930   Wound Depth (cm) 1.3 cm 08/23/22 0930   Wound Surface Area (cm^2) 17.05 cm^2 08/23/22 0930   Change in Wound Size % (l*w) 57.69 08/23/22 0930   Wound Volume (cm^3) 22.165 cm^3 08/23/22 0930   Wound Healing % -38 08/23/22 0930   Post-Procedure Length (cm) 2.1 cm 08/04/22 1637   Post-Procedure Width (cm) 2.4 cm 08/04/22 1637   Post-Procedure Depth (cm) 1.1 cm 08/04/22 1637   Post-Procedure Surface Area (cm^2) 5.04 cm^2 08/04/22 1637   Post-Procedure Volume (cm^3) 5.544 cm^3 08/04/22 1637   Distance Tunneling (cm) 5.5 cm 08/23/22 0930   Tunneling Position ___ O'Clock 1200 08/23/22 0930   Wound Assessment Bleeding 08/23/22 0930   Drainage Amount Moderate 08/23/22 0930   Drainage Description Serosanguinous 08/23/22 0930   Odor None 08/23/22 0930   Merlyn-wound Assessment Maceration 08/23/22 0930   Margins Defined edges 08/23/22 0930   Number of days: 112       Wound 08/23/22 Ischium Right # 4 (Active)   Wound Image   08/23/22 0930   Wound Etiology Pressure Stage 2 08/23/22 0930   Wound Cleansed Cleansed with saline 08/23/22 0930   Wound Length (cm) 2.2 cm 08/23/22 0930   Wound Width (cm) 0.8 cm 08/23/22 0930   Wound Depth (cm) 0.1 cm 08/23/22 0930   Wound Surface Area (cm^2) 1.76 cm^2 08/23/22 0930   Wound Volume (cm^3) 0.176 cm^3 08/23/22 0930   Wound Assessment Granulation tissue 08/23/22 0930   Drainage Amount Moderate 08/23/22 0930   Drainage Description Serosanguinous 08/23/22 0930   Odor None 08/23/22 0930   Merlyn-wound Assessment Excoriated 08/23/22 0930   Margins Defined edges 08/23/22 0930   Number of days: 0          Supplies Requested :      WOUND #: 2 and 3   PRIMARY DRESSING:    Other: Aquacel Ag rope   Cover and Secure with: 4X4 non woven gauze pad ABD pad  Other Kerramax Gentle Border 6 x 6     FREQUENCY OF DRESSING CHANGES:  Daily    Wound Thickness [x] Full   []Partial       WOUND #: 4   PRIMARY DRESSING:    Hydrocolloid thin   Cover and Secure with: None     FREQUENCY OF DRESSING CHANGES:  Every other day    Wound Thickness [x] Full   []Partial       Patient Wound(s) Debrided: [x] Yes   [] No    Debridement Date: 8/23/2022    Debribement Type: Excisional/Sharp    ADDITIONAL ITEMS:  [] Gloves Small  [x] Gloves Medium [] Gloves Large [] Gloves Melrickn Geoer  [] Paper Tape 1\" [] Paper Tape 2\" [] Paper Tape 3\"  [x] Medipore Tape 3\"  [x] Saline  [] Skin Prep   [] Adhesive Remover   [x] Cotton Tip Applicators  [] Tubular Stocking   [] Size E  [] Size G  [] Other:    Patient currently being seen by Home Health: [] Yes   [x] No    Quantity of days dispensed:  [x]15  []30  []60  []90 Days    Order valid for 90 days    Provider Information:      PROVIDER'S NAME/NPI  Yuly Lundy MD NPI: [id number]   I give permission to coordinate the care for this patient

## 2022-08-23 NOTE — PROGRESS NOTES
08/23/22 0927   Margins Defined edges 08/23/22 0927   Number of days: 112       Wound 05/03/22 Ischium Left #3 (Active)   Wound Image   08/23/22 0930   Wound Etiology Pressure Stage 4 08/23/22 0927   Wound Cleansed Cleansed with saline 08/23/22 0927   Dressing/Treatment Alginate;Dry dressing 05/31/22 1010   Wound Length (cm) 5.5 cm 08/23/22 0930   Wound Width (cm) 3.1 cm 08/23/22 0930   Wound Depth (cm) 1.3 cm 08/23/22 0930   Wound Surface Area (cm^2) 17.05 cm^2 08/23/22 0930   Change in Wound Size % (l*w) 57.69 08/23/22 0930   Wound Volume (cm^3) 22.165 cm^3 08/23/22 0930   Wound Healing % -38 08/23/22 0930   Post-Procedure Length (cm) 2.1 cm 08/04/22 1637   Post-Procedure Width (cm) 2.4 cm 08/04/22 1637   Post-Procedure Depth (cm) 1.1 cm 08/04/22 1637   Post-Procedure Surface Area (cm^2) 5.04 cm^2 08/04/22 1637   Post-Procedure Volume (cm^3) 5.544 cm^3 08/04/22 1637   Distance Tunneling (cm) 5.5 cm 08/23/22 0930   Tunneling Position ___ O'Clock 1200 08/23/22 0930   Wound Assessment Bleeding 08/23/22 0930   Drainage Amount Moderate 08/23/22 0930   Drainage Description Serosanguinous 08/23/22 0930   Odor None 08/23/22 0930   Merlyn-wound Assessment Maceration 08/23/22 0930   Margins Defined edges 08/23/22 0930   Number of days: 112       Wound 08/23/22 Ischium Right # 4 (Active)   Wound Image   08/23/22 0930   Wound Etiology Pressure Stage 2 08/23/22 0930   Wound Cleansed Cleansed with saline 08/23/22 0930   Wound Length (cm) 2.2 cm 08/23/22 0930   Wound Width (cm) 0.8 cm 08/23/22 0930   Wound Depth (cm) 0.1 cm 08/23/22 0930   Wound Surface Area (cm^2) 1.76 cm^2 08/23/22 0930   Wound Volume (cm^3) 0.176 cm^3 08/23/22 0930   Wound Assessment Granulation tissue 08/23/22 0930   Drainage Amount Moderate 08/23/22 0930   Drainage Description Serosanguinous 08/23/22 0930   Odor None 08/23/22 0930   Merlyn-wound Assessment Excoriated 08/23/22 0930   Margins Defined edges 08/23/22 0930   Number of days: 0           Total Surface Area Debrided: 8.84 sq cm     Bleeding:  Minimal    Hemostasis Achieved:  by pressure    Procedural Pain:  2  / 10     Post Procedural Pain:  0 / 10     Response to treatment:  Well tolerated by patient. The nature of the patient's condition was explained in depth. The patient was informed that their compliance to the treatment plan is paramount to successful healing and prevention of further ulceration and/or infection     Treatment Plan:  46year old female with multiple sclerosis seen in follow-up for pressure ulcerations of the bilateral ischial regions and sacral region. She admits to being up more during a recent trip. Also, she has not been compliant with follow-up visits. There is a new stage III ulceration in the right ischial region. The sacral ulcer and left ischial ulcer are stable to slightly improved. The sacral and left ischial ulcers were debrided. We will continue silver alginate dressing changes to the sacral and left ischial ulcers. The right ischial ulcer will be dressed with antibiotic ointment and a dry bandage. Follow-up in the wound center in 1 week.       Olga Rothman MD, M.D.  8/23/2022

## 2022-08-23 NOTE — DISCHARGE INSTRUCTIONS
sure to loosely pack into deepest parts & tunnels), Kerramax gentle border or 4 x 4's & tape- change every 2 days and as needed for drainage or soilage. Increase protein, continue to drink Myron. Turn & reposition every 1-2 hours and as needed for pressure relief and comfort. Up in chair for meals only. Home Care Agency/Facility:     Your wound-care supplies will be provided by: [address] f: [phone] f: [phone] p: [phone] [email or web address]    Please note, depending on your insurance coverage, you may have out-of-pocket expenses for these supplies. Someone from the company should call you to confirm your order and discuss those potential costs before they ship your products -- please anticipate that call. If your out-of-pocket cost could be substantial, Many companies have financial hardship programs for patients who qualify, so please ask about that if you might need a hand. If you have any questions about your supplies or your potential out-of-pocket costs, or if you need to place an order for a refill of supplies (typically monthly), please call the company directly. Your  is Ivana Lord     Follow up with Dr Margoth Pierce In 1 week(s) in the wound care center. Wound Care Center Information: Should you experience any significant changes in your wound(s) or have questions about your wound care, please contact the [facility] at [phone] Monday  - Thursday 8:00 am - 4:00 pm and Friday 8:00 am - 1:00pm. If you need help with your wound outside these hours and cannot wait until we are again available, contact your PCP or go to the hospital emergency room. PLEASE NOTE: IF YOU ARE UNABLE TO OBTAIN WOUND SUPPLIES, CONTINUE TO USE THE SUPPLIES YOU HAVE AVAILABLE UNTIL YOU ARE ABLE TO REACH US. IT IS MOST IMPORTANT TO KEEP THE WOUND COVERED AT ALL TIMES.      Patient Experience     Thank you for trusting us with your care. You may receive a survey from a company called [employer] asking for your feedback. We would appreciate it if you took a few minutes to share your experience.   Your input is very valuable to us

## 2022-08-26 ENCOUNTER — HOSPITAL ENCOUNTER (OUTPATIENT)
Age: 42
Discharge: HOME | End: 2022-08-26
Payer: MEDICAID

## 2022-08-26 ENCOUNTER — HOSPITAL ENCOUNTER (OUTPATIENT)
Dept: GENERAL RADIOLOGY | Age: 42
Discharge: HOME | End: 2022-08-26
Payer: MEDICAID

## 2022-08-26 DIAGNOSIS — L89.324 PRESSURE INJURY OF LEFT ISCHIUM, STAGE 4 (HCC): ICD-10-CM

## 2022-08-26 DIAGNOSIS — L89.314 DECUBITUS ULCER OF RIGHT ISCHIUM, STAGE 4 (HCC): ICD-10-CM

## 2022-08-26 DIAGNOSIS — L89.154 SACRAL DECUBITUS ULCER, STAGE IV (HCC): ICD-10-CM

## 2022-08-26 PROCEDURE — 72190 X-RAY EXAM OF PELVIS: CPT

## 2022-08-29 NOTE — DISCHARGE INSTRUCTIONS
[address], [address]  Telephone: [phone]     Discharge Instructions     Important reminders:     **If you have any signs and symptoms of illness (Cough, fever, congestion, nausea, vomiting, diarrhea, etc.) please call the wound care center prior to your appointment. 1. Increase Protein intake for optimal wound healing  2. No added salt to reduce any swelling  3. If diabetic, maintain good glucose control  4. If you smoke, smoking prohibits wound healing, we ask that you refrain from smoking. 5. When taking antibiotics take the entire prescription as ordered. Do not stop taking until medication is all gone unless otherwise instructed. 6. Exercise as tolerated. 7. Keep weight off wounds and reposition every 2 hours if applicable. 8. If wound(s) is on your lower extremity, elevate legs to the level of the heart or above for 30 minutes 4-5 times a day and/or when sitting. Avoid standing for long periods of time. 9. Do not get wounds wet in bath or shower unless otherwise instructed by your physician. If your wound is on your foot or leg, you may purchase a cast bag. Please ask at the pharmacy. If Vascular testing is ordered, please call [address] [place] [address] [phone]) to schedule. Vascular tests ordered by Wound Care Physicians may take up to 2 hours to complete. Please keep that in mind when scheduling. If Vascular testing is scheduled, please bring supplies to replace your dressing after testing is done. The vascular department does not stock supplies. Wound: Sacrum, Right ischium, Left ischium     With each dressing change, rinse wounds with 0.9% Saline. (May use wound wash or soft contact solution. Both can be purchased at a local drug store). If unable to obtain saline, may use a gentle soap and water. Dressing care: Right ischium- hydrocolloid- change every 2 days.  Sacrum, Left ischium- pack with Alginate ag rope (be sure to loosely pack into deepest parts & tunnels), Kerramax gentle border or 4 x 4's & tape- change every 2 days and as needed for drainage or soilage. Increase protein, continue to drink Myron. Turn & reposition every 1-2 hours and as needed for pressure relief and comfort. Up in chair for meals only. Home Care Agency/Facility:     Your wound-care supplies will be provided by: [address] f: [phone] f: [phone] p: [phone] [email or web address]    Please note, depending on your insurance coverage, you may have out-of-pocket expenses for these supplies. Someone from the company should call you to confirm your order and discuss those potential costs before they ship your products -- please anticipate that call. If your out-of-pocket cost could be substantial, Many companies have financial hardship programs for patients who qualify, so please ask about that if you might need a hand. If you have any questions about your supplies or your potential out-of-pocket costs, or if you need to place an order for a refill of supplies (typically monthly), please call the company directly. Your  is Alpesh Olivier     Follow up with Dr Renata Ramirez In 2 week(s) in the wound care center. Wound Care Center Information: Should you experience any significant changes in your wound(s) or have questions about your wound care, please contact the [facility] at [phone] Monday  - Thursday 8:00 am - 4:00 pm and Friday 8:00 am - 1:00pm. If you need help with your wound outside these hours and cannot wait until we are again available, contact your PCP or go to the hospital emergency room. PLEASE NOTE: IF YOU ARE UNABLE TO OBTAIN WOUND SUPPLIES, CONTINUE TO USE THE SUPPLIES YOU HAVE AVAILABLE UNTIL YOU ARE ABLE TO REACH US. IT IS MOST IMPORTANT TO KEEP THE WOUND COVERED AT ALL TIMES.      Patient Experience     Thank you for trusting us with your care. You may receive a survey from a company called [employer] asking for your feedback. We would appreciate it if you took a few minutes to share your experience.   Your input is very valuable to us

## 2022-08-30 ENCOUNTER — HOSPITAL ENCOUNTER (OUTPATIENT)
Dept: WOUND CARE | Age: 42
Discharge: HOME | End: 2022-08-30
Payer: MEDICAID

## 2022-08-30 VITALS
DIASTOLIC BLOOD PRESSURE: 58 MMHG | SYSTOLIC BLOOD PRESSURE: 84 MMHG | WEIGHT: 90 LBS | HEART RATE: 98 BPM | BODY MASS INDEX: 16.46 KG/M2 | RESPIRATION RATE: 15 BRPM | TEMPERATURE: 96.9 F

## 2022-08-30 DIAGNOSIS — L89.154 SACRAL DECUBITUS ULCER, STAGE IV (HCC): Primary | ICD-10-CM

## 2022-08-30 PROCEDURE — 11042 DBRDMT SUBQ TIS 1ST 20SQCM/<: CPT

## 2022-08-30 PROCEDURE — 11042 DBRDMT SUBQ TIS 1ST 20SQCM/<: CPT | Performed by: SURGERY

## 2022-08-30 PROCEDURE — 11045 DBRDMT SUBQ TISS EACH ADDL: CPT | Performed by: SURGERY

## 2022-08-30 PROCEDURE — 11045 DBRDMT SUBQ TISS EACH ADDL: CPT

## 2022-08-30 RX ORDER — BACITRACIN ZINC AND POLYMYXIN B SULFATE 500; 1000 [USP'U]/G; [USP'U]/G
OINTMENT TOPICAL ONCE
Status: CANCELLED | OUTPATIENT
Start: 2022-08-30 | End: 2022-08-30

## 2022-08-30 RX ORDER — LIDOCAINE 40 MG/G
CREAM TOPICAL ONCE
Status: CANCELLED | OUTPATIENT
Start: 2022-08-30 | End: 2022-08-30

## 2022-08-30 RX ORDER — GENTAMICIN SULFATE 1 MG/G
OINTMENT TOPICAL ONCE
Status: CANCELLED | OUTPATIENT
Start: 2022-08-30 | End: 2022-08-30

## 2022-08-30 RX ORDER — LIDOCAINE 40 MG/G
CREAM TOPICAL ONCE
Status: DISCONTINUED | OUTPATIENT
Start: 2022-08-30 | End: 2022-08-31 | Stop reason: HOSPADM

## 2022-08-30 RX ORDER — BACITRACIN, NEOMYCIN, POLYMYXIN B 400; 3.5; 5 [USP'U]/G; MG/G; [USP'U]/G
OINTMENT TOPICAL ONCE
Status: CANCELLED | OUTPATIENT
Start: 2022-08-30 | End: 2022-08-30

## 2022-08-30 RX ORDER — CLOBETASOL PROPIONATE 0.5 MG/G
OINTMENT TOPICAL ONCE
Status: CANCELLED | OUTPATIENT
Start: 2022-08-30 | End: 2022-08-30

## 2022-08-30 RX ORDER — LIDOCAINE HYDROCHLORIDE 40 MG/ML
SOLUTION TOPICAL ONCE
Status: CANCELLED | OUTPATIENT
Start: 2022-08-30 | End: 2022-08-30

## 2022-08-30 RX ORDER — BETAMETHASONE DIPROPIONATE 0.05 %
OINTMENT (GRAM) TOPICAL ONCE
Status: CANCELLED | OUTPATIENT
Start: 2022-08-30 | End: 2022-08-30

## 2022-08-30 RX ORDER — LIDOCAINE 50 MG/G
OINTMENT TOPICAL ONCE
Status: CANCELLED | OUTPATIENT
Start: 2022-08-30 | End: 2022-08-30

## 2022-08-30 RX ORDER — LIDOCAINE HYDROCHLORIDE 20 MG/ML
JELLY TOPICAL ONCE
Status: CANCELLED | OUTPATIENT
Start: 2022-08-30 | End: 2022-08-30

## 2022-08-30 RX ORDER — GINSENG 100 MG
CAPSULE ORAL ONCE
Status: CANCELLED | OUTPATIENT
Start: 2022-08-30 | End: 2022-08-30

## 2022-08-30 NOTE — PROGRESS NOTES
Spoke with [facility] regarding shipping of supplies. States gauze, ABD, & Hydrocolloid were shipped on 8/23/22 & delivered on 8/24/22- they can ship alginate ag rope today, but Keramax Gentle Borders can not be shipped until 9/9/22. Will call pt to inform & have her call [facility] on 9/9/22 to have borders shipped.

## 2022-08-30 NOTE — PLAN OF CARE
Discharge instructions given. Patient verbalized understanding. Return to [address] [place] [address],[address] in 2 week(s).

## 2022-08-31 NOTE — PROGRESS NOTES
Alaina Villeda  AGE: 46 y.o. GENDER: female  : 1980  TODAY'S DATE:  2022    Chief Complaint   Patient presents with    Wound Check     Follow up Pressure wounds        HISTORY of PRESENT ILLNESS HPI     Alaina Villeda is a 46 y.o. female who presents today for wound evaluation. History of Wound: Pressure ulcers  Wound Pain:  mild  Severity:  2 / 10   Wound Type:  pressure  Modifying Factors:  non-adherence  Associated Signs/Symptoms:  none    Procedure Note    Performed by: Marjorie Curry MD    Consent obtained: Yes    Time out taken:  Yes    Pain Control: Anesthetic  Anesthetic: 4% Lidocaine Cream     Debridement:Excisional Debridement    Using curette the wound was sharply debrided    down through and including the removal of subcutaneous tissue.         Devitalized Tissue Debrided:  necrotic/eschar    Pre Debridement Measurements:  Are located in the Wound Documentation Flow Sheet    Wound #: 2 and 3     Post  Debridement Measurements:  Wound 22 Sacrum #2 (Active)   Wound Image   22 0930   Wound Etiology Pressure Stage 3 22 1007   Wound Cleansed Cleansed with saline 22 1007   Dressing/Treatment Alginate with Ag;Dry dressing 22 1046   Wound Length (cm) 2.1 cm 22 1007   Wound Width (cm) 1.7 cm 22 1007   Wound Depth (cm) 1 cm 22 1007   Wound Surface Area (cm^2) 3.57 cm^2 22 1007   Change in Wound Size % (l*w) -200 22 1007   Wound Volume (cm^3) 3.57 cm^3 22 1007   Wound Healing % -650 22 1007   Post-Procedure Length (cm) 2.1 cm 22 1046   Post-Procedure Width (cm) 1.7 cm 22 1046   Post-Procedure Depth (cm) 1 cm 22 1046   Post-Procedure Surface Area (cm^2) 3.57 cm^2 22 1046   Post-Procedure Volume (cm^3) 3.57 cm^3 22 1046   Wound Assessment Bleeding 22 1046   Drainage Amount Moderate 22 1046   Drainage Description Serosanguinous 22 1007   Odor None 22 1007   Merlyn-wound None 08/30/22 1007   Merlyn-wound Assessment Intact 08/30/22 1007   Margins Attached edges; Defined edges 08/30/22 1007   Number of days: 8           Total Surface Area Debrided:  33.39 sq cm     Bleeding:  Minimal    Hemostasis Achieved:  by pressure    Procedural Pain:  2  / 10     Post Procedural Pain:  0 / 10     Response to treatment:  Well tolerated by patient. The nature of the patient's condition was explained in depth. The patient was informed that their compliance to the treatment plan is paramount to successful healing and prevention of further ulceration and/or infection     Treatment Plan:   61-year-old female seen in follow-up for bilateral ischial pressure ulcers as well as a sacral ulcer. All areas have improved. Pelvic x-rays did not show signs of osteomyelitis. The left ischial ulcer and sacral ulcers were debrided. We will continue the dressing changes at all sites. Follow-up in the wound center in 1 week.      Latia Drew MD, M.SUNDAY.  8/31/2022

## 2022-09-07 ENCOUNTER — TELEPHONE (OUTPATIENT)
Dept: PHARMACY | Age: 42
End: 2022-09-07

## 2022-09-08 NOTE — TELEPHONE ENCOUNTER
Patient LVM cancelling her appt. States she is in the hospital.  She did not mention which one.  (INR 2.7)

## 2022-09-12 NOTE — DISCHARGE INSTRUCTIONS
[address], [address]  Telephone: [phone]     Discharge Instructions     Important reminders:     **If you have any signs and symptoms of illness (Cough, fever, congestion, nausea, vomiting, diarrhea, etc.) please call the wound care center prior to your appointment. 1. Increase Protein intake for optimal wound healing  2. No added salt to reduce any swelling  3. If diabetic, maintain good glucose control  4. If you smoke, smoking prohibits wound healing, we ask that you refrain from smoking. 5. When taking antibiotics take the entire prescription as ordered. Do not stop taking until medication is all gone unless otherwise instructed. 6. Exercise as tolerated. 7. Keep weight off wounds and reposition every 2 hours if applicable. 8. If wound(s) is on your lower extremity, elevate legs to the level of the heart or above for 30 minutes 4-5 times a day and/or when sitting. Avoid standing for long periods of time. 9. Do not get wounds wet in bath or shower unless otherwise instructed by your physician. If your wound is on your foot or leg, you may purchase a cast bag. Please ask at the pharmacy. If Vascular testing is ordered, please call [address] [place] [address] [phone]) to schedule. Vascular tests ordered by Wound Care Physicians may take up to 2 hours to complete. Please keep that in mind when scheduling. If Vascular testing is scheduled, please bring supplies to replace your dressing after testing is done. The vascular department does not stock supplies. Wound: Sacrum, Right ischium, Left ischium     With each dressing change, rinse wounds with 0.9% Saline. (May use wound wash or soft contact solution. Both can be purchased at a local drug store). If unable to obtain saline, may use a gentle soap and water. Dressing care: Right ischium- hydrocolloid- change every 2 days.  Sacrum, Left ischium- pack with Alginate ag rope (be sure to loosely pack into deepest parts & tunnels), Kerramax gentle border or 4 x 4's & tape- change every 2 days and as needed for drainage or soilage. Increase protein, continue to drink Myron. Turn & reposition every 1-2 hours and as needed for pressure relief and comfort. Up in chair for meals only. Home Care Agency/Facility:     Your wound-care supplies will be provided by: [address] f: [phone] f: [phone] p: [phone] [email or web address]    Please note, depending on your insurance coverage, you may have out-of-pocket expenses for these supplies. Someone from the company should call you to confirm your order and discuss those potential costs before they ship your products -- please anticipate that call. If your out-of-pocket cost could be substantial, Many companies have financial hardship programs for patients who qualify, so please ask about that if you might need a hand. If you have any questions about your supplies or your potential out-of-pocket costs, or if you need to place an order for a refill of supplies (typically monthly), please call the company directly. Your  is Benson Zuniga     Follow up with Dr Emir Nunn In 2 week(s) in the wound care center. Wound Care Center Information: Should you experience any significant changes in your wound(s) or have questions about your wound care, please contact the [facility] at [phone] Monday  - Thursday 8:00 am - 4:00 pm and Friday 8:00 am - 1:00pm. If you need help with your wound outside these hours and cannot wait until we are again available, contact your PCP or go to the hospital emergency room. PLEASE NOTE: IF YOU ARE UNABLE TO OBTAIN WOUND SUPPLIES, CONTINUE TO USE THE SUPPLIES YOU HAVE AVAILABLE UNTIL YOU ARE ABLE TO REACH US. IT IS MOST IMPORTANT TO KEEP THE WOUND COVERED AT ALL TIMES.      Patient Experience     Thank you for trusting us with your care. You may receive a survey from a company called [employer] asking for your feedback. We would appreciate it if you took a few minutes to share your experience.   Your input is very valuable to us

## 2022-09-13 ENCOUNTER — ANTI-COAG VISIT (OUTPATIENT)
Dept: PHARMACY | Age: 42
End: 2022-09-13
Payer: MEDICAID

## 2022-09-13 ENCOUNTER — HOSPITAL ENCOUNTER (OUTPATIENT)
Dept: WOUND CARE | Age: 42
Discharge: HOME | End: 2022-09-13
Payer: MEDICAID

## 2022-09-13 VITALS
DIASTOLIC BLOOD PRESSURE: 57 MMHG | HEART RATE: 103 BPM | TEMPERATURE: 96.4 F | BODY MASS INDEX: 17.11 KG/M2 | SYSTOLIC BLOOD PRESSURE: 83 MMHG | HEIGHT: 62 IN | WEIGHT: 93 LBS

## 2022-09-13 DIAGNOSIS — Z29.9 DVT PROPHYLAXIS: Primary | ICD-10-CM

## 2022-09-13 DIAGNOSIS — Z79.01 LONG TERM (CURRENT) USE OF ANTICOAGULANTS: ICD-10-CM

## 2022-09-13 DIAGNOSIS — I26.09 PULMONARY EMBOLISM WITH ACUTE COR PULMONALE, UNSPECIFIED CHRONICITY, UNSPECIFIED PULMONARY EMBOLISM TYPE (HCC): ICD-10-CM

## 2022-09-13 DIAGNOSIS — L89.154 SACRAL DECUBITUS ULCER, STAGE IV (HCC): Primary | ICD-10-CM

## 2022-09-13 LAB — INTERNATIONAL NORMALIZATION RATIO, POC: 1.9

## 2022-09-13 PROCEDURE — 11042 DBRDMT SUBQ TIS 1ST 20SQCM/<: CPT | Performed by: SURGERY

## 2022-09-13 PROCEDURE — 99212 OFFICE O/P EST SF 10 MIN: CPT

## 2022-09-13 PROCEDURE — 11042 DBRDMT SUBQ TIS 1ST 20SQCM/<: CPT

## 2022-09-13 PROCEDURE — 85610 PROTHROMBIN TIME: CPT

## 2022-09-13 RX ORDER — LIDOCAINE HYDROCHLORIDE 20 MG/ML
JELLY TOPICAL ONCE
Status: CANCELLED | OUTPATIENT
Start: 2022-09-13 | End: 2022-09-13

## 2022-09-13 RX ORDER — LIDOCAINE HYDROCHLORIDE 40 MG/ML
SOLUTION TOPICAL ONCE
Status: CANCELLED | OUTPATIENT
Start: 2022-09-13 | End: 2022-09-13

## 2022-09-13 RX ORDER — GENTAMICIN SULFATE 1 MG/G
OINTMENT TOPICAL ONCE
Status: CANCELLED | OUTPATIENT
Start: 2022-09-13 | End: 2022-09-13

## 2022-09-13 RX ORDER — LIDOCAINE 40 MG/G
CREAM TOPICAL ONCE
Status: DISCONTINUED | OUTPATIENT
Start: 2022-09-13 | End: 2022-09-14 | Stop reason: HOSPADM

## 2022-09-13 RX ORDER — BACITRACIN, NEOMYCIN, POLYMYXIN B 400; 3.5; 5 [USP'U]/G; MG/G; [USP'U]/G
OINTMENT TOPICAL ONCE
Status: CANCELLED | OUTPATIENT
Start: 2022-09-13 | End: 2022-09-13

## 2022-09-13 RX ORDER — CLOBETASOL PROPIONATE 0.5 MG/G
OINTMENT TOPICAL ONCE
Status: CANCELLED | OUTPATIENT
Start: 2022-09-13 | End: 2022-09-13

## 2022-09-13 RX ORDER — GINSENG 100 MG
CAPSULE ORAL ONCE
Status: CANCELLED | OUTPATIENT
Start: 2022-09-13 | End: 2022-09-13

## 2022-09-13 RX ORDER — LIDOCAINE 50 MG/G
OINTMENT TOPICAL ONCE
Status: CANCELLED | OUTPATIENT
Start: 2022-09-13 | End: 2022-09-13

## 2022-09-13 RX ORDER — BACITRACIN ZINC AND POLYMYXIN B SULFATE 500; 1000 [USP'U]/G; [USP'U]/G
OINTMENT TOPICAL ONCE
Status: CANCELLED | OUTPATIENT
Start: 2022-09-13 | End: 2022-09-13

## 2022-09-13 RX ORDER — LIDOCAINE 40 MG/G
CREAM TOPICAL ONCE
Status: CANCELLED | OUTPATIENT
Start: 2022-09-13 | End: 2022-09-13

## 2022-09-13 RX ORDER — BETAMETHASONE DIPROPIONATE 0.05 %
OINTMENT (GRAM) TOPICAL ONCE
Status: CANCELLED | OUTPATIENT
Start: 2022-09-13 | End: 2022-09-13

## 2022-09-13 NOTE — PROGRESS NOTES
Samantha Awan  AGE: 46 y.o. GENDER: female  : 1980  TODAY'S DATE:  2022    Chief Complaint   Patient presents with    Wound Check     Right Ischium and sacrum  F/U        HISTORY of PRESENT ILLNESS HPI     Samantha Awan is a 46 y.o. female who presents today for wound evaluation. History of Wound: Multiple pressure ulcer  Wound Pain:  mild  Severity:  3 / 10   Wound Type:  pressure  Modifying Factors:  decreased mobility  Associated Signs/Symptoms:  none    Procedure Note    Performed by: Jose Luis Mc MD    Consent obtained: Yes    Time out taken:  Yes    Pain Control: Anesthetic  Anesthetic: 4% Lidocaine Cream     Debridement:Excisional Debridement    Using curette the wound was sharply debrided    down through and including the removal of subcutaneous tissue.         Devitalized Tissue Debrided:  necrotic/eschar    Pre Debridement Measurements:  Are located in the Wound Documentation Flow Sheet    Wound #: 2 and 3     Post  Debridement Measurements:  Wound 22 Sacrum #2 (Active)   Wound Image   22 0930   Wound Etiology Pressure Stage 3 22 1001   Wound Cleansed Cleansed with saline 22 1001   Dressing/Treatment Alginate with Ag;Dry dressing 22 1046   Wound Length (cm) 2.1 cm 22 1001   Wound Width (cm) 1.5 cm 22 1001   Wound Depth (cm) 0.9 cm 22 1001   Wound Surface Area (cm^2) 3.15 cm^2 22 1001   Change in Wound Size % (l*w) -164.71 22 1001   Wound Volume (cm^3) 2.835 cm^3 22 1001   Wound Healing % -496 22 1001   Post-Procedure Length (cm) 2.1 cm 22 1032   Post-Procedure Width (cm) 1.5 cm 22 1032   Post-Procedure Depth (cm) 0.9 cm 22 1032   Post-Procedure Surface Area (cm^2) 3.15 cm^2 22 1032   Post-Procedure Volume (cm^3) 2.835 cm^3 22 1032   Wound Assessment Bleeding 22 1032   Drainage Amount Moderate 22 1032   Drainage Description Serosanguinous 22 1007   Odor None 09/13/22 1001   Merlyn-wound Assessment Dry/flaky 09/13/22 1001   Margins Defined edges 09/13/22 1001   Wound Thickness Description not for Pressure Injury Full thickness 09/13/22 1001   Number of days: 133       Wound 05/03/22 Ischium Left #3 (Active)   Wound Image   08/23/22 0930   Wound Etiology Pressure Stage 4 09/13/22 1001   Wound Cleansed Cleansed with saline 09/13/22 1001   Dressing/Treatment Hydrocolloid 08/30/22 1046   Wound Length (cm) 2.5 cm 09/13/22 1001   Wound Width (cm) 0.6 cm 09/13/22 1001   Wound Depth (cm) 3.5 cm 09/13/22 1001   Wound Surface Area (cm^2) 1.5 cm^2 09/13/22 1001   Change in Wound Size % (l*w) 96.28 09/13/22 1001   Wound Volume (cm^3) 5.25 cm^3 09/13/22 1001   Wound Healing % 67 09/13/22 1001   Post-Procedure Length (cm) 2.5 cm 09/13/22 1032   Post-Procedure Width (cm) 0.6 cm 09/13/22 1032   Post-Procedure Depth (cm) 5 cm 09/13/22 1032   Post-Procedure Surface Area (cm^2) 1.5 cm^2 09/13/22 1032   Post-Procedure Volume (cm^3) 7.5 cm^3 09/13/22 1032   Distance Tunneling (cm) 8 cm 08/30/22 1007   Tunneling Position ___ O'Clock 1200 08/30/22 1007   Wound Assessment Bleeding 09/13/22 1032   Drainage Amount Moderate 09/13/22 1032   Drainage Description Serosanguinous 09/13/22 1001   Odor None 09/13/22 1001   Merlyn-wound Assessment Dry/flaky 09/13/22 1001   Margins Undefined edges 09/13/22 1001   Wound Thickness Description not for Pressure Injury Full thickness 09/13/22 1001   Number of days: 133       Wound 08/23/22 Ischium Right # 4 (Active)   Wound Image   08/23/22 0930   Wound Etiology Pressure Stage 2 09/13/22 1001   Wound Cleansed Cleansed with saline 09/13/22 1001   Dressing/Treatment Alginate with Ag;Dry dressing 08/30/22 1010   Wound Length (cm) 0 cm 09/13/22 1001   Wound Width (cm) 0 cm 09/13/22 1001   Wound Depth (cm) 0 cm 09/13/22 1001   Wound Surface Area (cm^2) 0 cm^2 09/13/22 1001   Change in Wound Size % (l*w) 100 09/13/22 1001   Wound Volume (cm^3) 0 cm^3 09/13/22 1001 Wound Healing % 100 09/13/22 1001   Wound Assessment Granulation tissue 09/13/22 1001   Drainage Amount None 09/13/22 1001   Drainage Description Serosanguinous 08/30/22 1007   Odor None 09/13/22 1001   Merlyn-wound Assessment Dry/flaky 09/13/22 1001   Margins Attached edges 09/13/22 1001   Number of days: 21           Total Surface Area Debrided:  4.65 sq cm     Bleeding:  Minimal    Hemostasis Achieved:  by pressure    Procedural Pain:  2  / 10     Post Procedural Pain:  0 / 10     Response to treatment:  Well tolerated by patient. The nature of the patient's condition was explained in depth. The patient was informed that their compliance to the treatment plan is paramount to successful healing and prevention of further ulceration and/or infection     Treatment Plan:   46year old female with MS seen in follow up for multiple ulcers:    1) left stage III ischial ulcer is very small and almost healed. Continue border dressing to the site. 2) sacral ulcer has improved. Debridement was performed. We will continue current dressing changes at the site. 3) the left ischial ulcer has improved. The patient admitted to [facility].  Cat scan from [facility] hospital showed osteomyelitis of the left ischial region. She is currently on oral antibiotics for treatment of the osteomyelitis. Debridement was performed at the site. We will continue to pack the long tract with Aquacel rope. Continue antipressure measurements. Follow-up in the wound center in 1 week.     Vasu Fofana MD, M.D.  9/13/2022

## 2022-09-13 NOTE — PROGRESS NOTES
Ms. Aura Peralta is a 46 y.o. y/o female with history of DVT, PE in about 2010. She presents today for anticoagulation monitoring and adjustment. Pertinent PMH: DVT assoc with PICC line  Hx of multiple sclerosis,     Patient Reported Findings:  Yes     No  []   [x]       Patient verifies current dosing regimen as listed---> uses AVS --> confirms dose --> does not know dose, but takes what RN gives her but then states that she might have taken 5 mg daily but took old dose. Does not know dose, uses AVS but AVS ran out last week so she is unsure after that --> states that aide followed avs for doses --> spoke with prasanth HEWITT, 2.5 on Sun, Tues and Thurs and 5 mg all other days of the week---> [facility]  ---> held since dcd   []   [x]       S/S bleeding/bruising/swelling/SOB -denies   []   [x]       Blood in urine or stool - denies   []   [x]       Procedures scheduled in the future at this time  ---> none upcoming   []   [x]       Missed Dose -denies    []   [x]       Extra Dose - denies  [x]   []       Change in medications-using perc and tylenol prn for pain with new braces- states this will continue---> takes tylenol prn ---> cefpodoxil finished several days ago, had several tylenol --> no more tylenol, no other changes in meds -->has not been taking sea rivero for a while, unclear when --> cefpodoxime from ED, no interaction---> doxy started 5/27- done today---> flagyl, ceftriaxone, and vanco she states until Friday --> finished all abx except 1 week of fluconazole --> unsure if patient is still taking cipro, new multivit and will restart biotin soon. ---> no changes---> states on Augmentin and Doxycycline for 12 days total (about 7 days left)  []   [x]       Change in health/diet/appetite Normal vegetable is about twice a week and generally a low vitamin K. When she has broccoli it would be in a stir truong.  May have a salad only once a month.---> had vegetables, no significant vit k ---> had greens twice since last visit. States that she had done a detox with tea (haven and turmeric tea)  had greens 2-3 times in the past week, no NVD --> no changes, greens 2-3 times, no NVD --> no vomiting. No greens d/t being ill. ---> had greens 3x this week --> had greens a few times, no V/D --> tried to have more vit k to help INR ---> states had more greens, diarrhea last week, no NV --> had green beans this weekend. Plans to have greens this week --> no changes--->states more greens --> no changes, no NVD  []   [x]       Change in alcohol use adamant that she did not drink alcohol while in [place] --> states now that she had one glass of alcohol in [facility]. none recently --> denies---> had a large mixed drink in last week---> denies    []   [x]       Change in activity  []   [x]       Hospital admission- 9/8- 9/10 for stomach pain and got abx. INR was 2.7 on admit then got abx and 5.2 on discharge so she held     9/8- 2.5mg  [x]   []       Emergency department visit- at [facility] 5/13-5/14 for UTI. INR 2. 9. was prescribed cefpodoxime. Was given warfarin 2.5 mg in hospital      5/21- spasms, hypomag   5/24-CP, was told to take 1.5 pills of warfarin and recheck INR dt INR of 1.4    7/5 for UTI. Was prescribed   []   [x]       Other complaints   Clinical Outcomes:  Yes     No  []   [x]       Major bleeding event  []   [x]       Thromboembolic event    Duration of warfarin therapy: Indefinitely  INR Range:  2.0-3.0     Continues to miss apts and miss doses. Pt rarely offers up any information to determine cause of INR fluctuations. Oralia Gannon, RN fills medipack. Will need to call and provide instructions after apts [phone]. INR is 1.9 today after holding last 4 days after INR jumped to >5 in hospital secondary to abx/acute sickness. States on different abx now for 7 more days and feeling better so will decrease dose to 2.5mg daily until done and continue to monitor closely.    Take 2.5mg daily while on abx then return to normal dose of 3.75mg on Tu and 2.5mg all other days  Encouraged to maintain a consistency of vegetables/salads.    Recheck INR in 1 week,     **consent form signed 10/27/2021    Referring PCP is Dr. Mona Baird results  INR (no units)   Date Value   2022 5.12 (HH)   10/05/2020 8.93 (HH)   2020 3.22 (H)   2020 2.16 (H)     INR,(POC) (no units)   Date Value   2022 1.9   2022 2.2   2022 1.5   2022 1.1   For Pharmacy Admin Tracking Only    Intervention Detail: Adherence Monitorin and Dose Adjustment: 1, reason: Interaction, Therapy Optimization  Total # of Interventions Recommended: 2  Total # of Interventions Accepted: 2  Time Spent (min): 20

## 2022-09-21 ENCOUNTER — TELEPHONE (OUTPATIENT)
Dept: PHARMACY | Age: 42
End: 2022-09-21

## 2022-09-26 NOTE — DISCHARGE INSTRUCTIONS
[address], [address]  Telephone: [phone]     Discharge Instructions     Important reminders:     **If you have any signs and symptoms of illness (Cough, fever, congestion, nausea, vomiting, diarrhea, etc.) please call the wound care center prior to your appointment. 1. Increase Protein intake for optimal wound healing  2. No added salt to reduce any swelling  3. If diabetic, maintain good glucose control  4. If you smoke, smoking prohibits wound healing, we ask that you refrain from smoking. 5. When taking antibiotics take the entire prescription as ordered. Do not stop taking until medication is all gone unless otherwise instructed. 6. Exercise as tolerated. 7. Keep weight off wounds and reposition every 2 hours if applicable. 8. If wound(s) is on your lower extremity, elevate legs to the level of the heart or above for 30 minutes 4-5 times a day and/or when sitting. Avoid standing for long periods of time. 9. Do not get wounds wet in bath or shower unless otherwise instructed by your physician. If your wound is on your foot or leg, you may purchase a cast bag. Please ask at the pharmacy. If Vascular testing is ordered, please call [address] [address] [phone]) to schedule. Vascular tests ordered by Wound Care Physicians may take up to 2 hours to complete. Please keep that in mind when scheduling. If Vascular testing is scheduled, please bring supplies to replace your dressing after testing is done. The vascular department does not stock supplies. Wound: Sacrum, Right ischium, Left ischium     With each dressing change, rinse wounds with 0.9% Saline. (May use wound wash or soft contact solution. Both can be purchased at a local drug store). If unable to obtain saline, may use a gentle soap and water. Dressing care: Right ischium- hydrocolloid- change every 2 days.  Sacrum, Left ischium- pack with Alginate ag rope (be sure to loosely pack into deepest parts & tunnels), Kerramax gentle border or 4 x 4's & tape- change every 2 days and as needed for drainage or soilage. Increase protein, continue to drink Myron. Turn & reposition every 1-2 hours and as needed for pressure relief and comfort. Up in chair for meals only. Home Care Agency/Facility:     Your wound-care supplies will be provided by: [address] f: [phone] f: [phone] p: [phone] [email or web address]    Please note, depending on your insurance coverage, you may have out-of-pocket expenses for these supplies. Someone from the company should call you to confirm your order and discuss those potential costs before they ship your products -- please anticipate that call. If your out-of-pocket cost could be substantial, Many companies have financial hardship programs for patients who qualify, so please ask about that if you might need a hand. If you have any questions about your supplies or your potential out-of-pocket costs, or if you need to place an order for a refill of supplies (typically monthly), please call the company directly. Your  is Fidel Boone     Follow up with Dr Adán Carmen In 2 week(s) in the wound care center. Wound Care Center Information: Should you experience any significant changes in your wound(s) or have questions about your wound care, please [facility] at [phone] Monday  - Thursday 8:00 am - 4:00 pm and Friday 8:00 am - 1:00pm. If you need help with your wound outside these hours and cannot wait until we are again available, contact your PCP or go to the hospital emergency room. PLEASE NOTE: IF YOU ARE UNABLE TO OBTAIN WOUND SUPPLIES, CONTINUE TO USE THE SUPPLIES YOU HAVE AVAILABLE UNTIL YOU ARE ABLE TO REACH US. IT IS MOST IMPORTANT TO KEEP THE WOUND COVERED AT ALL TIMES.      Patient Experience     Thank you for trusting us with your care. You may receive a survey from a company called [employer] asking for your feedback. We would appreciate it if you took a few minutes to share your experience.   Your input is very valuable to us

## 2022-09-27 ENCOUNTER — HOSPITAL ENCOUNTER (OUTPATIENT)
Dept: WOUND CARE | Age: 42
Discharge: HOME | End: 2022-09-27

## 2022-09-27 ENCOUNTER — ANTI-COAG VISIT (OUTPATIENT)
Dept: PHARMACY | Age: 42
End: 2022-09-27
Payer: MEDICAID

## 2022-09-27 DIAGNOSIS — Z79.01 LONG TERM (CURRENT) USE OF ANTICOAGULANTS: ICD-10-CM

## 2022-09-27 DIAGNOSIS — Z29.9 DVT PROPHYLAXIS: Primary | ICD-10-CM

## 2022-09-27 DIAGNOSIS — I26.09 OTHER ACUTE PULMONARY EMBOLISM WITH ACUTE COR PULMONALE (HCC): ICD-10-CM

## 2022-09-27 LAB — INTERNATIONAL NORMALIZATION RATIO, POC: 1.4

## 2022-09-27 PROCEDURE — 99212 OFFICE O/P EST SF 10 MIN: CPT

## 2022-09-27 PROCEDURE — 85610 PROTHROMBIN TIME: CPT

## 2022-09-27 NOTE — PROGRESS NOTES
Ms. Denys Guthrie is a 38 y.o. y/o female with history of DVT, PE in about 2010. She presents today for anticoagulation monitoring and adjustment. Pertinent PMH: DVT assoc with PICC line  Hx of multiple sclerosis,     Patient Reported Findings:  Yes     No  []   [x]       Patient verifies current dosing regimen as listed---> uses AVS --> confirms dose --> does not know dose, but takes what RN gives her but then states that she might have taken 5 mg daily but took old dose. Does not know dose, uses AVS but AVS ran out last week so she is unsure after that --> states that aide followed avs for doses --> spoke with prasanth HEWITT, 2.5 on Sun, Tues and Thurs and 5 mg all other days of the week---> [facility]  ---> held since dcd   []   [x]       S/S bleeding/bruising/swelling/SOB -denies   []   [x]       Blood in urine or stool - denies   []   [x]       Procedures scheduled in the future at this time  ---> none upcoming   [x]   []       Missed Dose -was out of town 9/22-9/25, pt states that plane shook around meds so she might have missed a few days is unsure     []   [x]       Extra Dose - denies  [x]   []       Change in medications-using perc and tylenol prn for pain with new braces- states this will continue---> takes tylenol prn ---> cefpodoxil finished several days ago, had several tylenol --> no more tylenol, no other changes in meds -->has not been taking sea rivero for a while, unclear when --> cefpodoxime from ED, no interaction---> doxy started 5/27- done today---> flagyl, ceftriaxone, and vanco she states until Friday --> finished all abx except 1 week of fluconazole --> unsure if patient is still taking cipro, new multivit and will restart biotin soon. ---> no changes---> states on Augmentin and Doxycycline for 12 days total (about 7 days left) --> finishing augmentin and doxy on Fri   []   [x]       Change in health/diet/appetite Normal vegetable is about twice a week and generally a low vitamin K. When she has broccoli it would be in a stir truong. May have a salad only once a month.---> had vegetables, no significant vit k ---> had greens twice since last visit. States that she had done a detox with tea (haven and turmeric tea)  had greens 2-3 times in the past week, no NVD --> no changes, greens 2-3 times, no NVD --> no vomiting. No greens d/t being ill. ---> had greens 3x this week --> had greens a few times, no V/D --> tried to have more vit k to help INR ---> states had more greens, diarrhea last week, no NV --> had green beans this weekend. Plans to have greens this week --> no changes--->states more greens --> no changes, no NVD  []   [x]       Change in alcohol use adamant that she did not drink alcohol while in [place] --> states now that she had one glass of alcohol in [facility]. none recently --> denies---> had a large mixed drink in last week---> denies    []   [x]       Change in activity  []   [x]       Hospital admission- 9/8- 9/10 for stomach pain and got abx. INR was 2.7 on admit then got abx and 5.2 on discharge so she held     9/8- 2.5mg  [x]   []       Emergency department visit- at [facility] 5/13-5/14 for UTI. INR 2. 9. was prescribed cefpodoxime. Was given warfarin 2.5 mg in hospital      5/21- spasms, hypomag   5/24-CP, was told to take 1.5 pills of warfarin and recheck INR dt INR of 1.4    7/5 for UTI. Was prescribed     9/14 for hypotension  []   [x]       Other complaints   Clinical Outcomes:  Yes     No  []   [x]       Major bleeding event  []   [x]       Thromboembolic event    Duration of warfarin therapy: Indefinitely  INR Range:  2.0-3.0     Continues to miss apts and miss doses. Pt rarely offers up any information to determine cause of INR fluctuations. Carri Taylor, RN fills aracelis. Will need to call and provide instructions after apts [phone].     INR is 1.4 today   Unclear how many days pt has missed doses   Take 5 mg tonight and tomorrow then return to normal dose of 3.75mg on Tues and 2.5mg all other days  Encouraged to maintain a consistency of vegetables/salads.    Recheck INR in 1 week, 10/4    **consent form signed 10/27/2021    Referring PCP is Dr. Mona Baird results  INR (no units)   Date Value   2022 5.12 (HH)   10/05/2020 8.93 (HH)   2020 3.22 (H)   2020 2.16 (H)     INR,(POC) (no units)   Date Value   2022 1.9   2022 2.2   2022 1.5   2022 1.1     For Pharmacy Admin Tracking Only    Intervention Detail: Adherence Monitorin and Dose Adjustment: 1, reason: Interaction, Therapy Optimization  Total # of Interventions Recommended: 2  Total # of Interventions Accepted: 2  Time Spent (min): 20

## 2022-10-04 ENCOUNTER — HOSPITAL ENCOUNTER (OUTPATIENT)
Dept: WOUND CARE | Age: 42
Discharge: HOME | End: 2022-10-04
Payer: MEDICAID

## 2022-10-04 ENCOUNTER — ANTI-COAG VISIT (OUTPATIENT)
Dept: PHARMACY | Age: 42
End: 2022-10-04
Payer: MEDICAID

## 2022-10-04 VITALS — SYSTOLIC BLOOD PRESSURE: 95 MMHG | HEART RATE: 107 BPM | TEMPERATURE: 96.6 F | DIASTOLIC BLOOD PRESSURE: 65 MMHG

## 2022-10-04 DIAGNOSIS — I26.09 OTHER ACUTE PULMONARY EMBOLISM WITH ACUTE COR PULMONALE (HCC): ICD-10-CM

## 2022-10-04 DIAGNOSIS — Z29.9 DVT PROPHYLAXIS: Primary | ICD-10-CM

## 2022-10-04 DIAGNOSIS — Z79.01 LONG TERM (CURRENT) USE OF ANTICOAGULANTS: ICD-10-CM

## 2022-10-04 DIAGNOSIS — L89.154 SACRAL DECUBITUS ULCER, STAGE IV (HCC): Primary | ICD-10-CM

## 2022-10-04 LAB — INTERNATIONAL NORMALIZATION RATIO, POC: 1.5

## 2022-10-04 PROCEDURE — 85610 PROTHROMBIN TIME: CPT

## 2022-10-04 PROCEDURE — 11042 DBRDMT SUBQ TIS 1ST 20SQCM/<: CPT | Performed by: SURGERY

## 2022-10-04 PROCEDURE — 11042 DBRDMT SUBQ TIS 1ST 20SQCM/<: CPT

## 2022-10-04 PROCEDURE — 99212 OFFICE O/P EST SF 10 MIN: CPT

## 2022-10-04 RX ORDER — CLOBETASOL PROPIONATE 0.5 MG/G
OINTMENT TOPICAL ONCE
Status: CANCELLED | OUTPATIENT
Start: 2022-10-04 | End: 2022-10-04

## 2022-10-04 RX ORDER — LIDOCAINE 40 MG/G
CREAM TOPICAL ONCE
Status: CANCELLED | OUTPATIENT
Start: 2022-10-04 | End: 2022-10-04

## 2022-10-04 RX ORDER — BETAMETHASONE DIPROPIONATE 0.05 %
OINTMENT (GRAM) TOPICAL ONCE
Status: CANCELLED | OUTPATIENT
Start: 2022-10-04 | End: 2022-10-04

## 2022-10-04 RX ORDER — BACITRACIN ZINC AND POLYMYXIN B SULFATE 500; 1000 [USP'U]/G; [USP'U]/G
OINTMENT TOPICAL ONCE
Status: CANCELLED | OUTPATIENT
Start: 2022-10-04 | End: 2022-10-04

## 2022-10-04 RX ORDER — GINSENG 100 MG
CAPSULE ORAL ONCE
Status: CANCELLED | OUTPATIENT
Start: 2022-10-04 | End: 2022-10-04

## 2022-10-04 RX ORDER — LIDOCAINE 50 MG/G
OINTMENT TOPICAL ONCE
Status: CANCELLED | OUTPATIENT
Start: 2022-10-04 | End: 2022-10-04

## 2022-10-04 RX ORDER — BACITRACIN, NEOMYCIN, POLYMYXIN B 400; 3.5; 5 [USP'U]/G; MG/G; [USP'U]/G
OINTMENT TOPICAL ONCE
Status: CANCELLED | OUTPATIENT
Start: 2022-10-04 | End: 2022-10-04

## 2022-10-04 RX ORDER — LIDOCAINE HYDROCHLORIDE 20 MG/ML
JELLY TOPICAL ONCE
Status: CANCELLED | OUTPATIENT
Start: 2022-10-04 | End: 2022-10-04

## 2022-10-04 RX ORDER — GENTAMICIN SULFATE 1 MG/G
OINTMENT TOPICAL ONCE
Status: CANCELLED | OUTPATIENT
Start: 2022-10-04 | End: 2022-10-04

## 2022-10-04 RX ORDER — LIDOCAINE HYDROCHLORIDE 40 MG/ML
SOLUTION TOPICAL ONCE
Status: CANCELLED | OUTPATIENT
Start: 2022-10-04 | End: 2022-10-04

## 2022-10-04 RX ORDER — LIDOCAINE 40 MG/G
CREAM TOPICAL ONCE
Status: DISCONTINUED | OUTPATIENT
Start: 2022-10-04 | End: 2022-10-05 | Stop reason: HOSPADM

## 2022-10-04 NOTE — PLAN OF CARE
Discharge instructions given. Patient verbalized understanding. Return to [address] [place] [address],[address] in 2 week(s).   Called/faxed orders to [facility]

## 2022-10-04 NOTE — PROGRESS NOTES
Ms. Katalina Laughlin is a 38 y.o. y/o female with history of DVT, PE in about 2010. She presents today for anticoagulation monitoring and adjustment. Pertinent PMH: DVT assoc with PICC line  Hx of multiple sclerosis,     Patient Reported Findings:  Yes     No  []   [x]       Patient verifies current dosing regimen as listed---> uses AVS --> confirms dose --> does not know dose, but takes what RN gives her but then states that she might have taken 5 mg daily but took old dose. Does not know dose, uses AVS but AVS ran out last week so she is unsure after that --> states that aide followed avs for doses --> spoke with prasanth HEWITT, 2.5 on Sun, Tues and Thurs and 5 mg all other days of the week---> [facility]  ---> held since [facility] --> unable to verify   []   [x]       S/S bleeding/bruising/swelling/SOB -denies   []   [x]       Blood in urine or stool - denies   []   [x]       Procedures scheduled in the future at this time  ---> none upcoming   []   [x]       Missed Dose -denies this week  []   [x]       Extra Dose - denies  [x]   []       Change in medications-using perc and tylenol prn for pain with new braces- states this will continue---> takes tylenol prn ---> cefpodoxil finished several days ago, had several tylenol --> no more tylenol, no other changes in meds -->unsure if patient is still taking cipro, new multivit and will restart biotin soon. ---> no changes---> states on Augmentin and Doxycycline for 12 days total (about 7 days left) --> finishing augmentin and doxy on Fri --> no changes, no abx   [x]   []       Change in health/diet/appetite Normal vegetable is about twice a week and generally a low vitamin K. When she has broccoli it would be in a stir truong. May have a salad only once a month.---> had vegetables, no significant vit k ---> had greens twice since last visit.  States that she had done a detox with tea (haven and turmeric tea)  had greens 2-3 times in the past week, no NVD --> no changes, greens 2-3 times, no NVD --> no vomiting. No greens d/t being ill. ---> had greens 3x this week --> had greens a few times, no V/D --> tried to have more vit k to help INR ---> states had more greens, diarrhea last week, no NV --> had green beans this weekend. Plans to have greens this week --> no changes--->states more greens --> had salad, greens and cabbage this week   []   [x]       Change in alcohol use adamant that she did not drink alcohol while in [place] --> states now that she had one glass of alcohol in [place]. none recently --> denies---> had a large mixed drink in last week---> denies    []   [x]       Change in activity  []   [x]       Hospital admission- 9/8- 9/10 for stomach pain and got abx. INR was 2.7 on admit then got abx and 5.2 on discharge so she held     9/8- 2.5mg  [x]   []       Emergency department visit- at [facility] 5/13-5/14 for UTI. INR 2. 9. was prescribed cefpodoxime. Was given warfarin 2.5 mg in hospital      5/21- spasms, hypomag   5/24-CP, was told to take 1.5 pills of warfarin and recheck INR dt INR of 1.4    7/5 for UTI. Was prescribed     9/14 for hypotension  []   [x]       Other complaints   Clinical Outcomes:  Yes     No  []   [x]       Major bleeding event  []   [x]       Thromboembolic event    Duration of warfarin therapy: Indefinitely  INR Range:  2.0-3.0     Continues to miss apts and miss doses. Pt rarely offers up any information to determine cause of INR fluctuations. Popeye Moreland, RN fills [employer]. Will need to call and provide instructions after apts [phone]. INR is 1.5 today possibly from more vit k   Take 5 mg tonight and tomorrow then increase weekly dose to 3.75mg on Tues and Fri and 2.5mg all other days (6% inc)  Encouraged to maintain a consistency of vegetables/salads.    Recheck INR in 1 week, 10/13    **consent form signed 10/27/2021    Referring PCP is Dr. Carmelo Hopkins results  INR (no units)   Date Value   06/17/2022 5.12 ([facility])   10/05/2020 8.93 (HH)   2020 3.22 (H)   2020 2.16 (H)     INR,(POC) (no units)   Date Value   2022 1.4   2022 1.9   2022 2.2   2022 1.5     For Pharmacy Admin Tracking Only    Intervention Detail: Adherence Monitorin and Dose Adjustment: 1, reason: Interaction, Therapy Optimization  Total # of Interventions Recommended: 2  Total # of Interventions Accepted: 2  Time Spent (min): 20

## 2022-10-04 NOTE — DISCHARGE INSTRUCTIONS
[facility], [address] [place] [address] Road  Telephone: [phone]     Discharge Instructions     Important reminders:     **If you have any signs and symptoms of illness (Cough, fever, congestion, nausea, vomiting, diarrhea, etc.) please call the wound care center prior to your appointment. 1. Increase Protein intake for optimal wound healing  2. No added salt to reduce any swelling  3. If diabetic, maintain good glucose control  4. If you smoke, smoking prohibits wound healing, we ask that you refrain from smoking. 5. When taking antibiotics take the entire prescription as ordered. Do not stop taking until medication is all gone unless otherwise instructed. 6. Exercise as tolerated. 7. Keep weight off wounds and reposition every 2 hours if applicable. 8. If wound(s) is on your lower extremity, elevate legs to the level of the heart or above for 30 minutes 4-5 times a day and/or when sitting. Avoid standing for long periods of time. 9. Do not get wounds wet in bath or shower unless otherwise instructed by your physician. If your wound is on your foot or leg, you may purchase a cast bag. Please ask at the pharmacy. If Vascular testing is ordered, please call [address] [place] [address] [phone]) to schedule. Vascular tests ordered by Wound Care Physicians may take up to 2 hours to complete. Please keep that in mind when scheduling. If Vascular testing is scheduled, please bring supplies to replace your dressing after testing is done. The vascular department does not stock supplies. Wound: Sacrum, Right ischium, Left ischium     With each dressing change, rinse wounds with 0.9% Saline. (May use wound wash or soft contact solution. Both can be purchased at a local drug store). If unable to obtain saline, may use a gentle soap and water.      Dressing care: Sacrum, Left ischium- pack with Alginate ag rope (be sure to loosely pack into deepest parts & tunnels), Kerramax gentle border or 4 x 4's & tape- change every 2 days and as needed for drainage or soilage. Increase protein, continue to drink Myron. Turn & reposition every 1-2 hours and as needed for pressure relief and comfort. Up in chair for meals only. Home Care Agency/Facility:     Your wound-care supplies will be provided by: [address] f: [phone] f: [phone] p: [phone] [email or web address]    Please note, depending on your insurance coverage, you may have out-of-pocket expenses for these supplies. Someone from the company should call you to confirm your order and discuss those potential costs before they ship your products -- please anticipate that call. If your out-of-pocket cost could be substantial, Many companies have financial hardship programs for patients who qualify, so please ask about that if you might need a hand. If you have any questions about your supplies or your potential out-of-pocket costs, or if you need to place an order for a refill of supplies (typically monthly), please call the company directly. Your  is Rahul Mcintyre     Follow up with Dr Staci Gillette In 2 week(s) in the wound care center. Wound Care Center Information: Should you experience any significant changes in your wound(s) or have questions about your wound care, please [facility] the [facility] at [phone] Monday  - Thursday 8:00 am - 4:00 pm and Friday 8:00 am - 1:00pm. If you need help with your wound outside these hours and cannot wait until we are again available, contact your PCP or go to the hospital emergency room. PLEASE NOTE: IF YOU ARE UNABLE TO OBTAIN WOUND SUPPLIES, CONTINUE TO USE THE SUPPLIES YOU HAVE AVAILABLE UNTIL YOU ARE ABLE TO REACH US. IT IS MOST IMPORTANT TO KEEP THE WOUND COVERED AT ALL TIMES. Patient Experience     Thank you for trusting us with your care.   You may receive a survey from a company called [employer] asking for [employer]. We would appreciate it if you took a few minutes to share your experience.   Your input is very valuable to us

## 2022-10-04 NOTE — PROGRESS NOTES
[address] [facility]:      [address] f: [phone] f: [phone] p: [phone] [email or web address]     Ordering Center: Anna Bowden [address] [place] [address]  [phone]  Dept: [phone]   Fax# [phone]    Patient Information:      [address] [address] [place] [address]   [phone]   : 1980  AGE: 38 y.o. GENDER: female   TODAYS DATE:  10/4/2022    Insurance:      PRIMARY INSURANCE:  Plan: MEDICAID [facility] DEPT OF JOB  Coverage: [place]  Effective Date: 2015  Group Number: [unfilled]  Subscriber Number: [id number] - (Medicaid)    Payer/Plan Subscr  Sex Relation Sub. Ins. ID Effective Group Num   1.  [place] -* DEMARCO SCHWARZ 1980 Female Self [id number] 1/1/15                                    [address]         Patient Wound Information:     Additional ICD-10 Codes:     Patient Active Problem List   Diagnosis Code    DVT prophylaxis Z29.9    Pulmonary emboli ([address] I26.99    Long term (current) use of anticoagulants Z79.01    Sacral decubitus ulcer, stage IV ([address] L89.154       WOUNDS REQUIRING DRESSING SUPPLIES:     Wound 22 Sacrum #2 (Active)   Wound Image   22 0930   Wound Etiology Pressure Stage 3 10/04/22 1014   Wound Cleansed Cleansed with saline 10/04/22 1014   Dressing/Treatment Alginate with Ag;Dry dressing 22 1032   Wound Length (cm) 1.3 cm 10/04/22 1014   Wound Width (cm) 0.5 cm 10/04/22 1014   Wound Depth (cm) 1 cm 10/04/22 1014   Wound Surface Area (cm^2) 0.65 cm^2 10/04/22 1014   Change in Wound Size % (l*w) 45.38 10/04/22 1014   Wound Volume (cm^3) 0.65 cm^3 10/04/22 1014   Wound Healing % -37 10/04/22 1014   Post-Procedure Length (cm) 1.3 cm 10/04/22 1030   Post-Procedure Width (cm) 0.5 cm 10/04/22 1030   Post-Procedure Depth (cm) 1 cm 10/04/22 1030   Post-Procedure Surface Area (cm^2) 0.65 cm^2 10/04/22 1030 Post-Procedure Volume (cm^3) 0.65 cm^3 10/04/22 1030   Wound Assessment Bleeding 10/04/22 1030   Drainage Amount Moderate 10/04/22 1030   Drainage Description Serosanguinous 10/04/22 1014   Odor None 10/04/22 1014   Merlyn-wound Assessment Dry/flaky 10/04/22 1014   Margins Defined edges 10/04/22 1014   Wound Thickness Description not for Pressure Injury Full thickness 10/04/22 1014   Number of days: 154       Wound 05/03/22 Ischium Left #3 (Active)   Wound Image   08/23/22 0930   Wound Etiology Pressure Stage 4 10/04/22 1014   Wound Cleansed Cleansed with saline 10/04/22 1014   Dressing/Treatment Alginate with Ag;Dry dressing 09/13/22 1032   Wound Length (cm) 0.7 cm 10/04/22 1014   Wound Width (cm) 0.5 cm 10/04/22 1014   Wound Depth (cm) 2 cm 10/04/22 1014   Wound Surface Area (cm^2) 0.35 cm^2 10/04/22 1014   Change in Wound Size % (l*w) 99.13 10/04/22 1014   Wound Volume (cm^3) 0.7 cm^3 10/04/22 1014   Wound Healing % 96 10/04/22 1014   Post-Procedure Length (cm) 0.7 cm 10/04/22 1030   Post-Procedure Width (cm) 0.5 cm 10/04/22 1030   Post-Procedure Depth (cm) 2 cm 10/04/22 1030   Post-Procedure Surface Area (cm^2) 0.35 cm^2 10/04/22 1030   Post-Procedure Volume (cm^3) 0.7 cm^3 10/04/22 1030   Distance Tunneling (cm) 8 cm 08/30/22 1007   Tunneling Position ___ O'Clock 1200 08/30/22 1007   Wound Assessment Bleeding 10/04/22 1030   Drainage Amount Moderate 10/04/22 1030   Drainage Description Serosanguinous 10/04/22 1014   Odor None 10/04/22 1014   Merlyn-wound Assessment Dry/flaky 10/04/22 1014   Margins Defined edges 10/04/22 1014   Wound Thickness Description not for Pressure Injury Full thickness 10/04/22 1014   Number of days: 154          Supplies Requested :      DISPENSE AS WRITTEN    WOUND #: 2 and 3   PRIMARY DRESSING:    Alginate with silver rope   Cover and Secure with: 4X4 non woven gauze pad  ABD pad     FREQUENCY OF DRESSING CHANGES:  Daily    Wound Thickness [x] Full   []Partial       Patient Wound(s) Debrided: [x] Yes   [] No    Debridement Date: 10/4/2022    Debribement Type: Excisional/Sharp    ADDITIONAL ITEMS:  [] Gloves Small  [x] Gloves Medium [] Gloves Large [] Gloves XLarge [] Paper Tape 2\" [] Paper Tape 3\"  [] Medipore Tape 3\" [x] Medipore Tape 4\"    [] Hypofix skin sensitive tape 2\"  [] Hypofix skin sensitive tape 4\"  [x] Saline  [] Skin Prep   [] Adhesive Remover   [] Cotton Tip Applicators  [] Tubular Stocking   [] Size E  [] Size G  [] Other:    Patient currently being seen by Home Health: [] Yes   [x] No    Quantity of days dispensed:  []15  [x]30  []60  []90 Days    Order valid for 90 days    Provider Information:      PROVIDER'S NAME/NPI  Estela Larose MD NPI: [id number]   I give permission to coordinate the care for this patient

## 2022-10-04 NOTE — PROGRESS NOTES
Marisel Rivero  AGE: 38 y.o. GENDER: female  : 1980  TODAY'S DATE:  10/4/2022    Chief Complaint   Patient presents with    Wound Check     Buttock,  left hip        HISTORY of PRESENT ILLNESS HPI     Marisel Rivero is a 38 y.o. female who presents today for wound evaluation. History of Wound: Pressure ulcers  Wound Pain:  moderate  Severity:  4 / 10   Wound Type:  pressure  Modifying Factors:  decreased mobility  Associated Signs/Symptoms:  none    Procedure Note    Performed by: Rosario Castro MD    Consent obtained: Yes    Time out taken:  Yes    Pain Control: Anesthetic  Anesthetic: 4% Lidocaine Cream     Debridement:Excisional Debridement    Using curette the wound was sharply debrided    down through and including the removal of subcutaneous tissue.         Devitalized Tissue Debrided:  necrotic/eschar    Pre Debridement Measurements:  Are located in the Wound Documentation Flow Sheet    Wound #: 2 and 3     Post  Debridement Measurements:  Wound 22 Sacrum #2 (Active)   Wound Image   22 0930   Wound Etiology Pressure Stage 3 10/04/22 1014   Wound Cleansed Cleansed with saline 10/04/22 1014   Dressing/Treatment Alginate with Ag;Dry dressing 22 1032   Wound Length (cm) 1.3 cm 10/04/22 1014   Wound Width (cm) 0.5 cm 10/04/22 1014   Wound Depth (cm) 1 cm 10/04/22 1014   Wound Surface Area (cm^2) 0.65 cm^2 10/04/22 1014   Change in Wound Size % (l*w) 45.38 10/04/22 1014   Wound Volume (cm^3) 0.65 cm^3 10/04/22 1014   Wound Healing % -37 10/04/22 1014   Post-Procedure Length (cm) 1.3 cm 10/04/22 1030   Post-Procedure Width (cm) 0.5 cm 10/04/22 1030   Post-Procedure Depth (cm) 1 cm 10/04/22 1030   Post-Procedure Surface Area (cm^2) 0.65 cm^2 10/04/22 1030   Post-Procedure Volume (cm^3) 0.65 cm^3 10/04/22 1030   Wound Assessment Bleeding 10/04/22 1030   Drainage Amount Moderate 10/04/22 1030   Drainage Description Serosanguinous 10/04/22 1014   Odor None 10/04/22 1014   Merlyn-wound Assessment Dry/flaky 10/04/22 1014   Margins Defined edges 10/04/22 1014   Wound Thickness Description not for Pressure Injury Full thickness 10/04/22 1014   Number of days: 154       Wound 05/03/22 Ischium Left #3 (Active)   Wound Image   08/23/22 0930   Wound Etiology Pressure Stage 4 10/04/22 1014   Wound Cleansed Cleansed with saline 10/04/22 1014   Dressing/Treatment Alginate with Ag;Dry dressing 09/13/22 1032   Wound Length (cm) 0.7 cm 10/04/22 1014   Wound Width (cm) 0.5 cm 10/04/22 1014   Wound Depth (cm) 2 cm 10/04/22 1014   Wound Surface Area (cm^2) 0.35 cm^2 10/04/22 1014   Change in Wound Size % (l*w) 99.13 10/04/22 1014   Wound Volume (cm^3) 0.7 cm^3 10/04/22 1014   Wound Healing % 96 10/04/22 1014   Post-Procedure Length (cm) 0.7 cm 10/04/22 1030   Post-Procedure Width (cm) 0.5 cm 10/04/22 1030   Post-Procedure Depth (cm) 2 cm 10/04/22 1030   Post-Procedure Surface Area (cm^2) 0.35 cm^2 10/04/22 1030   Post-Procedure Volume (cm^3) 0.7 cm^3 10/04/22 1030   Distance Tunneling (cm) 8 cm 08/30/22 1007   Tunneling Position ___ O'Clock 1200 08/30/22 1007   Wound Assessment Bleeding 10/04/22 1030   Drainage Amount Moderate 10/04/22 1030   Drainage Description Serosanguinous 10/04/22 1014   Odor None 10/04/22 1014   Merlyn-wound Assessment Dry/flaky 10/04/22 1014   Margins Defined edges 10/04/22 1014   Wound Thickness Description not for Pressure Injury Full thickness 10/04/22 1014   Number of days: 154           Total Surface Area Debrided:  1 sq cm     Bleeding:  Minimal    Hemostasis Achieved:  by pressure    Procedural Pain:  3  / 10     Post Procedural Pain:  0 / 10     Response to treatment:  Well tolerated by patient. The nature of the patient's condition was explained in depth.  The patient was informed that their compliance to the treatment plan is paramount to successful healing and prevention of further ulceration and/or infection     Treatment Plan: 53-year-old female seen in follow-up for stage IV pressure ulcers of the sacral region and left ischial region. The ischial ulcer has made excellent progress. The sacral ulcer is stable to minimally improved. Continue current dressing changes. Follow-up in the wound center 2 weeks.     Rigoberto Ramirez MD, M.D.  10/4/2022

## 2022-10-10 ENCOUNTER — TELEPHONE (OUTPATIENT)
Dept: PHARMACY | Age: 42
End: 2022-10-10

## 2022-10-10 NOTE — TELEPHONE ENCOUNTER
Patient called and rescheduled for 10/11. Pool [facility] places for [facility] to call back on Cipro.

## 2022-10-10 NOTE — TELEPHONE ENCOUNTER
----- Message from Aarti Montrell sent at 10/10/2022  2:33 PM EDT -----  Regarding: new antibiotic  Contact: patient  Patient started Cipro x2 per day. She does not know the dose strength or how many days. The urology MD ordered. Please call back regarding ABX.  Patient r/s to come back tomorrow, 10/11.  [phone]

## 2022-10-10 NOTE — TELEPHONE ENCOUNTER
Patient started cipro Saturday 10/8 for 1 week. Will be coming tomorrow, 10/11 so will check INR prior to adjusting dose as patient rarely therapeutic. Pt state she may also get an rx for fluconazole x1 but will let us know tomorrow before starting it if she does.     Annette Dao, PharmD, [facility]

## 2022-10-11 ENCOUNTER — APPOINTMENT (OUTPATIENT)
Dept: PHARMACY | Age: 42
End: 2022-10-11
Payer: MEDICAID

## 2022-10-11 ENCOUNTER — ANTI-COAG VISIT (OUTPATIENT)
Dept: PHARMACY | Age: 42
End: 2022-10-11
Payer: MEDICAID

## 2022-10-11 DIAGNOSIS — Z79.01 LONG TERM (CURRENT) USE OF ANTICOAGULANTS: ICD-10-CM

## 2022-10-11 DIAGNOSIS — I26.09 OTHER ACUTE PULMONARY EMBOLISM WITH ACUTE COR PULMONALE (HCC): ICD-10-CM

## 2022-10-11 DIAGNOSIS — Z29.9 DVT PROPHYLAXIS: Primary | ICD-10-CM

## 2022-10-11 LAB — INTERNATIONAL NORMALIZATION RATIO, POC: 1.7

## 2022-10-11 PROCEDURE — 85610 PROTHROMBIN TIME: CPT

## 2022-10-11 PROCEDURE — 99212 OFFICE O/P EST SF 10 MIN: CPT

## 2022-10-11 NOTE — PROGRESS NOTES
Ms. Jess Foster is a 38 y.o. y/o female with history of DVT, PE in about 2010. She presents today for anticoagulation monitoring and adjustment. Pertinent PMH: DVT assoc with PICC line  Hx of multiple sclerosis,     Patient Reported Findings:  Yes     No  []   [x]       Patient verifies current dosing regimen as listed---> does not know dose, but takes what RN gives her but then states that she might have taken 5 mg daily but took old dose. Does not know dose, uses AVS but AVS ran out last week so she is unsure after that --> states that aide followed avs for doses --> unable to verify   []   [x]       S/S bleeding/bruising/swelling/SOB -denies   []   [x]       Blood in urine or stool - denies   []   [x]       Procedures scheduled in the future at this time  ---> none upcoming   []   [x]       Missed Dose -denies this week  []   [x]       Extra Dose - denies  [x]   []       Change in medications-using perc and tylenol prn for pain with new braces- states this will continue---> takes tylenol prn ---> cefpodoxil finished several days ago, had several tylenol --> no more tylenol, no other changes in meds -->unsure if patient is still taking cipro, new multivit and will restart biotin soon. ---> started cipro x 7 d on 10/8. Might take a dose of fluconazole   [x]   []       Change in health/diet/appetite Normal vegetable is about twice a week and generally a low vitamin K. When she has broccoli it would be in a stir truong. May have a salad only once a month.---> had vegetables, no significant vit k ---> had greens twice since last visit. States that she had done a detox with tea (haven and turmeric tea)  had greens 2-3 times in the past week, no NVD --> no changes, greens 2-3 times, no NVD --> no vomiting.  No greens d/t being ill. ---> had greens 3x this week --> had greens a few times, no V/D --> tried to have more vit k to help INR ---> states had more greens, diarrhea last week, no NV --> had green beans this weekend. Plans to have greens this week --> no changes--->states more greens --> had salad, greens and cabbage this week --> unsure if she had any vit k   []   [x]       Change in alcohol use adamant that she did not drink alcohol while in [place] --> states now that she had one glass of alcohol in [facility]. none recently --> denies---> had a large mixed drink in last week---> denies    []   [x]       Change in activity  []   [x]       Hospital admission- 9/8- 9/10 for stomach pain and got abx. INR was 2.7 on admit then got abx and 5.2 on discharge so she held     9/8- 2.5mg  [x]   []       Emergency department visit- at [facility] 5/13-5/14 for UTI. INR 2. 9. was prescribed cefpodoxime. Was given warfarin 2.5 mg in hospital      5/21- spasms, hypomag   5/24-CP, was told to take 1.5 pills of warfarin and recheck INR dt INR of 1.4    7/5 for UTI. Was prescribed     9/14 for hypotension  []   [x]       Other complaints   Clinical Outcomes:  Yes     No  []   [x]       Major bleeding event  []   [x]       Thromboembolic event    Duration of warfarin therapy: Indefinitely  INR Range:  2.0-3.0     Continues to miss apts and miss doses. Pt rarely offers up any information to determine cause of INR fluctuations. Tl Gould RN fills [facility]. Will need to call and provide instructions after apts [phone]. INR is 1.7 today despite increasing weekly dose and being in cipro  Take 5 mg tonight and tomorrow then increase weekly dose to 3.75mg on Sun, Tues and Fri and 2.5mg all other days (6% inc)  LVM with parsanth with dosing instructions   Encouraged to maintain a consistency of vegetables/salads.    Recheck INR in 1 week, 10/18    **consent form signed 10/27/2021    Referring PCP is Dr. Willard results  INR (no units)   Date Value   06/17/2022 5.12 (HH)   10/05/2020 8.93 (HH)   09/19/2020 3.22 (H)   07/17/2020 2.16 (H)     INR,(POC) (no units)   Date Value   10/04/2022 1.5   09/27/2022 1.4   09/13/2022 1.9 2022 2.2     For Pharmacy Admin Tracking Only    Intervention Detail: Adherence Monitorin and Dose Adjustment: 1, reason: Interaction, Therapy Optimization  Total # of Interventions Recommended: 2  Total # of Interventions Accepted: 2  Time Spent (min): 15

## 2022-10-17 NOTE — DISCHARGE INSTRUCTIONS
[address], [address] [place] [address] Road  Telephone: [phone]     Discharge Instructions     Important reminders:     **If you have any signs and symptoms of illness (Cough, fever, congestion, nausea, vomiting, diarrhea, etc.) please call the wound care center prior to your appointment. 1. Increase Protein intake for optimal wound healing  2. No added salt to reduce any swelling  3. If diabetic, maintain good glucose control  4. If you smoke, smoking prohibits wound healing, we ask that you refrain from smoking. 5. When taking antibiotics take the entire prescription as ordered. Do not stop taking until medication is all gone unless otherwise instructed. 6. Exercise as tolerated. 7. Keep weight off wounds and reposition every 2 hours if applicable. 8. If wound(s) is on your lower extremity, elevate legs to the level of the heart or above for 30 minutes 4-5 times a day and/or when sitting. Avoid standing for long periods of time. 9. Do not get wounds wet in bath or shower unless otherwise instructed by your physician. If your wound is on your foot or leg, you may purchase a cast bag. Please ask at the pharmacy. If Vascular testing is ordered, please call [address] [place] [address] [phone]) to schedule. Vascular tests ordered by Wound Care Physicians may take up to 2 hours to complete. Please keep that in mind when scheduling. If Vascular testing is scheduled, please bring supplies to replace your dressing after testing is done. The vascular department does not stock supplies. Wound: Sacrum, Right ischium, Left ischium     With each dressing change, rinse wounds with 0.9% Saline. (May use wound wash or soft contact solution. Both can be purchased at a local drug store). If unable to obtain saline, may use a gentle soap and water.      Dressing care: Sacrum, Left ischium- pack with Aquacel ag rope (be sure to loosely pack into deepest parts & tunnels), Kerramax gentle border or 4 x 4's & tape- change every 2 days and as needed for drainage or soilage. Increase protein, continue to drink Myron. Turn & reposition every 1-2 hours and as needed for pressure relief and comfort. Up in chair for meals only. Home Care Agency/Facility:     Your wound-care supplies will be provided by: [address] f: [phone] f: [phone] p: [phone] [email or web address]    Please note, depending on your insurance coverage, you may have out-of-pocket expenses for these supplies. Someone from the company should call you to confirm your order and discuss those potential costs before they ship your products -- please anticipate that call. If your out-of-pocket cost could be substantial, Many companies have financial hardship programs for patients who qualify, so please ask about that if you might need a hand. If you have any questions about your supplies or your potential out-of-pocket costs, or if you need to place an order for a refill of supplies (typically monthly), please call the company directly. Your  is Ge Zimmer     Follow up with Dr Fior Persaud In 2 week(s) in the wound care center. Wound Care Center Information: Should you experience any significant changes in your wound(s) or have questions about your wound care, [facility] at [phone] Monday  - Thursday 8:00 am - 4:00 pm and Friday 8:00 am - 1:00pm. If you need help with your wound outside these hours and cannot wait until we are again available, contact your PCP or go to the hospital emergency room. PLEASE NOTE: IF YOU ARE UNABLE TO OBTAIN WOUND SUPPLIES, CONTINUE TO USE THE SUPPLIES YOU HAVE AVAILABLE UNTIL YOU ARE ABLE TO REACH US. IT IS MOST IMPORTANT TO KEEP THE WOUND COVERED AT ALL TIMES. Patient Experience     Thank you for trusting us with your care.   You may receive a survey from a company called [employer] asking for [employer]. We would appreciate it if you took a few minutes to share your experience.   Your input is very valuable to us

## 2022-10-18 ENCOUNTER — HOSPITAL ENCOUNTER (OUTPATIENT)
Dept: WOUND CARE | Age: 42
Discharge: HOME | End: 2022-10-18
Payer: MEDICAID

## 2022-10-18 ENCOUNTER — ANTI-COAG VISIT (OUTPATIENT)
Dept: PHARMACY | Age: 42
End: 2022-10-18
Payer: MEDICAID

## 2022-10-18 DIAGNOSIS — Z79.01 LONG TERM (CURRENT) USE OF ANTICOAGULANTS: ICD-10-CM

## 2022-10-18 DIAGNOSIS — Z29.9 DVT PROPHYLAXIS: Primary | ICD-10-CM

## 2022-10-18 DIAGNOSIS — I26.09 PULMONARY EMBOLISM WITH ACUTE COR PULMONALE, UNSPECIFIED CHRONICITY, UNSPECIFIED PULMONARY EMBOLISM TYPE (HCC): ICD-10-CM

## 2022-10-18 DIAGNOSIS — L89.154 SACRAL DECUBITUS ULCER, STAGE IV (HCC): Primary | ICD-10-CM

## 2022-10-18 LAB — INTERNATIONAL NORMALIZATION RATIO, POC: 2.1

## 2022-10-18 PROCEDURE — 11042 DBRDMT SUBQ TIS 1ST 20SQCM/<: CPT | Performed by: SURGERY

## 2022-10-18 PROCEDURE — 11042 DBRDMT SUBQ TIS 1ST 20SQCM/<: CPT

## 2022-10-18 PROCEDURE — 85610 PROTHROMBIN TIME: CPT

## 2022-10-18 PROCEDURE — 99212 OFFICE O/P EST SF 10 MIN: CPT

## 2022-10-18 RX ORDER — CLOBETASOL PROPIONATE 0.5 MG/G
OINTMENT TOPICAL ONCE
OUTPATIENT
Start: 2022-10-18 | End: 2022-10-18

## 2022-10-18 RX ORDER — LIDOCAINE 50 MG/G
OINTMENT TOPICAL ONCE
OUTPATIENT
Start: 2022-10-18 | End: 2022-10-18

## 2022-10-18 RX ORDER — BETAMETHASONE DIPROPIONATE 0.05 %
OINTMENT (GRAM) TOPICAL ONCE
OUTPATIENT
Start: 2022-10-18 | End: 2022-10-18

## 2022-10-18 RX ORDER — LIDOCAINE HYDROCHLORIDE 40 MG/ML
SOLUTION TOPICAL ONCE
OUTPATIENT
Start: 2022-10-18 | End: 2022-10-18

## 2022-10-18 RX ORDER — BACITRACIN, NEOMYCIN, POLYMYXIN B 400; 3.5; 5 [USP'U]/G; MG/G; [USP'U]/G
OINTMENT TOPICAL ONCE
OUTPATIENT
Start: 2022-10-18 | End: 2022-10-18

## 2022-10-18 RX ORDER — LIDOCAINE HYDROCHLORIDE 20 MG/ML
JELLY TOPICAL ONCE
OUTPATIENT
Start: 2022-10-18 | End: 2022-10-18

## 2022-10-18 RX ORDER — BACITRACIN ZINC AND POLYMYXIN B SULFATE 500; 1000 [USP'U]/G; [USP'U]/G
OINTMENT TOPICAL ONCE
OUTPATIENT
Start: 2022-10-18 | End: 2022-10-18

## 2022-10-18 RX ORDER — LIDOCAINE 40 MG/G
CREAM TOPICAL ONCE
Status: DISCONTINUED | OUTPATIENT
Start: 2022-10-18 | End: 2022-10-19 | Stop reason: HOSPADM

## 2022-10-18 RX ORDER — LIDOCAINE 40 MG/G
CREAM TOPICAL ONCE
Status: CANCELLED | OUTPATIENT
Start: 2022-10-18 | End: 2022-10-18

## 2022-10-18 RX ORDER — GINSENG 100 MG
CAPSULE ORAL ONCE
OUTPATIENT
Start: 2022-10-18 | End: 2022-10-18

## 2022-10-18 RX ORDER — GENTAMICIN SULFATE 1 MG/G
OINTMENT TOPICAL ONCE
OUTPATIENT
Start: 2022-10-18 | End: 2022-10-18

## 2022-10-18 NOTE — PLAN OF CARE
Discharge instructions given. Patient verbalized understanding. Return to [address] [place] [address],[address] in 2 week(s).   Called/faxed orders to [facility]

## 2022-10-18 NOTE — PROGRESS NOTES
Ms. Lin Merrill is a 38 y.o. y/o female with history of DVT, PE in about 2010. She presents today for anticoagulation monitoring and adjustment. Pertinent PMH: DVT assoc with PICC line  Hx of multiple sclerosis,     Patient Reported Findings:  Yes     No  []   [x]       Patient verifies current dosing regimen as listed---> does not know dose, but takes what RN gives her but then states that she might have taken 5 mg daily but took old dose. Does not know dose, uses AVS but AVS ran out last week so she is unsure after that --> states that aide followed avs for doses --> unable to verify   []   [x]       S/S bleeding/bruising/swelling/SOB -denies   []   [x]       Blood in urine or stool - denies   []   [x]       Procedures scheduled in the future at this time  ---> none upcoming   []   [x]       Missed Dose -denies this week  []   [x]       Extra Dose - denies  [x]   []       Change in medications-using perc and tylenol prn for pain with new braces- states this will continue---> takes tylenol prn ---> cefpodoxil finished several days ago, had several tylenol --> no more tylenol, no other changes in meds -->unsure if patient is still taking cipro, new multivit and will restart biotin soon. ---> started cipro x 7 d on 10/8. Might take a dose of fluconazole---> going to take fluconazole x1 today    [x]   []       Change in health/diet/appetite Normal vegetable is about twice a week and generally a low vitamin K. When she has broccoli it would be in a stir truong. May have a salad only once a month.---> had vegetables, no significant vit k ---> had greens twice since last visit. States that she had done a detox with tea (haven and turmeric tea)  had greens 2-3 times in the past week, no NVD --> no changes, greens 2-3 times, no NVD --> no vomiting.  No greens d/t being ill. ---> had greens 3x this week --> had greens a few times, no V/D --> tried to have more vit k to help INR ---> states had more greens, diarrhea last week, no NV --> had green beans this weekend. Plans to have greens this week --> no changes--->states more greens --> had salad, greens and cabbage this week --> unsure if she had any vit k   []   [x]       Change in alcohol use adamant that she did not drink alcohol while in [place] --> states now that she had one glass of alcohol in [facility]. none recently --> denies---> had a large mixed drink in last week---> denies    []   [x]       Change in activity  []   [x]       Hospital admission- 9/8- 9/10 for stomach pain and got abx. INR was 2.7 on admit then got abx and 5.2 on discharge so she held     9/8- 2.5mg  [x]   []       Emergency department visit- at [facility] 5/13-5/14 for UTI. INR 2. 9. was prescribed cefpodoxime. Was given warfarin 2.5 mg in hospital      5/21- spasms, hypomag   5/24-CP, was told to take 1.5 pills of warfarin and recheck INR dt INR of 1.4    7/5 for UTI. Was prescribed     9/14 for hypotension  []   [x]       Other complaints   Clinical Outcomes:  Yes     No  []   [x]       Major bleeding event  []   [x]       Thromboembolic event    Duration of warfarin therapy: Indefinitely  INR Range:  2.0-3.0     Continues to miss apts and miss doses. Pt rarely offers up any information to determine cause of INR fluctuations. Joshua Hudson RN fills [facility]. Will need to call and provide instructions after apts [phone]. INR is 2.1 today after increasing weekly dose and having 25mg in past week after boosting (cannot confirm but Cynthia follows AVS). Will be doing fluconazole x1 today so will not increase dose further. Will need to continue monitoring closely and most likely increase dose to 25mg/week if drops again post fluconazole. Take 3.75mg on Sun, Tues and Fri and 2.5mg all other days. LVM with cynthia with dosing instructions. Encouraged to maintain a consistency of vegetables/salads.    Recheck INR in 1 week, 10/28 per request     **consent form signed 10/27/2021    Referring PCP is  Pascual Ramsay FAX results  INR (no units)   Date Value   2022 5.12 (HH)   10/05/2020 8.93 (HH)   2020 3.22 (H)   2020 2.16 (H)     INR,(POC) (no units)   Date Value   10/18/2022 2.1   10/11/2022 1.7   10/04/2022 1.5   2022 1.4     For Pharmacy Admin Tracking Only    Intervention Detail: Adherence Monitorin and Dose Adjustment: 1, reason: Interaction, Therapy Optimization  Total # of Interventions Recommended: 2  Total # of Interventions Accepted: 2  Time Spent (min): 15

## 2022-10-18 NOTE — PROGRESS NOTES
Areli Horne  AGE: 38 y.o. GENDER: female  : 1980  TODAY'S DATE:  10/18/2022    Chief Complaint   Patient presents with    Wound Check     Follow up pressure wounds        HISTORY of PRESENT ILLNESS HPI     Areli Horne is a 38 y.o. female who presents today for wound evaluation. History of Wound: Pressure ulcers  Wound Pain:  mild  Severity:  3 / 10   Wound Type:  pressure  Modifying Factors:  decreased mobility  Associated Signs/Symptoms:  none    Procedure Note    Performed by: Tonie Murillo MD    Consent obtained: Yes    Time out taken:  Yes    Pain Control: Anesthetic  Anesthetic: 4% Lidocaine Cream     Debridement:Excisional Debridement    Using curette the wound was sharply debrided    down through and including the removal of subcutaneous tissue.         Devitalized Tissue Debrided:  necrotic/eschar    Pre Debridement Measurements:  Are located in the Wound Documentation Flow Sheet    Wound #: 2     Post  Debridement Measurements:  Wound 22 Sacrum #2 (Active)   Wound Image   22 0930   Wound Etiology Pressure Stage 3 10/18/22 0958   Wound Cleansed Cleansed with saline 10/18/22 0958   Dressing/Treatment Alginate with Ag;Dry dressing 22 1032   Wound Length (cm) 1 cm 10/18/22 0958   Wound Width (cm) 1 cm 10/18/22 0958   Wound Depth (cm) 0.7 cm 10/18/22 0958   Wound Surface Area (cm^2) 1 cm^2 10/18/22 0958   Change in Wound Size % (l*w) 15.97 10/18/22 0958   Wound Volume (cm^3) 0.7 cm^3 10/18/22 0958   Wound Healing % -47 10/18/22 0958   Post-Procedure Length (cm) 1 cm 10/18/22 1017   Post-Procedure Width (cm) 1 cm 10/18/22 1017   Post-Procedure Depth (cm) 0.7 cm 10/18/22 1017   Post-Procedure Surface Area (cm^2) 1 cm^2 10/18/22 1017   Post-Procedure Volume (cm^3) 0.7 cm^3 10/18/22 1017   Wound Assessment Bleeding 10/18/22 1017   Drainage Amount Moderate 10/18/22 1017   Drainage Description Serosanguinous 10/18/22 0958   Odor None 10/18/22 09   Merlyn-wound Assessment Intact 10/18/22 0958   Margins Attached edges; Defined edges 10/18/22 0958   Wound Thickness Description not for Pressure Injury Full thickness 10/04/22 1014   Number of days: 168       Wound 05/03/22 Ischium Left #3 (Active)   Wound Image   08/23/22 0930   Wound Etiology Pressure Stage 4 10/18/22 0958   Wound Cleansed Cleansed with saline 10/18/22 0958   Dressing/Treatment Alginate with Ag;Dry dressing 09/13/22 1032   Wound Length (cm) 1 cm 10/18/22 0958   Wound Width (cm) 0.2 cm 10/18/22 0958   Wound Depth (cm) 7.5 cm 10/18/22 0958   Wound Surface Area (cm^2) 0.2 cm^2 10/18/22 0958   Change in Wound Size % (l*w) 99.5 10/18/22 0958   Wound Volume (cm^3) 1.5 cm^3 10/18/22 0958   Wound Healing % 91 10/18/22 0958   Post-Procedure Length (cm) 1 cm 10/18/22 1017   Post-Procedure Width (cm) 0.2 cm 10/18/22 1017   Post-Procedure Depth (cm) 7.5 cm 10/18/22 1017   Post-Procedure Surface Area (cm^2) 0.2 cm^2 10/18/22 1017   Post-Procedure Volume (cm^3) 1.5 cm^3 10/18/22 1017   Distance Tunneling (cm) 7 cm 10/18/22 0958   Tunneling Position ___ O'Clock 1500 10/18/22 0958   Wound Assessment Bleeding 10/18/22 1017   Drainage Amount Moderate 10/18/22 1017   Drainage Description Serosanguinous 10/18/22 0958   Odor None 10/18/22 0958   Merlyn-wound Assessment Intact 10/18/22 0958   Margins Defined edges 10/18/22 0958   Wound Thickness Description not for Pressure Injury Full thickness 10/04/22 1014   Number of days: 168           Total Surface Area Debrided:  1 sq cm     Bleeding:  Minimal    Hemostasis Achieved:  by pressure    Procedural Pain:  3  / 10     Post Procedural Pain:  0 / 10     Response to treatment:  Well tolerated by patient. The nature of the patient's condition was explained in depth.  The patient was informed that their compliance to the treatment plan is paramount to successful healing and prevention of further ulceration and/or infection     Treatment Plan:   80-year-old female with MS who is seen in follow-up for pressure ulcerations of the left ischial region and sacral region. The left ischial ulcer tunnels further than at her last visit. The patient reports that they have not been packing the tract. The sacral ulcer is stable to slightly improved. Continue current dressing changes at both sites. Patient was instructed to make sure that they pack the left ischial ulcer tract.     Lloyd Nelson MD, M.D.  10/18/2022

## 2022-10-27 ENCOUNTER — TELEPHONE (OUTPATIENT)
Dept: PHARMACY | Age: 42
End: 2022-10-27

## 2022-10-27 NOTE — TELEPHONE ENCOUNTER
Returned call to patient. Since has been subtherapeutic recently and at bottom of therapeutic range currently, will maintain normal weekly dose of warfarin while on cipro (will increase INR).  R/s pt to RTC 11/1 since has wound care then

## 2022-10-27 NOTE — TELEPHONE ENCOUNTER
----- Message from Fani Cruz sent at 10/27/2022 10:31 AM EDT -----  [facility] , INR of 2.0  ( verified in chart). Pt states they prescribed her Ciprofloxacin 500 mgs for 10 days. [phone].  Would like to s/w a pharmacist.

## 2022-10-28 NOTE — DISCHARGE INSTRUCTIONS
[address], [address]  Telephone: [phone]     Discharge Instructions     Important reminders:     **If you have any signs and symptoms of illness (Cough, fever, congestion, nausea, vomiting, diarrhea, etc.) please call the wound care center prior to your appointment. 1. Increase Protein intake for optimal wound healing  2. No added salt to reduce any swelling  3. If diabetic, maintain good glucose control  4. If you smoke, smoking prohibits wound healing, we ask that you refrain from smoking. 5. When taking antibiotics take the entire prescription as ordered. Do not stop taking until medication is all gone unless otherwise instructed. 6. Exercise as tolerated. 7. Keep weight off wounds and reposition every 2 hours if applicable. 8. If wound(s) is on your lower extremity, elevate legs to the level of the heart or above for 30 minutes 4-5 times a day and/or when sitting. Avoid standing for long periods of time. 9. Do not get wounds wet in bath or shower unless otherwise instructed by your physician. If your wound is on your foot or leg, you may purchase a cast bag. Please ask at the pharmacy. If Vascular testing is ordered, please call [address] [place] [address] [phone]) to schedule. Vascular tests ordered by Wound Care Physicians may take up to 2 hours to complete. Please keep that in mind when scheduling. If Vascular testing is scheduled, please bring supplies to replace your dressing after testing is done. The vascular department does not stock supplies. Wound: Sacrum, Right ischium, Left ischium     With each dressing change, rinse wounds with 0.9% Saline. (May use wound wash or soft contact solution. Both can be purchased at a local drug store). If unable to obtain saline, may use a gentle soap and water.      Dressing care: Left ischium- moist to dry with roll gauze (use only one piece to pack all around wound & leave tail to be able to remove) dry dressing- change every 2 days. Sacrum- pack with Aquacel ag rope, 4 x 4's & tape- change every 2 days and as needed for drainage or soilage. Increase protein, continue to drink Myron. Turn & reposition every 1-2 hours and as needed for pressure relief and comfort. Up in chair for meals only. Home Care Agency/Facility:     Your wound-care supplies will be provided by: [address] f: [phone] f: [phone] p: [phone] [email or web address]    Please note, depending on your insurance coverage, you may have out-of-pocket expenses for these supplies. Someone from the company should call you to confirm your order and discuss those potential costs before they ship your products -- please anticipate that call. If your out-of-pocket cost could be substantial, Many companies have financial hardship programs for patients who qualify, so please ask about that if you might need a hand. If you have any questions about your supplies or your potential out-of-pocket costs, or if you need to place an order for a refill of supplies (typically monthly), please call the company directly. Your  is [place]     Follow up with Dr Jonathan Aldana In 1 week(s) in the wound care center. Wound Care Center Information: Should you experience any significant changes in your wound(s) or have questions about your wound care, please contact the [facility] at [phone] Monday  - Thursday 8:00 am - 4:00 pm and Friday 8:00 am - 1:00pm. If you need help with your wound outside these hours and cannot wait until we are again available, contact your PCP or go to the hospital emergency room. PLEASE NOTE: IF YOU ARE UNABLE TO OBTAIN WOUND SUPPLIES, CONTINUE TO USE THE SUPPLIES YOU HAVE AVAILABLE UNTIL YOU ARE ABLE TO REACH US. IT IS MOST IMPORTANT TO KEEP THE WOUND COVERED AT ALL TIMES.      Patient Experience     Thank you for trusting us with your care. You may receive a survey from a company called [employer] asking for your feedback. We would appreciate it if you took a few minutes to share your experience.   Your input is very valuable to us

## 2022-10-31 ENCOUNTER — TELEPHONE (OUTPATIENT)
Dept: PHARMACY | Age: 42
End: 2022-10-31

## 2022-10-31 NOTE — TELEPHONE ENCOUNTER
----- Message from Petra Muro sent at 10/31/2022  8:04 AM EDT -----  Regarding: new medication  Contact: Patient  Patient's antibiotic is switching to Levofloxin, she believes it is for 7 days, in addition to the doxycycline. Please call her back re: warfarin [phone].

## 2022-11-01 ENCOUNTER — APPOINTMENT (OUTPATIENT)
Dept: PHARMACY | Age: 42
End: 2022-11-01
Payer: MEDICAID

## 2022-11-01 ENCOUNTER — HOSPITAL ENCOUNTER (OUTPATIENT)
Dept: WOUND CARE | Age: 42
Discharge: HOME | End: 2022-11-01
Payer: MEDICAID

## 2022-11-01 VITALS
HEART RATE: 119 BPM | RESPIRATION RATE: 15 BRPM | WEIGHT: 97 LBS | SYSTOLIC BLOOD PRESSURE: 101 MMHG | DIASTOLIC BLOOD PRESSURE: 72 MMHG | BODY MASS INDEX: 17.74 KG/M2

## 2022-11-01 DIAGNOSIS — L89.154 SACRAL DECUBITUS ULCER, STAGE IV (HCC): Primary | ICD-10-CM

## 2022-11-01 PROCEDURE — 11042 DBRDMT SUBQ TIS 1ST 20SQCM/<: CPT | Performed by: SURGERY

## 2022-11-01 PROCEDURE — 11042 DBRDMT SUBQ TIS 1ST 20SQCM/<: CPT

## 2022-11-01 RX ORDER — BACITRACIN ZINC AND POLYMYXIN B SULFATE 500; 1000 [USP'U]/G; [USP'U]/G
OINTMENT TOPICAL ONCE
Status: CANCELLED | OUTPATIENT
Start: 2022-11-01 | End: 2022-11-01

## 2022-11-01 RX ORDER — GINSENG 100 MG
CAPSULE ORAL ONCE
Status: CANCELLED | OUTPATIENT
Start: 2022-11-01 | End: 2022-11-01

## 2022-11-01 RX ORDER — GENTAMICIN SULFATE 1 MG/G
OINTMENT TOPICAL ONCE
Status: CANCELLED | OUTPATIENT
Start: 2022-11-01 | End: 2022-11-01

## 2022-11-01 RX ORDER — LIDOCAINE 40 MG/G
CREAM TOPICAL ONCE
Status: CANCELLED | OUTPATIENT
Start: 2022-11-01 | End: 2022-11-01

## 2022-11-01 RX ORDER — BACITRACIN, NEOMYCIN, POLYMYXIN B 400; 3.5; 5 [USP'U]/G; MG/G; [USP'U]/G
OINTMENT TOPICAL ONCE
Status: CANCELLED | OUTPATIENT
Start: 2022-11-01 | End: 2022-11-01

## 2022-11-01 RX ORDER — LIDOCAINE HYDROCHLORIDE 20 MG/ML
JELLY TOPICAL ONCE
Status: CANCELLED | OUTPATIENT
Start: 2022-11-01 | End: 2022-11-01

## 2022-11-01 RX ORDER — CLOBETASOL PROPIONATE 0.5 MG/G
OINTMENT TOPICAL ONCE
Status: CANCELLED | OUTPATIENT
Start: 2022-11-01 | End: 2022-11-01

## 2022-11-01 RX ORDER — BETAMETHASONE DIPROPIONATE 0.05 %
OINTMENT (GRAM) TOPICAL ONCE
Status: CANCELLED | OUTPATIENT
Start: 2022-11-01 | End: 2022-11-01

## 2022-11-01 RX ORDER — LIDOCAINE HYDROCHLORIDE 40 MG/ML
SOLUTION TOPICAL ONCE
Status: CANCELLED | OUTPATIENT
Start: 2022-11-01 | End: 2022-11-01

## 2022-11-01 RX ORDER — LIDOCAINE 40 MG/G
CREAM TOPICAL ONCE
Status: DISCONTINUED | OUTPATIENT
Start: 2022-11-01 | End: 2022-11-02 | Stop reason: HOSPADM

## 2022-11-01 RX ORDER — LIDOCAINE 50 MG/G
OINTMENT TOPICAL ONCE
Status: CANCELLED | OUTPATIENT
Start: 2022-11-01 | End: 2022-11-01

## 2022-11-01 NOTE — TELEPHONE ENCOUNTER
Pt called to cancel CC appt today, will be at wound care. Pt will call to see if we have any cancellations today if not will call back to r/s for another day.

## 2022-11-01 NOTE — PLAN OF CARE
Discharge instructions given. Patient verbalized understanding. Return to [facility] in 1 week(s).   Called/faxed orders to [facility]

## 2022-11-01 NOTE — PROGRESS NOTES
[address] [facility] Floor:      [address] f: [phone] f: [phone] p: [phone] [email or web address]     Ordering Center: [address]  [facility] [address]  [phone]  Dept: [phone]   Fax# [phone]    Patient Information:      Amy  [address] [facility] [address] [place] [address]   [phone]   : 1980  AGE: 38 y.o. GENDER: female   TODAYS DATE:  2022    Insurance:      PRIMARY INSURANCE:  Plan: MEDICAID [facility] DEPT OF JOB  Coverage: [place]  Effective Date: 2015  Group Number: [unfilled]  Subscriber Number: [id number] - (Medicaid)    Payer/Plan Subscr  Sex Relation Sub. Ins. ID Effective Group Num   1.  [place] -* CHONGDEMARCO KOROMA 1980 Female Self [id number] 1/1/15                                    [address]         Patient Wound Information:     Additional ICD-10 Codes:     Patient Active Problem List   Diagnosis Code    DVT prophylaxis Z29.9    Pulmonary emboli ([address] I26.99    Long term (current) use of anticoagulants Z79.01    Sacral decubitus ulcer, stage IV ([address] L89.154       WOUNDS REQUIRING DRESSING SUPPLIES:     Wound 22 Sacrum #2 (Active)   Wound Image   22 0930   Wound Etiology Pressure Stage 3 22 1006   Wound Cleansed Cleansed with saline 22 1006   Dressing/Treatment Alginate with Ag;Dry dressing 22 1032   Wound Length (cm) 1 cm 22 1006   Wound Width (cm) 1 cm 22 1006   Wound Depth (cm) 0.3 cm 22 1006   Wound Surface Area (cm^2) 1 cm^2 22 1006   Change in Wound Size % (l*w) 15.97 22 1006   Wound Volume (cm^3) 0.3 cm^3 22 1006   Wound Healing % 37 22 1006   Post-Procedure Length (cm) 1 cm 22 1024   Post-Procedure Width (cm) 1 cm 22 1024   Post-Procedure Depth (cm) 0.3 cm 22 1024   Post-Procedure Surface Area (cm^2) 1 cm^2 22 1024   Post-Procedure Volume (cm^3) 0.3 cm^3 11/01/22 1024   Wound Assessment Bleeding 11/01/22 1024   Drainage Amount Moderate 11/01/22 1024   Drainage Description Serosanguinous 11/01/22 1006   Odor None 11/01/22 1006   Merlyn-wound Assessment Intact 11/01/22 1006   Margins Attached edges; Defined edges 11/01/22 1006   Wound Thickness Description not for Pressure Injury Full thickness 10/04/22 1014   Number of days: 182       Wound 05/03/22 Ischium Left #3 (Active)   Wound Image   08/23/22 0930   Wound Etiology Pressure Stage 4 11/01/22 1006   Wound Cleansed Cleansed with saline 11/01/22 1006   Dressing/Treatment Alginate with Ag;Dry dressing 09/13/22 1032   Wound Length (cm) 1 cm 11/01/22 1006   Wound Width (cm) 0.3 cm 11/01/22 1006   Wound Depth (cm) 8.5 cm 11/01/22 1006   Wound Surface Area (cm^2) 0.3 cm^2 11/01/22 1006   Change in Wound Size % (l*w) 99.26 11/01/22 1006   Wound Volume (cm^3) 2.55 cm^3 11/01/22 1006   Wound Healing % 84 11/01/22 1006   Post-Procedure Length (cm) 1 cm 11/01/22 1024   Post-Procedure Width (cm) 2 cm 11/01/22 1024   Post-Procedure Depth (cm) 0.3 cm 11/01/22 1024   Post-Procedure Surface Area (cm^2) 2 cm^2 11/01/22 1024   Post-Procedure Volume (cm^3) 0.6 cm^3 11/01/22 1024   Distance Tunneling (cm) 7 cm 10/18/22 0958   Tunneling Position ___ O'Clock 1500 10/18/22 0958   Undermining Starts ___ O'Clock 1200 11/01/22 1006   Undermining Ends___ O'Clock 1200 11/01/22 1006   Undermining Maxium Distance (cm) 8.5 11/01/22 1006   Wound Assessment Bleeding 11/01/22 1024   Drainage Amount Moderate 11/01/22 1006   Drainage Description Serosanguinous 11/01/22 1006   Odor None 11/01/22 1006   Merlyn-wound Assessment Intact 11/01/22 1006   Margins Defined edges; Unattached edges 11/01/22 1006   Wound Thickness Description not for Pressure Injury Full thickness 10/04/22 1014   Number of days: 182          Supplies Requested :      DISPENSE AS WRITTEN    WOUND #: 3   PRIMARY DRESSING:    None   Cover and Secure with: Conforming roll gauze 3 inch     FREQUENCY OF DRESSING CHANGES:  Daily    Wound Thickness [x] Full   []Partial       Patient Wound(s) Debrided: [x] Yes   [] No    Debridement Date: 11/1/2022    Debribement Type: Excisional/Sharp    ADDITIONAL ITEMS:  [] Gloves Small  [x] Gloves Medium [] Gloves Large [] Gloves XLarge [] Paper Tape 2\" [] Paper Tape 3\"  [] Medipore Tape 3\" [] Medipore Tape 4\"    [] Hypofix skin sensitive tape 2\"  [] Hypofix skin sensitive tape 4\"  [x] Saline  [] Skin Prep   [] Adhesive Remover   [] Cotton Tip Applicators  [] Tubular Stocking   [] Size E  [] Size G  [] Other:    Patient currently being seen by Home Health: [] Yes   [x] No    Quantity of days dispensed:  []15  [x]30  []60  []90 Days    Order valid for 90 days    Provider Information:      PROVIDER'S NAME/NPI  Jacinda Cabral MD NPI: [id number]   I give permission to coordinate the care for this patient

## 2022-11-02 NOTE — PROGRESS NOTES
Margins Attached edges; Defined edges 11/01/22 1006   Wound Thickness Description not for Pressure Injury Full thickness 10/04/22 1014   Number of days: 183       Wound 05/03/22 Ischium Left #3 (Active)   Wound Image   08/23/22 0930   Wound Etiology Pressure Stage 4 11/01/22 1006   Wound Cleansed Cleansed with saline 11/01/22 1006   Dressing/Treatment Moist to dry 11/01/22 1006   Wound Length (cm) 1 cm 11/01/22 1006   Wound Width (cm) 0.3 cm 11/01/22 1006   Wound Depth (cm) 8.5 cm 11/01/22 1006   Wound Surface Area (cm^2) 0.3 cm^2 11/01/22 1006   Change in Wound Size % (l*w) 99.26 11/01/22 1006   Wound Volume (cm^3) 2.55 cm^3 11/01/22 1006   Wound Healing % 84 11/01/22 1006   Post-Procedure Length (cm) 1 cm 11/01/22 1024   Post-Procedure Width (cm) 2 cm 11/01/22 1024   Post-Procedure Depth (cm) 0.3 cm 11/01/22 1024   Post-Procedure Surface Area (cm^2) 2 cm^2 11/01/22 1024   Post-Procedure Volume (cm^3) 0.6 cm^3 11/01/22 1024   Distance Tunneling (cm) 7 cm 10/18/22 0958   Tunneling Position ___ O'Clock 1500 10/18/22 0958   Undermining Starts ___ O'Clock 1200 11/01/22 1006   Undermining Ends___ O'Clock 1200 11/01/22 1006   Undermining Maxium Distance (cm) 8.5 11/01/22 1006   Wound Assessment Bleeding 11/01/22 1024   Drainage Amount Moderate 11/01/22 1006   Drainage Description Serosanguinous 11/01/22 1006   Odor None 11/01/22 1006   Merlyn-wound Assessment Intact 11/01/22 1006   Margins Defined edges; Unattached edges 11/01/22 1006   Wound Thickness Description not for Pressure Injury Full thickness 10/04/22 1014   Number of days: 183           Total Surface Area Debrided:  3 sq cm     Bleeding:  Minimal    Hemostasis Achieved:  by pressure    Procedural Pain:  2  / 10     Post Procedural Pain:  0 / 10     Response to treatment:  Well tolerated by patient. The nature of the patient's condition was explained in depth.  The patient was informed that their compliance to the treatment plan is paramount to successful healing and prevention of further ulceration and/or infection     Treatment Plan:   46-year-old female with MS who is seen in follow-up for a left ischial ulcer and sacral ulcer. The sacral ulcer is clean and becoming smaller. There is some bone at the base. It may need some bony debridement to entirely heal.    The stage IV left ischial ulcer is very small at the surface. It tracks 8 cm posteriorly and about 4 cm anteriorly. In order to pack the ulcer better, it was opened at the surface. We removed an old piece of Surgicel rope which was left in the ulcer. We will change to normal saline wet-to-dry dressing changes. The nurses were instructed to use a Kerlix so that no foreign body will be left in the tract. If this does not offer improvement, we may need to debride the entire surface in order to better expose the base of the wound. If we were to proceed in this fashion, a wound VAC would likely be the best choice for dressing changes. Follow-up again in the wound center in 2 weeks.   Leeanna Camarena MD, M.D.  11/2/2022

## 2022-11-02 NOTE — TELEPHONE ENCOUNTER
Patient called and rescheduled for 11/8. Patient has a wound care appt @ 9:45a. She wanted to come in after that appt however, we cannot schedule her after, due to having no idea how long she will be in wound care. Explained that coming into clinic before her wound care would be best.  Patient agreed to schedule @ 9:30a and will discuss with her aid to see is she can get patient in early for this appt.

## 2022-11-02 NOTE — TELEPHONE ENCOUNTER
Patient called and cancelled her 11/1 appt. She states she has to be in wound care and will check back later today to reschedule.     Patient did call back however, we had no available appts and she said she would call back to r/s

## 2022-11-04 NOTE — DISCHARGE INSTRUCTIONS
[facility], [address] [place] [address] Road  Telephone: [phone]     Discharge Instructions     Important reminders:     **If you have any signs and symptoms of illness (Cough, fever, congestion, nausea, vomiting, diarrhea, etc.) please call the wound care center prior to your appointment. 1. Increase Protein intake for optimal wound healing  2. No added salt to reduce any swelling  3. If diabetic, maintain good glucose control  4. If you smoke, smoking prohibits wound healing, we ask that you refrain from smoking. 5. When taking antibiotics take the entire prescription as ordered. Do not stop taking until medication is all gone unless otherwise instructed. 6. Exercise as tolerated. 7. Keep weight off wounds and reposition every 2 hours if applicable. 8. If wound(s) is on your lower extremity, elevate legs to the level of the heart or above for 30 minutes 4-5 times a day and/or when sitting. Avoid standing for long periods of time. 9. Do not get wounds wet in bath or shower unless otherwise instructed by your physician. If your wound is on your foot or leg, you may purchase a cast bag. Please ask at the pharmacy. If Vascular testing is ordered, please call [address] [place] [address] [phone]) to schedule. Vascular tests ordered by Wound Care Physicians may take up to 2 hours to complete. Please keep that in mind when scheduling. If Vascular testing is scheduled, please bring supplies to replace your dressing after testing is done. The vascular department does not stock supplies. Wound: Sacrum, Left ischium     With each dressing change, rinse wounds with 0.9% Saline. (May use wound wash or soft contact solution. Both can be purchased at a local drug store). If unable to obtain saline, may use a gentle soap and water.      Dressing care: Left ischium- moist to dry with roll gauze (use only one piece to pack all around wound & leave tail to be able to remove) dry dressing- change every 2 days. Sacrum- pack with Aquacel ag rope, 4 x 4's & tape- change every 2 days and as needed for drainage or soilage. Increase protein, continue to drink Myron. Turn & reposition every 1-2 hours and as needed for pressure relief and comfort. Up in chair for meals only. Jessica Morgan with Dr Yuko Estevez office will call to schedule surgery [phone]. Home Care Agency/Facility:     Your wound-care supplies will be provided by: [address] f: [phone] f: [phone] p: [phone] [email or web address]    Please note, depending on your insurance coverage, you may have out-of-pocket expenses for these supplies. Someone from the company should call you to confirm your order and discuss those potential costs before they ship your products -- please anticipate that call. If your out-of-pocket cost could be substantial, Many companies have financial hardship programs for patients who qualify, so please ask about that if you might need a hand. If you have any questions about your supplies or your potential out-of-pocket costs, or if you need to place an order for a refill of supplies (typically monthly), please call the company directly. Your  is Malika [place]ne     Follow up with Dr Esther Putnam In 2 week(s) in the wound care center. Wound Care Center Information: Should you experience any significant changes in your wound(s) or have questions about your wound care, please contact the [facility] at [phone] Monday  - Thursday 8:00 am - 4:00 pm and Friday 8:00 am - 1:00pm. If you need help with your wound outside these hours and cannot wait until we are again available, contact your PCP or go to the hospital emergency room. PLEASE NOTE: IF YOU ARE UNABLE TO OBTAIN WOUND SUPPLIES, CONTINUE TO USE THE SUPPLIES YOU HAVE AVAILABLE UNTIL YOU ARE ABLE TO REACH US.  IT IS MOST IMPORTANT TO KEEP THE WOUND COVERED AT ALL TIMES. Patient Experience     Thank you for trusting us with your care. You may receive a survey from a company called [employer] asking for your feedback. We would appreciate it if you took a few minutes to share your experience.   Your input is very valuable to us

## 2022-11-08 ENCOUNTER — HOSPITAL ENCOUNTER (OUTPATIENT)
Dept: WOUND CARE | Age: 42
Discharge: HOME | End: 2022-11-08
Payer: MEDICAID

## 2022-11-08 ENCOUNTER — TELEPHONE (OUTPATIENT)
Dept: PHARMACY | Age: 42
End: 2022-11-08

## 2022-11-08 ENCOUNTER — ANTI-COAG VISIT (OUTPATIENT)
Dept: PHARMACY | Age: 42
End: 2022-11-08
Payer: MEDICAID

## 2022-11-08 VITALS — TEMPERATURE: 95.7 F | DIASTOLIC BLOOD PRESSURE: 50 MMHG | SYSTOLIC BLOOD PRESSURE: 91 MMHG | HEART RATE: 92 BPM

## 2022-11-08 DIAGNOSIS — L89.154 SACRAL DECUBITUS ULCER, STAGE IV (HCC): Primary | ICD-10-CM

## 2022-11-08 DIAGNOSIS — Z79.01 LONG TERM (CURRENT) USE OF ANTICOAGULANTS: ICD-10-CM

## 2022-11-08 DIAGNOSIS — Z29.9 DVT PROPHYLAXIS: Primary | ICD-10-CM

## 2022-11-08 DIAGNOSIS — I26.09 PULMONARY EMBOLISM WITH ACUTE COR PULMONALE, UNSPECIFIED CHRONICITY, UNSPECIFIED PULMONARY EMBOLISM TYPE (HCC): ICD-10-CM

## 2022-11-08 LAB — INTERNATIONAL NORMALIZATION RATIO, POC: 1.2

## 2022-11-08 PROCEDURE — 99213 OFFICE O/P EST LOW 20 MIN: CPT | Performed by: SURGERY

## 2022-11-08 PROCEDURE — 99214 OFFICE O/P EST MOD 30 MIN: CPT

## 2022-11-08 PROCEDURE — 85610 PROTHROMBIN TIME: CPT

## 2022-11-08 PROCEDURE — 99212 OFFICE O/P EST SF 10 MIN: CPT

## 2022-11-08 RX ORDER — GINSENG 100 MG
CAPSULE ORAL ONCE
OUTPATIENT
Start: 2022-11-08 | End: 2022-11-08

## 2022-11-08 RX ORDER — LIDOCAINE HYDROCHLORIDE 40 MG/ML
SOLUTION TOPICAL ONCE
OUTPATIENT
Start: 2022-11-08 | End: 2022-11-08

## 2022-11-08 RX ORDER — LIDOCAINE HYDROCHLORIDE 20 MG/ML
JELLY TOPICAL ONCE
OUTPATIENT
Start: 2022-11-08 | End: 2022-11-08

## 2022-11-08 RX ORDER — LIDOCAINE 40 MG/G
CREAM TOPICAL ONCE
Status: CANCELLED | OUTPATIENT
Start: 2022-11-08 | End: 2022-11-08

## 2022-11-08 RX ORDER — LIDOCAINE 50 MG/G
OINTMENT TOPICAL ONCE
OUTPATIENT
Start: 2022-11-08 | End: 2022-11-08

## 2022-11-08 RX ORDER — BACITRACIN ZINC AND POLYMYXIN B SULFATE 500; 1000 [USP'U]/G; [USP'U]/G
OINTMENT TOPICAL ONCE
OUTPATIENT
Start: 2022-11-08 | End: 2022-11-08

## 2022-11-08 RX ORDER — BACITRACIN, NEOMYCIN, POLYMYXIN B 400; 3.5; 5 [USP'U]/G; MG/G; [USP'U]/G
OINTMENT TOPICAL ONCE
OUTPATIENT
Start: 2022-11-08 | End: 2022-11-08

## 2022-11-08 RX ORDER — LIDOCAINE 40 MG/G
CREAM TOPICAL ONCE
Status: DISCONTINUED | OUTPATIENT
Start: 2022-11-08 | End: 2022-11-09 | Stop reason: HOSPADM

## 2022-11-08 RX ORDER — CLOBETASOL PROPIONATE 0.5 MG/G
OINTMENT TOPICAL ONCE
OUTPATIENT
Start: 2022-11-08 | End: 2022-11-08

## 2022-11-08 RX ORDER — GENTAMICIN SULFATE 1 MG/G
OINTMENT TOPICAL ONCE
OUTPATIENT
Start: 2022-11-08 | End: 2022-11-08

## 2022-11-08 RX ORDER — BETAMETHASONE DIPROPIONATE 0.05 %
OINTMENT (GRAM) TOPICAL ONCE
OUTPATIENT
Start: 2022-11-08 | End: 2022-11-08

## 2022-11-08 NOTE — TELEPHONE ENCOUNTER
Called patient. States she thinks the procedure will be 11/16 but she doesn't think it will happen because she has a UTI so she thinks she will have to RS. She is calling now to figure out what is going on and then will call us back if she is actually going to have it next week.     Markell Sanchez, PharmD, [facility]

## 2022-11-08 NOTE — TELEPHONE ENCOUNTER
Called Sujata to discuss. She states she will ask the doctor what the plan is since patient thought the procedure would get RS dt UTI. She doesn't believe it will but will double check. Explained INR today. She said they don't have any earlier apts for procedure so 11/16 is soonest they will do it. She will update me tomorrow with the plan so we can provide dosing and lovenox.     Selvin Cifuentes, LukaszD, [facility]

## 2022-11-08 NOTE — TELEPHONE ENCOUNTER
----- Message from [place] sent at 11/8/2022  2:54 PM EST -----  Kerrie lauren/ Dr Ryan Mckeon office called stating pt is having surgery , she believes its on 11/16. MD would like pt to hold warfarin 5 days prior and bridge w/ lovenox (pharmacist to dose) . [phone] if you have any questions.

## 2022-11-08 NOTE — TELEPHONE ENCOUNTER
Received a call from [facility] with [facility] surgery. She states Dr. Ivette Koo has approved patient bridging for her surgical procedure. Dr. Sherryle Greenland wants [address] [place] [address] to bridge patient. Explained to [facility] that patient believes she has a UTI and thinks they will cancel her surgery because of this. [facility] states that the surgeon won't cancel because patient's wound is severe and he wants to do surgery asap. Also let [facility] know that patient's INR is 1.2.

## 2022-11-08 NOTE — PROGRESS NOTES
Jocelyne Lui  Progress Note       Peter Rosales  AGE: 38 y.o. GENDER: female  : 1980  TODAY'S DATE:  2022    Subjective:     Chief Complaint   Patient presents with    Wound Check     Buttock F/U         HISTORY of PRESENT ILLNESS HPI     Peter Rosales is a 38 y.o. female who presents today for wound evaluation. History of Wound: Sacral decubitus ulcer and left ischial ulcer    Wound Pain:  mild  Severity:  3 / 10   Wound Type:  pressure  Modifying Factors:  decreased mobility  Associated Signs/Symptoms:  none        PAST MEDICAL HISTORY        Diagnosis Date    Anemia     DVT (deep venous thrombosis) (HCC)     MRSA (methicillin resistant staph aureus) culture positive 2017    urine    MS (multiple sclerosis) (HCC)     Pulmonary embolism (HCC)     Sacral decubitus ulcer, stage IV ([facility] [address] 2022       PAST SURGICAL HISTORY    Past Surgical History:   Procedure Laterality Date    FINGER AMPUTATION      TUNNELED VENOUS PORT PLACEMENT         FAMILY HISTORY    History reviewed. No pertinent family history.     SOCIAL HISTORY    Social History     Tobacco Use    Smoking status: Never    Smokeless tobacco: Never   Substance Use Topics    Alcohol use: Yes     Comment: socially    Drug use: No       ALLERGIES    Allergies   Allergen Reactions    Ambien [Zolpidem Tartrate]     Ampicillin     Bactrim     Morphine     Risperidone And Related     Vicodin [Hydrocodone-Acetaminophen]     Zoloft        MEDICATIONS    Current Outpatient Medications on File Prior to Encounter   Medication Sig Dispense Refill    alendronate (FOSAMAX) 70 MG tablet Take 70 mg by mouth every 7 days      MYRBETRIQ 50 MG TB24       pantoprazole (PROTONIX) 40 MG tablet Take 1 tablet by mouth daily      montelukast (SINGULAIR) 10 MG tablet Take 10 mg by mouth nightly      magnesium oxide (MAG-OX) 400 MG tablet Take 400 mg by mouth every evening      vitamin D3 (CHOLECALCIFEROL) 125 MCG (5000 UT) TABS tablet Take 1 tablet by mouth daily      simethicone (MYLICON) 80 MG chewable tablet Take 80 mg by mouth daily as needed for Flatulence      warfarin (COUMADIN) 2.5 MG tablet Take 2.5 mg by mouth See Admin Instructions Unsure of dose sequence      solifenacin (VESICARE) 5 MG tablet Take 5 mg by mouth daily      trospium (SANCTURA) 20 MG tablet Take 20 mg by mouth 2 times daily      cetirizine (ZYRTEC) 10 MG tablet Take 10 mg by mouth daily. oxyCODONE-acetaminophen (PERCOCET) 5-325 MG per tablet Take 1 tablet by mouth every 6 hours as needed. baclofen (LIORESAL) 10 MG tablet Take 10 mg by mouth 3 times daily. calcium-vitamin D (OSCAL-500) 500-200 MG-UNIT per tablet Take 1 tablet by mouth daily. ferrous sulfate 325 (65 FE) MG tablet Take 325 mg by mouth daily (with breakfast). famotidine (PEPCID) 20 MG tablet Take 40 mg by mouth daily       potassium chloride SA (K-DUR;KLOR-CON) 20 MEQ tablet Take 20 mEq by mouth daily       therapeutic multivitamin-minerals (THERAGRAN-M) tablet Take 1 tablet by mouth daily. No current facility-administered medications on file prior to encounter.        REVIEW OF SYSTEMS    Constitutional: negative  Eyes: negative  Ears, nose, mouth, throat, and face: negative  Gastrointestinal: negative  Behavioral/Psych: negative      Objective:      BP (!) 91/50   Pulse 92   Temp (!) 95.7 °F (35.4 °C) (Tympanic)     PHYSICAL EXAM    General Appearance: alert and oriented to person, place and time, well-developed and well-nourished, in no acute distress  Head: normocephalic and atraumatic  Neck: neck supple and non tender without mass, no thyromegaly or thyroid nodules, no cervical lymphadenopathy   Abdomen: soft, non-tender, non-distended, normal bowel sounds, no masses or organomegaly      Assessment:     Patient Active Problem List   Diagnosis    DVT prophylaxis    Pulmonary emboli (HCC)    Long term (current) use of anticoagulants    Sacral decubitus ulcer, stage IV ([facility] [address]       Wound 05/03/22 Sacrum #2 (Active)   Wound Image   08/23/22 0930   Wound Etiology Pressure Stage 3 11/08/22 1015   Wound Cleansed Cleansed with saline 11/08/22 1015   Dressing/Treatment Alginate with Ag;Dry dressing 11/01/22 1006   Wound Length (cm) 1.5 cm 11/08/22 1015   Wound Width (cm) 1.4 cm 11/08/22 1015   Wound Depth (cm) 0.8 cm 11/08/22 1015   Wound Surface Area (cm^2) 2.1 cm^2 11/08/22 1015   Change in Wound Size % (l*w) -76.47 11/08/22 1015   Wound Volume (cm^3) 1.68 cm^3 11/08/22 1015   Wound Healing % -253 11/08/22 1015   Post-Procedure Length (cm) 1 cm 11/01/22 1024   Post-Procedure Width (cm) 1 cm 11/01/22 1024   Post-Procedure Depth (cm) 0.3 cm 11/01/22 1024   Post-Procedure Surface Area (cm^2) 1 cm^2 11/01/22 1024   Post-Procedure Volume (cm^3) 0.3 cm^3 11/01/22 1024   Wound Assessment Slough 11/08/22 1015   Drainage Amount Moderate 11/08/22 1015   Drainage Description Serosanguinous 11/08/22 1015   Odor None 11/08/22 1015   Merlyn-wound Assessment Intact 11/08/22 1015   Margins Attached edges 11/08/22 1015   Wound Thickness Description not for Pressure Injury Full thickness 11/08/22 1015   Number of days: 189       Wound 05/03/22 Ischium Left #3 (Active)   Wound Image   08/23/22 0930   Wound Etiology Pressure Stage 4 11/08/22 1015   Wound Cleansed Cleansed with saline 11/08/22 1015   Dressing/Treatment Moist to dry 11/01/22 1006   Wound Length (cm) 1.5 cm 11/08/22 1015   Wound Width (cm) 0.5 cm 11/08/22 1015   Wound Depth (cm) 9 cm 11/08/22 1015   Wound Surface Area (cm^2) 0.75 cm^2 11/08/22 1015   Change in Wound Size % (l*w) 98.14 11/08/22 1015   Wound Volume (cm^3) 6.75 cm^3 11/08/22 1015   Wound Healing % 58 11/08/22 1015   Post-Procedure Length (cm) 1 cm 11/01/22 1024   Post-Procedure Width (cm) 2 cm 11/01/22 1024   Post-Procedure Depth (cm) 0.3 cm 11/01/22 1024   Post-Procedure Surface Area (cm^2) 2 cm^2 11/01/22 1024   Post-Procedure Volume (cm^3) 0.6 cm^3 11/01/22 1024   Distance Tunneling (cm) 7 cm 10/18/22 0958   Tunneling Position ___ O'Clock 1500 10/18/22 0958   Undermining Starts ___ O'Clock 12 11/08/22 1015   Undermining Ends___ O'Clock 1230 11/08/22 1015   Undermining Maxium Distance (cm) 9 11/08/22 1015   Wound Assessment Slough 11/08/22 1015   Drainage Amount Small 11/08/22 1015   Drainage Description Serosanguinous 11/08/22 1015   Odor None 11/08/22 1015   Merlyn-wound Assessment Intact 11/08/22 1015   Margins Defined edges 11/08/22 1015   Wound Thickness Description not for Pressure Injury Full thickness 11/08/22 1015   Number of days: 80     42-year-old female with multiple sclerosis who is seen in follow-up for a stage IV sacral decubitus ulcer and a stage IV left ischial ulcer. The ischial ulcer is very small at the surface but tunnels 7 cm posteriorly and 3 cm anteriorly. She needs a operative debridement of the sacral region in order to remove some soft bone. We will also plan to completely unroof the left ischial ulcer so the base of the ulcer can be better treated. Plan: We will plan for operative debridement of sacral decubitus ulcer and left ischial ulcer. Once the left ischial ulcer has been debrided, we will plan to dress it with a wound VAC.     Treatment Plan          Written Patient Discharge Instructions Given            Electronically signed by Shannon Malik MD on 11/8/2022 at 11:06 AM

## 2022-11-08 NOTE — TELEPHONE ENCOUNTER
Kerrie lauren/ Dr Angelique Ramsay's office called stating pt is having surgery on 11/16, MD wants her to hold warfarin 5 days prior and bridge with Lovenox. Would like pharmacist to dose lovenox. Pool message placed.

## 2022-11-08 NOTE — PROGRESS NOTES
Ms. Ramya Pringle is a 38 y.o. y/o female with history of DVT, PE in about 2010. She presents today for anticoagulation monitoring and adjustment. Pertinent PMH: DVT assoc with PICC line  Hx of multiple sclerosis,     Patient Reported Findings:  Yes     No  []   [x]       Patient verifies current dosing regimen as listed---> does not know dose, but takes what RN gives her but then states that she might have taken 5 mg daily but took old dose. Does not know dose, uses AVS but AVS ran out last week so she is unsure after that --> states that aide followed avs for doses --> unable to verify   []   [x]       S/S bleeding/bruising/swelling/SOB -denies   []   [x]       Blood in urine or stool - denies   []   [x]       Procedures scheduled in the future at this time  ---> none upcoming   []   [x]       Missed Dose -thinks missed at least 2 doses   []   [x]       Extra Dose - denies  [x]   []       Change in medications-using perc and tylenol prn for pain with new braces- states this will continue---> takes tylenol prn ---> cefpodoxil finished several days ago, had several tylenol --> no more tylenol, no other changes in meds -->unsure if patient is still taking cipro, new multivit and will restart biotin soon. ---> started cipro x 7 d on 10/8. Might take a dose of fluconazole---> going to take fluconazole x1 today---> cipro then levo and doxy, states has 1-2 days left   [x]   []       Change in health/diet/appetite Normal vegetable is about twice a week and generally a low vitamin K. When she has broccoli it would be in a stir truong. May have a salad only once a month.---> had vegetables, no significant vit k ---> had greens twice since last visit. States that she had done a detox with tea (haven and turmeric tea)  had greens 2-3 times in the past week, no NVD --> no changes, greens 2-3 times, no NVD --> no vomiting.  No greens d/t being ill. ---> had greens 3x this week --> had greens a few times, no V/D --> tried to have more vit k to help INR ---> states had more greens, diarrhea last week, no NV --> had green beans this weekend. Plans to have greens this week --> no changes--->states more greens --> had salad, greens and cabbage this week --> unsure if she had any vit k---> had greens    []   [x]       Change in alcohol use adamant that she did not drink alcohol while in [place] --> states now that she had one glass of alcohol in [facility]. none recently --> denies---> had a large mixed drink in last week---> denies    []   [x]       Change in activity  []   [x]       Hospital admission- 9/8- 9/10 for stomach pain and got abx. INR was 2.7 on admit then got abx and 5.2 on discharge so she held     9/8- 2.5mg  [x]   []       Emergency department visit- at [facility] 5/13-5/14 for UTI. INR 2. 9. was prescribed cefpodoxime. Was given warfarin 2.5 mg in hospital      5/21- spasms, hypomag   5/24-CP, was told to take 1.5 pills of warfarin and recheck INR dt INR of 1.4    7/5 for UTI. Was prescribed     9/14 for hypotension    10/26- ER, cipro, INR 2.0    []   [x]       Other complaints   Clinical Outcomes:  Yes     No  []   [x]       Major bleeding event  []   [x]       Thromboembolic event    Duration of warfarin therapy: Indefinitely  INR Range:  2.0-3.0     Continues to miss apts and miss doses. Pt rarely offers up any information to determine cause of INR fluctuations. Haroon Siu, RN fills [facility]. Will need to call and provide instructions after apts [phone]. Missed last apt. INR is 1.2 today after missing at least 2 doses, eating greens, and being on abx for 1-2 more days. Take 5mg tonight, 3.75mg tomorrow then continue 3.75mg on Sun, Tues and Fri and 2.5mg all other days. LVM with prasanth with dosing instructions. Encouraged to maintain a consistency of vegetables/salads.    Recheck INR in 1 week, 11/17     **consent form signed 10/27/2021    Referring PCP is Dr. José Manuel Walden results  INR (no units)   Date Value   2022 5.12 (HH)   10/05/2020 8.93 (HH)   2020 3.22 (H)   2020 2.16 (H)     INR,(POC) (no units)   Date Value   2022 1.2   10/18/2022 2.1   10/11/2022 1.7   10/04/2022 1.5     For Pharmacy Admin Tracking Only    Intervention Detail: Adherence Monitorin and Dose Adjustment: 1, reason: Interaction, Therapy Optimization  Total # of Interventions Recommended: 2  Total # of Interventions Accepted: 2  Time Spent (min): 15

## 2022-11-09 NOTE — TELEPHONE ENCOUNTER
Called Chiquita Shaw to discuss procedure 11/16. States cannot talk right now and she will have to call me back this afternoon. Pt is still not sure if she wants to have the procedure 11/16.      Annette Dao, PharmD, [facility]

## 2022-11-09 NOTE — TELEPHONE ENCOUNTER
Gerrykristopher Lawrence called and procedure is going to be 11/16. Doctor wants her off warfarin for 5 days and bridged. Outpatient, can resume post-op. Will need to call patient and dose and start lovenox since INR was already significantly subtherapeutic yesterday.     Lamar Flores, LukaszD, [facility]

## 2022-11-10 NOTE — TELEPHONE ENCOUNTER
Patient called back to discuss, states she is waiting to decide still. Explained that we have heard from both PCP and surgeon and they see no issues with the UTI. Unsure if patient is not wanting the procedure in general or if she is concerned for other reasons. States she doesn't want to discuss dosing yet until she confirms with PCP. Explained that if she doesn't hear back this morning, we will discuss this afternoon and dose patient for procedure. When pt gets home she will check if she has lovenox leftover from last time and let me know. She prefers them to be called into [facility].     Alyssa Donovan, PharmD, [facility]

## 2022-11-10 NOTE — TELEPHONE ENCOUNTER
Called patient to discuss procedure and holding/bridging instructions. LVM.     Kings Radford, PharmD, [facility]

## 2022-11-14 ENCOUNTER — TELEPHONE (OUTPATIENT)
Dept: SURGERY | Age: 42
End: 2022-11-14

## 2022-11-14 ENCOUNTER — TELEPHONE (OUTPATIENT)
Dept: PHARMACY | Age: 42
End: 2022-11-14

## 2022-11-14 NOTE — TELEPHONE ENCOUNTER
Jacky Mouton called and they will do 12/6. Will provide patient with holding instructions, lovenox rx, and bridging instructions at apt 11/17. Called patient to discuss and reminded of apt 11/17.     Natasha Park, PharmD, [facility]

## 2022-11-14 NOTE — TELEPHONE ENCOUNTER
PATIENT SCHEDULED FOR SURGERY ON 11-16-22. STATES SHE TAKES BLOOD THINNERS WAS NOT INSTRUCTED TO STOP THEM.  PLEASE ADVISE. [phone]

## 2022-11-14 NOTE — TELEPHONE ENCOUNTER
Called patient to discuss, she states that she is still waiting to decide on the procedure. She thinks it will be rescheduled. Called Sujata to explain situation and ask if she has been able to convince patient of procedure or if it will be RS. Left message for her.      Markell Sanchez, PharmD, [facility]

## 2022-11-14 NOTE — TELEPHONE ENCOUNTER
PT cannot schedule surgery on 11/21 due to no transportation. PT has scheduled for 12/6. PT is aware to stop coumadin 5 days prior to surgery and Lovenox bridging instruction will be given to patient during next coumadin clinic appt. Michael from the [address] [place] [address] will call the pt with appt date and instructions.

## 2022-11-14 NOTE — TELEPHONE ENCOUNTER
last week I spoke with the coumadin clinic who stated they would give PT Lovenox instructions . PT was informed by me that she would have to stop Coumadin 5 days prior to surgery and bridge with Lovenox and the CC would call. PT states they called too late and she has still been taking Coumadin and not bridged with Lovenox. I will call the coumadin clinic later today , surgery will most likely have to be rescheduled.

## 2022-11-14 NOTE — TELEPHONE ENCOUNTER
Adiel Kelley is calling in regards to the patients procedure on 11/16. Patient told Adiel Kelley the procedure was not happening do to a UTI. Adiel Kelley states that she called her other doctor who said that they could work around the UTI. Adiel Kelley states the patient has not been holding her coumadin or been bridged. Adiel Kelley states that the patient has not been answering her calls and thinks the procedure is going to be cancelled.      Please call Adiel Kelley from the coumadin clinic [phone]

## 2022-11-14 NOTE — TELEPHONE ENCOUNTER
Patricia Head called and said they are RS for Monday 11/21 if that works for Pamela Lopes. She will call to discuss now. States still hold 5 days and bridge. Patient will be instructed to stop warfarin 11/16 and will RTC 11/17 for bridging instructions and lovenox. Patricia Head will call to confirm that Pamela Lopes is agreeable to 11/21.      Sharyle Banco, LukaszD, [facility]

## 2022-11-23 NOTE — DISCHARGE INSTRUCTIONS
[address], [address]  Telephone: [phone]     Discharge Instructions     Important reminders:     **If you have any signs and symptoms of illness (Cough, fever, congestion, nausea, vomiting, diarrhea, etc.) please call the wound care center prior to your appointment. 1. Increase Protein intake for optimal wound healing  2. No added salt to reduce any swelling  3. If diabetic, maintain good glucose control  4. If you smoke, smoking prohibits wound healing, we ask that you refrain from smoking. 5. When taking antibiotics take the entire prescription as ordered. Do not stop taking until medication is all gone unless otherwise instructed. 6. Exercise as tolerated. 7. Keep weight off wounds and reposition every 2 hours if applicable. 8. If wound(s) is on your lower extremity, elevate legs to the level of the heart or above for 30 minutes 4-5 times a day and/or when sitting. Avoid standing for long periods of time. 9. Do not get wounds wet in bath or shower unless otherwise instructed by your physician. If your wound is on your foot or leg, you may purchase a cast bag. Please ask at the pharmacy. If Vascular testing is ordered, please call [address] [place] [address] [phone]) to schedule. Vascular tests ordered by Wound Care Physicians may take up to 2 hours to complete. Please keep that in mind when scheduling. If Vascular testing is scheduled, please bring supplies to replace your dressing after testing is done. The vascular department does not stock supplies. Wound: Sacrum, Left ischium     With each dressing change, rinse wounds with 0.9% Saline. (May use wound wash or soft contact solution. Both can be purchased at a local drug store). If unable to obtain saline, may use a gentle soap and water.      Dressing care: Left ischium- moist to dry with roll gauze (use only one piece to pack all around wound & leave tail to be able to remove) dry dressing- change every 2 days. Sacrum- pack with Aquacel ag rope, 4 x 4's & tape- change every 2 days and as needed for drainage or soilage. Increase protein, continue to drink Myron. Turn & reposition every 1-2 hours and as needed for pressure relief and comfort. Up in chair for meals only. Dillon Hopper with Dr Xu Ramirez office for surgery questions [phone]. Home Care Agency/Facility:     Your wound-care supplies will be provided by: [address] f: [phone] f: [phone] p: [phone] [email or web address]    Please note, depending on your insurance coverage, you may have out-of-pocket expenses for these supplies. Someone from the company should call you to confirm your order and discuss those potential costs before they ship your products -- please anticipate that call. If your out-of-pocket cost could be substantial, Many companies have financial hardship programs for patients who qualify, so please ask about that if you might need a hand. If you have any questions about your supplies or your potential out-of-pocket costs, or if you need to place an order for a refill of supplies (typically monthly), please call the company directly. Your  is Avni Zavala     Follow up with Dr Jhon Bravo In 2 week(s) in the wound care center. Wound Care Center Information: Should you experience any significant changes in your wound(s) or have questions about your wound care, please [facility]  at [phone] Monday  - Thursday 8:00 am - 4:00 pm and Friday 8:00 am - 1:00pm. If you need help with your wound outside these hours and cannot wait until we are again available, contact your PCP or go to the hospital emergency room. PLEASE NOTE: IF YOU ARE UNABLE TO OBTAIN WOUND SUPPLIES, CONTINUE TO USE THE SUPPLIES YOU HAVE AVAILABLE UNTIL YOU ARE ABLE TO REACH US.  IT IS MOST IMPORTANT TO KEEP THE WOUND COVERED AT ALL TIMES. Patient Experience     Thank you for trusting us with your care. You may receive a survey from a company called [employer] asking for your feedback. We would appreciate it if you took a few minutes to share your experience.   Your input is very valuable to us

## 2022-11-29 ENCOUNTER — HOSPITAL ENCOUNTER (OUTPATIENT)
Dept: WOUND CARE | Age: 42
Discharge: HOME | End: 2022-11-29
Payer: MEDICAID

## 2022-11-29 ENCOUNTER — ANTI-COAG VISIT (OUTPATIENT)
Dept: PHARMACY | Age: 42
End: 2022-11-29
Payer: MEDICAID

## 2022-11-29 VITALS
WEIGHT: 96.8 LBS | HEART RATE: 102 BPM | DIASTOLIC BLOOD PRESSURE: 69 MMHG | BODY MASS INDEX: 17.7 KG/M2 | TEMPERATURE: 97.1 F | SYSTOLIC BLOOD PRESSURE: 96 MMHG

## 2022-11-29 DIAGNOSIS — I26.09 OTHER ACUTE PULMONARY EMBOLISM WITH ACUTE COR PULMONALE (HCC): ICD-10-CM

## 2022-11-29 DIAGNOSIS — Z79.01 LONG TERM (CURRENT) USE OF ANTICOAGULANTS: ICD-10-CM

## 2022-11-29 DIAGNOSIS — Z29.9 DVT PROPHYLAXIS: Primary | ICD-10-CM

## 2022-11-29 DIAGNOSIS — L89.154 SACRAL DECUBITUS ULCER, STAGE IV (HCC): Primary | ICD-10-CM

## 2022-11-29 LAB — INTERNATIONAL NORMALIZATION RATIO, POC: 1.1

## 2022-11-29 PROCEDURE — 99214 OFFICE O/P EST MOD 30 MIN: CPT

## 2022-11-29 PROCEDURE — 99214 OFFICE O/P EST MOD 30 MIN: CPT | Performed by: SURGERY

## 2022-11-29 PROCEDURE — 99212 OFFICE O/P EST SF 10 MIN: CPT

## 2022-11-29 PROCEDURE — 85610 PROTHROMBIN TIME: CPT

## 2022-11-29 RX ORDER — LIDOCAINE HYDROCHLORIDE 20 MG/ML
JELLY TOPICAL ONCE
OUTPATIENT
Start: 2022-11-29 | End: 2022-11-29

## 2022-11-29 RX ORDER — LIDOCAINE 50 MG/G
OINTMENT TOPICAL ONCE
OUTPATIENT
Start: 2022-11-29 | End: 2022-11-29

## 2022-11-29 RX ORDER — LIDOCAINE 40 MG/G
CREAM TOPICAL ONCE
Status: DISCONTINUED | OUTPATIENT
Start: 2022-11-29 | End: 2022-11-30 | Stop reason: HOSPADM

## 2022-11-29 RX ORDER — BACITRACIN, NEOMYCIN, POLYMYXIN B 400; 3.5; 5 [USP'U]/G; MG/G; [USP'U]/G
OINTMENT TOPICAL ONCE
OUTPATIENT
Start: 2022-11-29 | End: 2022-11-29

## 2022-11-29 RX ORDER — LIDOCAINE HYDROCHLORIDE 40 MG/ML
SOLUTION TOPICAL ONCE
OUTPATIENT
Start: 2022-11-29 | End: 2022-11-29

## 2022-11-29 RX ORDER — CLOBETASOL PROPIONATE 0.5 MG/G
OINTMENT TOPICAL ONCE
OUTPATIENT
Start: 2022-11-29 | End: 2022-11-29

## 2022-11-29 RX ORDER — LIDOCAINE 40 MG/G
CREAM TOPICAL ONCE
OUTPATIENT
Start: 2022-11-29 | End: 2022-11-29

## 2022-11-29 RX ORDER — GINSENG 100 MG
CAPSULE ORAL ONCE
OUTPATIENT
Start: 2022-11-29 | End: 2022-11-29

## 2022-11-29 RX ORDER — GENTAMICIN SULFATE 1 MG/G
OINTMENT TOPICAL ONCE
OUTPATIENT
Start: 2022-11-29 | End: 2022-11-29

## 2022-11-29 RX ORDER — BETAMETHASONE DIPROPIONATE 0.05 %
OINTMENT (GRAM) TOPICAL ONCE
OUTPATIENT
Start: 2022-11-29 | End: 2022-11-29

## 2022-11-29 RX ORDER — BACITRACIN ZINC AND POLYMYXIN B SULFATE 500; 1000 [USP'U]/G; [USP'U]/G
OINTMENT TOPICAL ONCE
OUTPATIENT
Start: 2022-11-29 | End: 2022-11-29

## 2022-11-29 NOTE — PROGRESS NOTES
Jocelyne Lui  Progress Note       Ravindra Green  AGE: 38 y.o. GENDER: female  : 1980  TODAY'S DATE:  2022    Subjective:     Chief Complaint   Patient presents with    Wound Check     Left ischium and sacral area           HISTORY of PRESENT ILLNESS HPI     Ravindra Green is a 38 y.o. female who presents today for wound evaluation. History of Wound: Pressure ulcers    Wound Pain:  mild  Severity:  2 / 10   Wound Type:  pressure  Modifying Factors:  decreased mobility  Associated Signs/Symptoms:  none        PAST MEDICAL HISTORY        Diagnosis Date    Anemia     DVT (deep venous thrombosis) (HCC)     MRSA (methicillin resistant staph aureus) culture positive 2017    urine    MS (multiple sclerosis) (HCC)     Pulmonary embolism (HCC)     Sacral decubitus ulcer, stage IV ([facility] [address] 2022       PAST SURGICAL HISTORY    Past Surgical History:   Procedure Laterality Date    FINGER AMPUTATION      TUNNELED VENOUS PORT PLACEMENT         FAMILY HISTORY    History reviewed. No pertinent family history.     SOCIAL HISTORY    Social History     Tobacco Use    Smoking status: Never    Smokeless tobacco: Never   Substance Use Topics    Alcohol use: Yes     Comment: socially    Drug use: No       ALLERGIES    Allergies   Allergen Reactions    Ambien [Zolpidem Tartrate]     Ampicillin     Bactrim     Morphine     Risperidone And Related     Vicodin [Hydrocodone-Acetaminophen]     Zoloft        MEDICATIONS    Current Outpatient Medications on File Prior to Encounter   Medication Sig Dispense Refill    alendronate (FOSAMAX) 70 MG tablet Take 70 mg by mouth every 7 days      MYRBETRIQ 50 MG TB24       pantoprazole (PROTONIX) 40 MG tablet Take 1 tablet by mouth daily      montelukast (SINGULAIR) 10 MG tablet Take 10 mg by mouth nightly      magnesium oxide (MAG-OX) 400 MG tablet Take 400 mg by mouth every evening      vitamin D3 (CHOLECALCIFEROL) 125 MCG (5000 UT) TABS tablet Take 1 tablet by mouth daily      simethicone (MYLICON) 80 MG chewable tablet Take 80 mg by mouth daily as needed for Flatulence      warfarin (COUMADIN) 2.5 MG tablet Take 2.5 mg by mouth See Admin Instructions Unsure of dose sequence      solifenacin (VESICARE) 5 MG tablet Take 5 mg by mouth daily      trospium (SANCTURA) 20 MG tablet Take 20 mg by mouth 2 times daily      cetirizine (ZYRTEC) 10 MG tablet Take 10 mg by mouth daily. oxyCODONE-acetaminophen (PERCOCET) 5-325 MG per tablet Take 1 tablet by mouth every 6 hours as needed. baclofen (LIORESAL) 10 MG tablet Take 10 mg by mouth 3 times daily. calcium-vitamin D (OSCAL-500) 500-200 MG-UNIT per tablet Take 1 tablet by mouth daily. ferrous sulfate 325 (65 FE) MG tablet Take 325 mg by mouth daily (with breakfast). famotidine (PEPCID) 20 MG tablet Take 40 mg by mouth daily       potassium chloride SA (K-DUR;KLOR-CON) 20 MEQ tablet Take 20 mEq by mouth daily       therapeutic multivitamin-minerals (THERAGRAN-M) tablet Take 1 tablet by mouth daily. No current facility-administered medications on file prior to encounter.        REVIEW OF SYSTEMS    Constitutional: negative  Eyes: negative  Ears, nose, mouth, throat, and face: negative  Gastrointestinal: negative  Behavioral/Psych: negative      Objective:      BP 96/69   Pulse (!) 102   Temp 97.1 °F (36.2 °C) (Infrared)   Wt 96 lb 12.8 oz (43.9 kg)   BMI 17.70 kg/m²     PHYSICAL EXAM    General Appearance: alert and oriented to person, place and time, well-developed and well-nourished, in no acute distress  Head: normocephalic and atraumatic  Neck: neck supple and non tender without mass, no thyromegaly or thyroid nodules, no cervical lymphadenopathy   Abdomen: soft, non-tender, non-distended, normal bowel sounds, no masses or organomegaly      Assessment:     Patient Active Problem List   Diagnosis    DVT prophylaxis    Pulmonary emboli ([facility] [address]    Long term (current) use of anticoagulants    Sacral decubitus ulcer, stage IV ([address]       Wound 05/03/22 Sacrum #2 (Active)   Wound Image   08/23/22 0930   Wound Etiology Pressure Stage 3 11/29/22 0950   Wound Cleansed Cleansed with saline 11/29/22 0950   Dressing/Treatment Alginate with Ag;Dry dressing 11/08/22 1156   Wound Length (cm) 1.4 cm 11/29/22 0950   Wound Width (cm) 1.1 cm 11/29/22 0950   Wound Depth (cm) 1 cm 11/29/22 0950   Wound Surface Area (cm^2) 1.54 cm^2 11/29/22 0950   Change in Wound Size % (l*w) -29.41 11/29/22 0950   Wound Volume (cm^3) 1.54 cm^3 11/29/22 0950   Wound Healing % -224 11/29/22 0950   Post-Procedure Length (cm) 1 cm 11/01/22 1024   Post-Procedure Width (cm) 1 cm 11/01/22 1024   Post-Procedure Depth (cm) 0.3 cm 11/01/22 1024   Post-Procedure Surface Area (cm^2) 1 cm^2 11/01/22 1024   Post-Procedure Volume (cm^3) 0.3 cm^3 11/01/22 1024   Wound Assessment Exposed structure bone;Granulation tissue;Slough 11/29/22 0950   Drainage Amount Moderate 11/29/22 0950   Drainage Description Serosanguinous 11/29/22 0950   Odor None 11/29/22 0950   Merlyn-wound Assessment Intact 11/29/22 0950   Margins Attached edges 11/29/22 0950   Wound Thickness Description not for Pressure Injury Full thickness 11/29/22 0950   Number of days: 210       Wound 05/03/22 Ischium Left #3 (Active)   Wound Image   08/23/22 0930   Wound Etiology Pressure Stage 4 11/29/22 0950   Wound Cleansed Cleansed with saline 11/29/22 0950   Dressing/Treatment Moist to dry 11/08/22 1156   Wound Length (cm) 1.4 cm 11/29/22 0950   Wound Width (cm) 1 cm 11/29/22 0950   Wound Depth (cm) 8 cm 11/29/22 0950   Wound Surface Area (cm^2) 1.4 cm^2 11/29/22 0950   Change in Wound Size % (l*w) 96.53 11/29/22 0950   Wound Volume (cm^3) 11.2 cm^3 11/29/22 0950   Wound Healing % 31 11/29/22 0950   Post-Procedure Length (cm) 1 cm 11/01/22 1024   Post-Procedure Width (cm) 2 cm 11/01/22 1024   Post-Procedure Depth (cm) 0.3 cm 11/01/22 1024   Post-Procedure Surface Area (cm^2) 2 cm^2 11/01/22 1024   Post-Procedure Volume (cm^3) 0.6 cm^3 11/01/22 1024   Distance Tunneling (cm) 7 cm 10/18/22 0958   Tunneling Position ___ O'Clock 1500 10/18/22 0958   Undermining Starts ___ O'Clock 1200 11/29/22 0950   Undermining Ends___ O'Clock 1230 11/29/22 0950   Undermining Maxium Distance (cm) 8 11/29/22 0950   Wound Assessment Slough 11/29/22 0950   Drainage Amount Moderate 11/29/22 0950   Drainage Description Purulent;Yellow;Brown 11/29/22 0950   Odor None 11/29/22 0950   Merlyn-wound Assessment Maceration 11/29/22 0950   Margins Defined edges 11/29/22 0950   Wound Thickness Description not for Pressure Injury Full thickness 11/29/22 0950   Number of days: 24     53-year-old female with MS who is seen in follow-up for a stage IV pressure ulcerations of the sacral region and a stage IV pressure ulceration of the left ischial region. The ischial ulcer has as long tracts in 2 directions. We are scheduled for surgical debridement on 12/6/2022. The patient has multiple questions about surgery. All of them were answered. The concern is that she will not move ahead with surgery. I have strongly encouraged her that this is the proper direction. Plan:     Continue current wound care. Surgical debridement.   Treatment Plan          Written Patient Discharge Instructions Given            Electronically signed by Gabriela Rodriguez MD on 11/29/2022 at 10:09 AM

## 2022-11-29 NOTE — TELEPHONE ENCOUNTER
Lovenox prescription phoned into [facility] [address] [phone] under Dr. Meng John ([phone])  Lovenox 40 mg syringes  Inject 40 mg sq BID  30 syringes  2 refills

## 2022-11-29 NOTE — PROGRESS NOTES
Ms. Trent Rivers is a 38 y.o. y/o female with history of DVT, PE in about 2010. She presents today for anticoagulation monitoring and adjustment. Pertinent PMH: DVT assoc with PICC line  Hx of multiple sclerosis,     Patient Reported Findings:  Yes     No  []   [x]       Patient verifies current dosing regimen as listed---> does not know dose, but takes what RN gives her but then states that she might have taken 5 mg daily but took old dose. Does not know dose, uses AVS but AVS ran out last week so she is unsure after that --> states that aide followed avs for doses --> unable to verify --> unable to state dose  []   [x]       S/S bleeding/bruising/swelling/SOB -denies   []   [x]       Blood in urine or stool - denies   [x]   []       Procedures scheduled in the future at this time  ---> having wound procedure on 12/6, needs to hold warfarin for 5 days and ridge with lovenox. Also having botox inj, unsure if needs to hold warfarin (last year when had botox inj held 5 days and bridged with lovenox). Pt states she has an appt today to discuss procedure since having wound procedure week prior   [x]   []       Missed Dose -thinks missed at least 2 doses --> thinks missed one dose within last week but unable to state what day. []   [x]       Extra Dose - denies  [x]   []       Change in medications-using perc and tylenol prn for pain with new braces- states this will continue---> takes tylenol prn ---> cefpodoxil finished several days ago, had several tylenol --> no more tylenol, no other changes in meds -->unsure if patient is still taking cipro, new multivit and will restart biotin soon. ---> started cipro x 7 d on 10/8. Might take a dose of fluconazole---> going to take fluconazole x1 today---> cipro then levo and doxy, states has 1-2 days left --> on macrobid recently  [x]   []       Change in health/diet/appetite Normal vegetable is about twice a week and generally a low vitamin K.  When she has broccoli it would be in a stir truong. May have a salad only once a month.---> had vegetables, no significant vit k ---> had greens twice since last visit. States that she had done a detox with tea (haven and turmeric tea)  had greens 2-3 times in the past week, no NVD --> no changes, greens 2-3 times, no NVD --> no vomiting. No greens d/t being ill. ---> had greens 3x this week --> had greens a few times, no V/D --> tried to have more vit k to help INR ---> states had more greens, diarrhea last week, no NV --> had green beans this weekend. Plans to have greens this week --> no changes--->states more greens --> had salad, greens and cabbage this week --> unsure if she had any vit k---> had vit k multiple times in the past week   []   [x]       Change in alcohol use adamant that she did not drink alcohol while in [place] --> states now that she had one glass of alcohol in [facility]. none recently --> denies---> had a large mixed drink in last week---> denies    []   [x]       Change in activity  []   [x]       Hospital admission- 9/8- 9/10 for stomach pain and got abx. INR was 2.7 on admit then got abx and 5.2 on discharge so she held     9/8- 2.5mg  [x]   []       Emergency department visit- at [facility] 5/13-5/14 for UTI. INR 2. 9. was prescribed cefpodoxime. Was given warfarin 2.5 mg in hospital      5/21- spasms, hypomag   5/24-CP, was told to take 1.5 pills of warfarin and recheck INR dt INR of 1.4    7/5 for UTI. Was prescribed     9/14 for hypotension    10/26- ER, cipro, INR 2.0      11/21- for back pain. INR 1, was given 10 mg of warfarin that day prior to leaving. Was prescribed macrobid x 7 d.   []   [x]       Other complaints   Clinical Outcomes:  Yes     No  []   [x]       Major bleeding event  []   [x]       Thromboembolic event    Duration of warfarin therapy: Indefinitely  INR Range:  2.0-3.0     Continues to miss apts and miss doses. Pt rarely offers up any information to determine cause of INR fluctuations. Danny Thomason, RN fills [facility]. Will need to call and provide instructions after apts [phone]. Missed last several appts. INR is 1.1 today after missing at least one dose, eating greens. Received boosted dose of 10 mg last week in ED  Take 5mg tonight and tomorrow then increase weekly dose to 2.5 mg Mon and Thurs and 3.75 mg all other days. (11% inc)  Begin holding warfarin on 12/1 until procedure on 12/6. Resume warfarin taking 5 mg for 2 days then return to weekly dose above. See bridging schedule below   Pt states that Danny Thomason is on vacation and another RN is doing meds for pt, does not have phone number for clinic to contact that RN. Reiterated need for RN to receive dosing paperwork since very different. Pt states that RN is coming to house today    Reiterated need for pt or RN to call clinic if going to have botox inj on 12/14 and if needs to hold/bridge with lovenox. Lovenox script called in to [facility] pharmacy   Encouraged to maintain a consistency of vegetables/salads. Recheck INR in 2 weeks, 12/13, after procedure.  Will need to r/s if having procedure on 12/14 and need to hold warfarin     **consent form signed 10/27/2021    Referring PCP is Dr. Patito Leonard results  INR (no units)   Date Value   06/17/2022 5.12 (HH)   10/05/2020 8.93 (HH)   09/19/2020 3.22 (H)   07/17/2020 2.16 (H)     INR,(POC) (no units)   Date Value   11/08/2022 1.2   10/18/2022 2.1   10/11/2022 1.7   10/04/2022 1.5     Day  Date Warfarin Dose  Lovenox Dose    5 days before 12/1/2022 Stop warfarin  8 AM: 40 mg injection  8 PM: 40 mg injection   4 days before 12/2/2022 No warfarin  8 AM: 40 mg injection  8 PM: 40 mg injection   3 days before 12/3/2022 No warfarin  8 AM: 40 mg injection  8 PM: 40 mg injection   2 days before 12/4/2022 No warfarin  8 AM: 40 mg injection  8 PM: 40 mg injection   1 day before  12/5/2022 No Warfarin    8 AM: 40 mg injection  8 PM: NO LOVENOX   Day of procedure 12/6/2022 Take  5 mg of warfarin in the evening 8 AM: NO LOVENOX  8 PM: 40 mg injection   1 day after  2022 Take  5 mg of warfarin in the evening 8 AM: 40 mg injection  8 PM: 40 mg injection   2 days after  2022 Take 2.5 mg of warfarin in the evening 8 AM: 40 mg injection  8 PM: 40 mg injection   3 days after  2022 Take 3.75 mg of warfarin in the evening 8 AM: 40 mg injection  8 PM: 40 mg injection   3 days after  12/10/2022 Take 3.75 mg of warfarin in the evening 8 AM: 40 mg injection  8 PM: 40 mg injection   3 days after  2022 Take 3.75 mg of warfarin in the evening 8 AM: 40 mg injection  8 PM: 40 mg injection   3 days after  2022 Take 2.5 mg of warfarin in the evening 8 AM: 40 mg injection  8 PM: 40 mg injection   3 days after  2022 Take 3.75 mg of warfarin in the evening 8 AM: 40 mg injection  8 PM: 40 mg injection       For Pharmacy Admin Tracking Only    Intervention Detail: Adherence Monitorin, Dose Adjustment: 1, reason: Therapy Optimization, and Refill(s) Provided  Total # of Interventions Recommended: 3  Total # of Interventions Accepted: 3  Time Spent (min): 30

## 2022-11-29 NOTE — PLAN OF CARE
Discharge instructions given. Patient verbalized understanding. Return to [address] [place] [address],[address] in 2 week(s).   Surgery scheduled for 12/6 for debridement

## 2022-11-30 ENCOUNTER — TELEPHONE (OUTPATIENT)
Dept: SURGERY | Age: 42
End: 2022-11-30

## 2022-11-30 NOTE — TELEPHONE ENCOUNTER
Spoke to PT, answered a few questions about what the wound debridement was, how long it takes, explained why she needed anesthesia and that the surgery was outpatient. PT had more specific questions regarding wound and surgery , which I have forwarded to Srinivasa Brice in the wound clinic.

## 2022-12-02 RX ORDER — OXYBUTYNIN CHLORIDE 5 MG/1
5 TABLET ORAL 3 TIMES DAILY
COMMUNITY

## 2022-12-02 RX ORDER — NITROFURANTOIN 25; 75 MG/1; MG/1
100 CAPSULE ORAL 2 TIMES DAILY
COMMUNITY

## 2022-12-02 NOTE — PROGRESS NOTES
Name_______________________________________Printed:____________________  Date and time of surgery____12/6/2022   1230____________________Arrival Time:___1100   MAIN_____________   1. The instructions given regarding when and if a patient needs to stop oral intake prior to surgery varies. Follow the specific instructions you were given                  __X_Nothing to eat or to drink after Midnight the night before.                   ____Carbo loading or ERAS instructions will be given to select patients-if you have been given those instructions -please do the following                           The evening before your surgery after dinner before midnight drink 40 ounces of gatorade. If you are diabetic use sugar free. The morning of surgery drink 40 ounces of water. This needs to be finished 3 hours prior to your surgery start time. 2. Take the following pills with a small sip of water on the morning of surgery___________________________________________________                  Do not take blood pressure medications ending in pril or sartan the rex prior to surgery or the morning of surgery. Dr Aaron Ledesma patient are not to take any medications the AM of surgery. 3. Aspirin, Ibuprofen, Advil, Naproxen, Vitamin E and other Anti-inflammatory products and supplements should be stopped for 5 -7days before surgery or as directed by your physician. 4. X Check with your Doctor regarding stopping Plavix, Coumadin,Eliquis, Lovenox,Effient,Pradaxa,Xarelto, Fragmin or other blood thinners and follow their instructions. 5. Do not smoke, and do not drink any alcoholic beverages 24 hours prior to surgery. This includes NA Beer. Refrain from the usage of any recreational drugs. 6. You may brush your teeth and gargle the morning of surgery. DO NOT SWALLOW WATER   7. You MUST make arrangements for a responsible adult to stay on site while you are here and take you home after your surgery.  You will not be allowed to leave alone or drive yourself home. It is strongly suggested someone stay with you the first 24 hrs. Your surgery will be cancelled if you do not have a ride home. 8. A parent/legal guardian must accompany a child scheduled for surgery and plan to stay at the hospital until the child is discharged. Please do not bring other children with you. 9. Please wear simple, loose fitting clothing to the hospital.  Earnestine Yoo not bring valuables (money, credit cards, checkbooks, etc.) Do not wear any makeup (including no eye makeup) or nail polish on your fingers or toes. 10. DO NOT wear any jewelry or piercings on day of surgery. All body piercing jewelry must be removed. 11. If you have ___dentures, they will be removed before going to the OR; we will provide you a container. If you wear ___contact lenses or ___glasses, they will be removed; please bring a case for them. 12. Please see your family doctor/pediatrician for a history & physical and/or concerning medications. Bring any test results/reports from your physician's office. PCP__________________Phone___________H&P Appt. Date________             13 If you  have a Living Will and Durable Power of  for Healthcare, please bring in a copy. 15. Notify your Surgeon if you develop any illness between now and surgery  time, cough, cold, fever, sore throat, nausea, vomiting, etc.  Please notify your surgeon if you experience dizziness, shortness of breath or blurred vision between now & the time of your surgery             15. DO NOT shave your operative site 96 hours prior to surgery. For face & neck surgery, men may use an electric razor 48 hours prior to surgery. 16. Shower the night before or morning of surgery using an antibacterial soap or as you have been instructed. 17. To provide excellent care visitors will be limited to one in the room at any given time.              18.  Please bring picture ID and insurance card. 19.  Visit our web site for additional information:  [email or web address]              20.During flu season no children under the age of 15 are permitted in the hospital for the safety of all patients. 21. If you take a long acting insulin in the evening only  take half of your usual  dose the night  before your procedure              22. If you use a c-pap please bring DOS if staying overnight,             23.For your convenience [facility] has a pharmacy on site to fill your prescriptions. 24. If you use oxygen and have a portable tank please bring it  with you the DOS             25. Bring a complete list of all your medications with name and dose include any supplements. 26. Other__________________________________________   *Please call pre admission testing if you any further questions   [facility]   [address] [place] [address]. [place]  [phone]   [address] [place] [address]       VISITOR POLICY(subject to change)    Current policy is 2 visitors per patient. No children. Mask is  at the discretion of the facility. Visiting hours are 8a-8p. Overnight visitors will be at the discretion of the nurse. All policies subject to change. All above information reviewed with patient in person or by phone. Patient verbalizes understanding. All questions and concerns addressed.                                                                                                  Patient/Rep____PATIENT________________                                                                                                                                    PRE OP INSTRUCTIONS

## 2022-12-05 ENCOUNTER — TELEPHONE (OUTPATIENT)
Dept: PHARMACY | Age: 42
End: 2022-12-05

## 2022-12-05 NOTE — TELEPHONE ENCOUNTER
Pt called clinic to notify that procedure on 12/14 has been postponed. Will plan to do in January some time. Will maintain appt to RTC 12/13.  Advised pt to use bridging instructions provided

## 2022-12-06 ENCOUNTER — ANESTHESIA (OUTPATIENT)
Dept: OPERATING ROOM | Age: 42
End: 2022-12-06
Payer: MEDICAID

## 2022-12-06 ENCOUNTER — ANESTHESIA EVENT (OUTPATIENT)
Dept: OPERATING ROOM | Age: 42
End: 2022-12-06
Payer: MEDICAID

## 2022-12-06 ENCOUNTER — HOSPITAL ENCOUNTER (OUTPATIENT)
Age: 42
Setting detail: OUTPATIENT SURGERY
Discharge: HOME | End: 2022-12-06
Attending: SURGERY | Admitting: SURGERY
Payer: MEDICAID

## 2022-12-06 VITALS
BODY MASS INDEX: 18.4 KG/M2 | OXYGEN SATURATION: 100 % | DIASTOLIC BLOOD PRESSURE: 99 MMHG | HEIGHT: 62 IN | RESPIRATION RATE: 16 BRPM | HEART RATE: 110 BPM | TEMPERATURE: 96.9 F | SYSTOLIC BLOOD PRESSURE: 126 MMHG | WEIGHT: 100 LBS

## 2022-12-06 PROBLEM — L89.324 LEFT ISCHIAL PRESSURE SORE, STAGE IV (HCC): Status: ACTIVE | Noted: 2022-12-06

## 2022-12-06 LAB
ANION GAP SERPL CALCULATED.3IONS-SCNC: 10 MMOL/L (ref 3–16)
APTT: 31.4 SEC (ref 23–34.3)
BUN BLDV-MCNC: 14 MG/DL (ref 7–20)
CALCIUM SERPL-MCNC: 8.7 MG/DL (ref 8.3–10.6)
CHLORIDE BLD-SCNC: 104 MMOL/L (ref 99–110)
CO2: 23 MMOL/L (ref 21–32)
CREAT SERPL-MCNC: <0.5 MG/DL (ref 0.6–1.1)
GFR SERPL CREATININE-BSD FRML MDRD: >60 ML/MIN/{1.73_M2}
GLUCOSE BLD-MCNC: 90 MG/DL (ref 70–99)
GONADOTROPIN, CHORIONIC (HCG) QUANT: <5 MIU/ML
INR BLD: 0.95 (ref 0.87–1.14)
POTASSIUM SERPL-SCNC: 3.6 MMOL/L (ref 3.5–5.1)
PROTHROMBIN TIME: 12.5 SEC (ref 11.7–14.5)
SODIUM BLD-SCNC: 137 MMOL/L (ref 136–145)

## 2022-12-06 PROCEDURE — 85730 THROMBOPLASTIN TIME PARTIAL: CPT

## 2022-12-06 PROCEDURE — 7100000001 HC PACU RECOVERY - ADDTL 15 MIN: Performed by: SURGERY

## 2022-12-06 PROCEDURE — 3600000012 HC SURGERY LEVEL 2 ADDTL 15MIN: Performed by: SURGERY

## 2022-12-06 PROCEDURE — 6360000002 HC RX W HCPCS: Performed by: NURSE ANESTHETIST, CERTIFIED REGISTERED

## 2022-12-06 PROCEDURE — 2580000003 HC RX 258: Performed by: ANESTHESIOLOGY

## 2022-12-06 PROCEDURE — 80048 BASIC METABOLIC PNL TOTAL CA: CPT

## 2022-12-06 PROCEDURE — 7100000011 HC PHASE II RECOVERY - ADDTL 15 MIN: Performed by: SURGERY

## 2022-12-06 PROCEDURE — 3700000001 HC ADD 15 MINUTES (ANESTHESIA): Performed by: SURGERY

## 2022-12-06 PROCEDURE — 3700000000 HC ANESTHESIA ATTENDED CARE: Performed by: SURGERY

## 2022-12-06 PROCEDURE — A4217 STERILE WATER/SALINE, 500 ML: HCPCS | Performed by: SURGERY

## 2022-12-06 PROCEDURE — 6360000002 HC RX W HCPCS: Performed by: SURGERY

## 2022-12-06 PROCEDURE — 11045 DBRDMT SUBQ TISS EACH ADDL: CPT | Performed by: SURGERY

## 2022-12-06 PROCEDURE — 11042 DBRDMT SUBQ TIS 1ST 20SQCM/<: CPT | Performed by: SURGERY

## 2022-12-06 PROCEDURE — 3600000002 HC SURGERY LEVEL 2 BASE: Performed by: SURGERY

## 2022-12-06 PROCEDURE — 7100000010 HC PHASE II RECOVERY - FIRST 15 MIN: Performed by: SURGERY

## 2022-12-06 PROCEDURE — 7100000000 HC PACU RECOVERY - FIRST 15 MIN: Performed by: SURGERY

## 2022-12-06 PROCEDURE — 2709999900 HC NON-CHARGEABLE SUPPLY: Performed by: SURGERY

## 2022-12-06 PROCEDURE — 85610 PROTHROMBIN TIME: CPT

## 2022-12-06 PROCEDURE — 2500000003 HC RX 250 WO HCPCS: Performed by: NURSE ANESTHETIST, CERTIFIED REGISTERED

## 2022-12-06 PROCEDURE — 84702 CHORIONIC GONADOTROPIN TEST: CPT

## 2022-12-06 PROCEDURE — 2580000003 HC RX 258: Performed by: SURGERY

## 2022-12-06 PROCEDURE — 36415 COLL VENOUS BLD VENIPUNCTURE: CPT

## 2022-12-06 RX ORDER — ONDANSETRON 2 MG/ML
4 INJECTION INTRAMUSCULAR; INTRAVENOUS
Status: CANCELLED | OUTPATIENT
Start: 2022-12-06 | End: 2022-12-07

## 2022-12-06 RX ORDER — KETAMINE HYDROCHLORIDE 10 MG/ML
INJECTION, SOLUTION INTRAMUSCULAR; INTRAVENOUS PRN
Status: DISCONTINUED | OUTPATIENT
Start: 2022-12-06 | End: 2022-12-06 | Stop reason: SDUPTHER

## 2022-12-06 RX ORDER — MIDAZOLAM HYDROCHLORIDE 1 MG/ML
INJECTION INTRAMUSCULAR; INTRAVENOUS PRN
Status: DISCONTINUED | OUTPATIENT
Start: 2022-12-06 | End: 2022-12-06 | Stop reason: SDUPTHER

## 2022-12-06 RX ORDER — SODIUM CHLORIDE 9 MG/ML
INJECTION, SOLUTION INTRAVENOUS PRN
Status: DISCONTINUED | OUTPATIENT
Start: 2022-12-06 | End: 2022-12-06 | Stop reason: HOSPADM

## 2022-12-06 RX ORDER — LIDOCAINE HYDROCHLORIDE 10 MG/ML
1 INJECTION, SOLUTION EPIDURAL; INFILTRATION; INTRACAUDAL; PERINEURAL
Status: DISCONTINUED | OUTPATIENT
Start: 2022-12-06 | End: 2022-12-06 | Stop reason: HOSPADM

## 2022-12-06 RX ORDER — GLYCOPYRROLATE 0.2 MG/ML
INJECTION INTRAMUSCULAR; INTRAVENOUS PRN
Status: DISCONTINUED | OUTPATIENT
Start: 2022-12-06 | End: 2022-12-06 | Stop reason: SDUPTHER

## 2022-12-06 RX ORDER — MAGNESIUM SULFATE HEPTAHYDRATE 500 MG/ML
INJECTION, SOLUTION INTRAMUSCULAR; INTRAVENOUS PRN
Status: DISCONTINUED | OUTPATIENT
Start: 2022-12-06 | End: 2022-12-06 | Stop reason: SDUPTHER

## 2022-12-06 RX ORDER — SODIUM CHLORIDE, SODIUM LACTATE, POTASSIUM CHLORIDE, CALCIUM CHLORIDE 600; 310; 30; 20 MG/100ML; MG/100ML; MG/100ML; MG/100ML
INJECTION, SOLUTION INTRAVENOUS CONTINUOUS
Status: DISCONTINUED | OUTPATIENT
Start: 2022-12-06 | End: 2022-12-06 | Stop reason: HOSPADM

## 2022-12-06 RX ORDER — ONDANSETRON 2 MG/ML
INJECTION INTRAMUSCULAR; INTRAVENOUS PRN
Status: DISCONTINUED | OUTPATIENT
Start: 2022-12-06 | End: 2022-12-06 | Stop reason: SDUPTHER

## 2022-12-06 RX ORDER — ROCURONIUM BROMIDE 10 MG/ML
INJECTION, SOLUTION INTRAVENOUS PRN
Status: DISCONTINUED | OUTPATIENT
Start: 2022-12-06 | End: 2022-12-06 | Stop reason: SDUPTHER

## 2022-12-06 RX ORDER — SODIUM CHLORIDE 0.9 % (FLUSH) 0.9 %
5-40 SYRINGE (ML) INJECTION EVERY 12 HOURS SCHEDULED
Status: DISCONTINUED | OUTPATIENT
Start: 2022-12-06 | End: 2022-12-06 | Stop reason: HOSPADM

## 2022-12-06 RX ORDER — LABETALOL HYDROCHLORIDE 5 MG/ML
10 INJECTION, SOLUTION INTRAVENOUS
Status: CANCELLED | OUTPATIENT
Start: 2022-12-06

## 2022-12-06 RX ORDER — HYDROMORPHONE HCL 110MG/55ML
0.5 PATIENT CONTROLLED ANALGESIA SYRINGE INTRAVENOUS EVERY 5 MIN PRN
Status: CANCELLED | OUTPATIENT
Start: 2022-12-06

## 2022-12-06 RX ORDER — DEXAMETHASONE SODIUM PHOSPHATE 4 MG/ML
INJECTION, SOLUTION INTRA-ARTICULAR; INTRALESIONAL; INTRAMUSCULAR; INTRAVENOUS; SOFT TISSUE PRN
Status: DISCONTINUED | OUTPATIENT
Start: 2022-12-06 | End: 2022-12-06 | Stop reason: SDUPTHER

## 2022-12-06 RX ORDER — CEFAZOLIN 2 G/1
INJECTION, POWDER, FOR SOLUTION INTRAMUSCULAR; INTRAVENOUS
Status: DISCONTINUED
Start: 2022-12-06 | End: 2022-12-06 | Stop reason: HOSPADM

## 2022-12-06 RX ORDER — MAGNESIUM HYDROXIDE 1200 MG/15ML
LIQUID ORAL CONTINUOUS PRN
Status: COMPLETED | OUTPATIENT
Start: 2022-12-06 | End: 2022-12-06

## 2022-12-06 RX ORDER — HYDRALAZINE HYDROCHLORIDE 20 MG/ML
10 INJECTION INTRAMUSCULAR; INTRAVENOUS
Status: CANCELLED | OUTPATIENT
Start: 2022-12-06

## 2022-12-06 RX ORDER — LIDOCAINE HYDROCHLORIDE 20 MG/ML
INJECTION, SOLUTION INFILTRATION; PERINEURAL PRN
Status: DISCONTINUED | OUTPATIENT
Start: 2022-12-06 | End: 2022-12-06 | Stop reason: SDUPTHER

## 2022-12-06 RX ORDER — MEPERIDINE HYDROCHLORIDE 25 MG/ML
12.5 INJECTION INTRAMUSCULAR; INTRAVENOUS; SUBCUTANEOUS EVERY 5 MIN PRN
Status: CANCELLED | OUTPATIENT
Start: 2022-12-06

## 2022-12-06 RX ORDER — FENTANYL CITRATE 50 UG/ML
INJECTION, SOLUTION INTRAMUSCULAR; INTRAVENOUS PRN
Status: DISCONTINUED | OUTPATIENT
Start: 2022-12-06 | End: 2022-12-06 | Stop reason: SDUPTHER

## 2022-12-06 RX ORDER — SODIUM CHLORIDE 0.9 % (FLUSH) 0.9 %
5-40 SYRINGE (ML) INJECTION PRN
Status: DISCONTINUED | OUTPATIENT
Start: 2022-12-06 | End: 2022-12-06 | Stop reason: HOSPADM

## 2022-12-06 RX ORDER — OXYCODONE HYDROCHLORIDE 5 MG/1
5 TABLET ORAL
Status: CANCELLED | OUTPATIENT
Start: 2022-12-06 | End: 2022-12-07

## 2022-12-06 RX ORDER — PROPOFOL 10 MG/ML
INJECTION, EMULSION INTRAVENOUS PRN
Status: DISCONTINUED | OUTPATIENT
Start: 2022-12-06 | End: 2022-12-06 | Stop reason: SDUPTHER

## 2022-12-06 RX ADMIN — PHENYLEPHRINE HYDROCHLORIDE 100 MCG: 10 INJECTION INTRAVENOUS at 14:32

## 2022-12-06 RX ADMIN — MAGNESIUM SULFATE HEPTAHYDRATE 1 G: 500 INJECTION, SOLUTION INTRAMUSCULAR; INTRAVENOUS at 14:25

## 2022-12-06 RX ADMIN — FENTANYL CITRATE 50 MCG: 50 INJECTION, SOLUTION INTRAMUSCULAR; INTRAVENOUS at 14:20

## 2022-12-06 RX ADMIN — MIDAZOLAM 1 MG: 1 INJECTION INTRAMUSCULAR; INTRAVENOUS at 14:17

## 2022-12-06 RX ADMIN — DEXAMETHASONE SODIUM PHOSPHATE 4 MG: 4 INJECTION, SOLUTION INTRAMUSCULAR; INTRAVENOUS at 14:27

## 2022-12-06 RX ADMIN — ROCURONIUM BROMIDE 10 MG: 10 INJECTION, SOLUTION INTRAVENOUS at 14:20

## 2022-12-06 RX ADMIN — PHENYLEPHRINE HYDROCHLORIDE 100 MCG: 10 INJECTION INTRAVENOUS at 14:37

## 2022-12-06 RX ADMIN — SODIUM CHLORIDE, POTASSIUM CHLORIDE, SODIUM LACTATE AND CALCIUM CHLORIDE: 600; 310; 30; 20 INJECTION, SOLUTION INTRAVENOUS at 11:50

## 2022-12-06 RX ADMIN — LIDOCAINE HYDROCHLORIDE 100 MG: 20 INJECTION, SOLUTION INFILTRATION; PERINEURAL at 14:20

## 2022-12-06 RX ADMIN — KETAMINE HYDROCHLORIDE 10 MG: 10 INJECTION, SOLUTION INTRAMUSCULAR; INTRAVENOUS at 14:35

## 2022-12-06 RX ADMIN — MIDAZOLAM 1 MG: 1 INJECTION INTRAMUSCULAR; INTRAVENOUS at 14:13

## 2022-12-06 RX ADMIN — PHENYLEPHRINE HYDROCHLORIDE 200 MCG: 10 INJECTION INTRAVENOUS at 14:28

## 2022-12-06 RX ADMIN — KETAMINE HYDROCHLORIDE 25 MG: 10 INJECTION, SOLUTION INTRAMUSCULAR; INTRAVENOUS at 14:40

## 2022-12-06 RX ADMIN — PHENYLEPHRINE HYDROCHLORIDE 100 MCG: 10 INJECTION INTRAVENOUS at 14:24

## 2022-12-06 RX ADMIN — CEFAZOLIN 2000 MG: 2 INJECTION, POWDER, FOR SOLUTION INTRAMUSCULAR; INTRAVENOUS at 14:11

## 2022-12-06 RX ADMIN — FENTANYL CITRATE 25 MCG: 50 INJECTION, SOLUTION INTRAMUSCULAR; INTRAVENOUS at 14:41

## 2022-12-06 RX ADMIN — GLYCOPYRROLATE 0.1 MG: 0.2 INJECTION, SOLUTION INTRAMUSCULAR; INTRAVENOUS at 14:18

## 2022-12-06 RX ADMIN — PHENYLEPHRINE HYDROCHLORIDE 100 MCG: 10 INJECTION INTRAVENOUS at 14:19

## 2022-12-06 RX ADMIN — PHENYLEPHRINE HYDROCHLORIDE 100 MCG: 10 INJECTION INTRAVENOUS at 14:45

## 2022-12-06 RX ADMIN — PHENYLEPHRINE HYDROCHLORIDE 100 MCG: 10 INJECTION INTRAVENOUS at 14:49

## 2022-12-06 RX ADMIN — PROPOFOL 100 MG: 10 INJECTION, EMULSION INTRAVENOUS at 14:20

## 2022-12-06 RX ADMIN — ONDANSETRON 4 MG: 2 INJECTION INTRAMUSCULAR; INTRAVENOUS at 14:29

## 2022-12-06 RX ADMIN — KETAMINE HYDROCHLORIDE 15 MG: 10 INJECTION, SOLUTION INTRAMUSCULAR; INTRAVENOUS at 14:30

## 2022-12-06 RX ADMIN — FENTANYL CITRATE 25 MCG: 50 INJECTION, SOLUTION INTRAMUSCULAR; INTRAVENOUS at 14:36

## 2022-12-06 ASSESSMENT — PAIN DESCRIPTION - PAIN TYPE: TYPE: SURGICAL PAIN

## 2022-12-06 ASSESSMENT — PAIN SCALES - GENERAL
PAINLEVEL_OUTOF10: 0

## 2022-12-06 ASSESSMENT — LIFESTYLE VARIABLES: SMOKING_STATUS: 0

## 2022-12-06 NOTE — PROGRESS NOTES
Pt discharged home per private vehicle with a  responsible adult who states they will be with them for the next 24 hours. Wheeled to front of the hospital myself. Discussed d/c instructions with pt and friend/family at bedside. Verbalized understanding. Provided pt/family with written instructions.

## 2022-12-06 NOTE — PROGRESS NOTES
Pt meets d/c criteria for phase 1 in PACU and has been seen by anesthesia. Ok to transition to phase 2 care. Will alert anyone in waiting room for them and the nursing unit if applicable. Will continue to monitor for safety and comfort.     Bea VALDEZN, RN  Pre-Op/Recovery   Same Day Surgery

## 2022-12-06 NOTE — PROGRESS NOTES
Pt in bed resting, pt states that it is time for her to be straight cath per home schedule. Spoke with Dr. Boudreaux Stone and ok to straight cath pt. Pt straight cath for 120ml of tea colored urine. Pt states that she was being treated for a UTI and just finished antibiotics.

## 2022-12-06 NOTE — PROGRESS NOTES
Pt arrived to PACU from OR in stable condition and is alert to verbal stimuli. Respirations are even on 6L O2 per SM. Skin warm, dry, and with appropriate for ethnicity color. Abd is soft. Pain is tolerable at this time.   Sacral and left ischial surgical site(s) intact with dressing= saline soaked kerlix, 4X4, ABD, paper tape; CDI with mesh panty      S/P: wound debridement of sacral decubitus and left ischial area with Dr. Ac Villalobos at [facility], RN  Pre-Op/Recovery   Same Day Surgery

## 2022-12-06 NOTE — H&P
[facility] and Laparoscopic [facility]  History and Physical Update        CV-rrr  Lung-CTAB    I have reviewed the history and physical and examined the patient and find no relevant changes. I have reviewed with the patient and/or family the risks, benefits, and alternatives to the procedure.     /71   Pulse (!) 110   Temp (!) 96.6 °F (35.9 °C) (Temporal)   Resp 18   Ht 5' 2\" (1.575 m)   Wt 100 lb (45.4 kg)   LMP 11/12/2022 (Approximate)   SpO2 99%   BMI 18.29 kg/m²     Sukumar Paula MD  12/6/2022

## 2022-12-06 NOTE — BRIEF OP NOTE
Brief Postoperative Note      Patient: Thea Barrera  YOB: 1980  MRN: [id number]    Date of Procedure: 12/6/2022    Pre-Op Diagnosis: L89.154 SACRAL DECUBITUS ULCER  L89.329 LEFT ISCHIAL ULCER    Post-Op Diagnosis: Same       Procedure(s):  WOUND DEBRIDEMENT OF SACRAL DECUBITUS  WOUND DEBRIDEMENT OF LEFT ISCHIAL ULCER    Surgeon(s):  Shannon Malik MD    Assistant:  Surgical Assistant: Loreta Chavez    Anesthesia: General    Estimated Blood Loss (mL): Minimal    Complications: None    Specimens:   * No specimens in log *    Implants:  * No implants in log *      Drains: * No LDAs found *    Findings: debridement of sacral and L ischial ulcer    Sacrum  Predebridement measurement-1 x 1.2 x 0.5 cm  Postdebridement measurement-1.4 x 1.9 x 0.8 cm  Area debrided equals 2.66 sq cm    Left ischium  Predebridement measurement-0.5 x 0.5 x 1 cm  Postdebridement measurement-7 x 2.8 x 2.5 cm  Area debrided equals 19.6 sq cm    Total debrided area equals 22.26 sq cm     Electronically signed by Shannon Malik MD on 12/6/2022 at 2:46 PM

## 2022-12-06 NOTE — ANESTHESIA PRE PROCEDURE
Department of Anesthesiology  Preprocedure Note       Name:  Lin Merrill   Age:  38 y.o.  :  1980                                          MRN:  [id number]         Date:  2022      Surgeon: Alina Thompson):  Flakita Cassidy MD    Procedure: Procedure(s):  WOUND DEBRIDEMENT OF SACRAL DECUBITUS  WOUND DEBRIDEMENT OF LEFT ISCHIAL ULCER    Medications prior to admission:   Prior to Admission medications    Medication Sig Start Date End Date Taking?  Authorizing Provider   oxybutynin (DITROPAN) 5 MG tablet Take 5 mg by mouth 3 times daily   Yes Historical Provider, MD   nitrofurantoin, macrocrystal-monohydrate, (MACROBID) 100 MG capsule Take 100 mg by mouth 2 times daily   Yes Historical Provider, MD   alendronate (FOSAMAX) 70 MG tablet Take 70 mg by mouth every 7 days 3/29/21   Historical Provider, MD   MYRBETRIQ 50 MG TB24  21   Historical Provider, MD   pantoprazole (PROTONIX) 40 MG tablet Take 1 tablet by mouth daily 21   Historical Provider, MD   montelukast (SINGULAIR) 10 MG tablet Take 10 mg by mouth nightly 11/10/20   Historical Provider, MD   magnesium oxide (MAG-OX) 400 MG tablet Take 400 mg by mouth every evening 20   Historical Provider, MD   vitamin D3 (CHOLECALCIFEROL) 125 MCG (5000 UT) TABS tablet Take 1 tablet by mouth daily  Patient not taking: Reported on 2022   Historical Provider, MD   simethicone (MYLICON) 80 MG chewable tablet Take 80 mg by mouth daily as needed for Flatulence 4/10/19   Historical Provider, MD   warfarin (COUMADIN) 2.5 MG tablet Take 2.5 mg by mouth See Admin Instructions Unsure of dose sequence    Historical Provider, MD   solifenacin (VESICARE) 5 MG tablet Take 5 mg by mouth daily  Patient not taking: Reported on 2022    Historical Provider, MD   trospium (SANCTURA) 20 MG tablet Take 20 mg by mouth 2 times daily  Patient not taking: Reported on 2022 8/5/15   Historical Provider, MD   cetirizine (ZYRTEC) 10 MG tablet Take 10 mg by mouth daily. Historical Provider, MD   oxyCODONE-acetaminophen (PERCOCET) 5-325 MG per tablet Take 1 tablet by mouth every 6 hours as needed. Historical Provider, MD   baclofen (LIORESAL) 10 MG tablet Take 10 mg by mouth 3 times daily. Historical Provider, MD   calcium-vitamin D (OSCAL-500) 500-200 MG-UNIT per tablet Take 1 tablet by mouth daily. Historical Provider, MD   ferrous sulfate 325 (65 FE) MG tablet Take 325 mg by mouth daily (with breakfast). Historical Provider, MD   potassium chloride SA (K-DUR;KLOR-CON) 20 MEQ tablet Take 20 mEq by mouth 2 times daily    Historical Provider, MD   therapeutic multivitamin-minerals (THERAGRAN-M) tablet Take 1 tablet by mouth daily. Historical Provider, MD       Current medications:    No current facility-administered medications for this encounter. Allergies:     Allergies   Allergen Reactions    Ambien [Zolpidem Tartrate]     Ampicillin     Bactrim     Morphine     Risperidone And Related     Vicodin [Hydrocodone-Acetaminophen]     Zoloft        Problem List:    Patient Active Problem List   Diagnosis Code    DVT prophylaxis Z29.9    Pulmonary emboli (HCC) I26.99    Long term (current) use of anticoagulants Z79.01    Sacral decubitus ulcer, stage IV ([facility]) L89.154       Past Medical History:        Diagnosis Date    Anemia     DVT (deep venous thrombosis) ([facility])     MRSA (methicillin resistant staph aureus) culture positive 11/24/2017    urine    MS (multiple sclerosis) (HCC)     Pulmonary embolism (HCC)     Sacral decubitus ulcer, stage IV ([facility] [address] 5/17/2022       Past Surgical History:        Procedure Laterality Date    FINGER AMPUTATION      TUNNELED VENOUS PORT PLACEMENT         Social History:    Social History     Tobacco Use    Smoking status: Never    Smokeless tobacco: Never   Substance Use Topics    Alcohol use: Not Currently     Comment: socially                                Counseling given: Not Answered      Vital Signs (Current):   Vitals:    12/02/22 0931   Weight: 100 lb (45.4 kg)   Height: 5' 2\" (1.575 m)                                              BP Readings from Last 3 Encounters:   11/29/22 96/69   11/08/22 (!) 91/50   11/01/22 101/72       NPO Status:                                                                                 BMI:   Wt Readings from Last 3 Encounters:   12/02/22 100 lb (45.4 kg)   11/29/22 96 lb 12.8 oz (43.9 kg)   11/01/22 97 lb (44 kg)     Body mass index is 18.29 kg/m². CBC:   Lab Results   Component Value Date/Time    WBC 6.5 06/27/2022 12:40 PM    RBC 2.83 06/27/2022 12:40 PM    HGB 7.9 06/27/2022 12:40 PM    HCT 24.2 06/27/2022 12:40 PM    MCV 85.4 06/27/2022 12:40 PM    RDW 14.7 06/27/2022 12:40 PM     06/27/2022 12:40 PM       CMP:   Lab Results   Component Value Date/Time     06/27/2022 12:40 PM    K 2.6 06/27/2022 12:40 PM    K 3.1 09/19/2020 01:35 PM     06/27/2022 12:40 PM    CO2 25 06/27/2022 12:40 PM    BUN 4 06/27/2022 12:40 PM    CREATININE <0.5 06/27/2022 12:40 PM    GFRAA >60 06/27/2022 12:40 PM    GFRAA >60 07/20/2012 04:43 PM    AGRATIO 1.0 09/19/2020 01:35 PM    LABGLOM >60 06/27/2022 12:40 PM    GLUCOSE 74 06/27/2022 12:40 PM    PROT 4.8 06/27/2022 12:40 PM    CALCIUM 8.1 06/27/2022 12:40 PM    BILITOT <0.2 06/27/2022 12:40 PM    ALKPHOS 114 06/27/2022 12:40 PM    AST 12 06/27/2022 12:40 PM    ALT 8 06/27/2022 12:40 PM       POC Tests: No results for input(s): POCGLU, POCNA, POCK, POCCL, POCBUN, POCHEMO, POCHCT in the last 72 hours.     Coags:   Lab Results   Component Value Date/Time    PROTIME 47.9 06/17/2022 03:03 PM    PROTIME 25.9 09/21/2018 04:56 PM    INR 1.1 11/29/2022 10:43 AM    INR 5.12 06/17/2022 03:03 PM    APTT 46.4 09/19/2020 01:35 PM       HCG (If Applicable):   Lab Results   Component Value Date    PREGTESTUR Negative 07/25/2016        ABGs: No results found for: PHART, PO2ART, YVW8UDO, BYH1VVG, BEART, E7EOJGME Type & Screen (If Applicable):  No results found for: LABABO, LABRH    Drug/Infectious Status (If Applicable):  No results found for: HIV, HEPCAB    COVID-19 Screening (If Applicable): No results found for: COVID19        Anesthesia Evaluation  Patient summary reviewed and Nursing notes reviewed no history of anesthetic complications:   Airway: Mallampati: III  TM distance: >3 FB   Neck ROM: full  Mouth opening: > = 3 FB   Dental: normal exam   (+) upper braces and lower braces      Pulmonary:Negative Pulmonary ROS and normal exam  breath sounds clear to auscultation      (-) COPD, asthma, sleep apnea and not a current smoker                           Cardiovascular:Negative CV ROS        (-) hypertension, past MI, CAD, CABG/stent, dysrhythmias,  angina and  CHF (echo 2022 EF 55-60 no rwma)    ECG reviewed  Rhythm: regular  Rate: normal  Echocardiogram reviewed                  Neuro/Psych:   (+) neuromuscular disease (MS, Spastic quadriparesis, transverse myelitis): multiple sclerosis,    (-) seizures, TIA and CVA           GI/Hepatic/Renal:   (+) GERD: well controlled,      (-) liver disease and no renal disease       Endo/Other: Negative Endo/Other ROS       (-) diabetes mellitus, hypothyroidism, hyperthyroidism               Abdominal:             Vascular:   + DVT, PE (on ac as outpt, bridged for OR). Other Findings:           Anesthesia Plan      general     ASA 3       Induction: intravenous. MIPS: Postoperative opioids intended and Prophylactic antiemetics administered. Anesthetic plan and risks discussed with patient. Plan discussed with CRNA.                     Christophe Cohen MD   12/6/2022

## 2022-12-06 NOTE — ANESTHESIA POSTPROCEDURE EVALUATION
Department of Anesthesiology  Postprocedure Note    Patient: Simin Rosario  MRN: [id number]  YOB: 1980  Date of evaluation: 12/6/2022      Procedure Summary     Date: 12/06/22 Room / Location: [address]    Anesthesia Start: 1716 Anesthesia Stop: 5813    Procedures:       WOUND DEBRIDEMENT OF SACRAL DECUBITUS (Coccyx)      WOUND DEBRIDEMENT OF LEFT ISCHIAL ULCER (Left: Leg Lower) Diagnosis:       Pressure injury of sacral region, stage 4 (HCC)      Left ischial pressure sore, unspecified pressure ulcer stage      (L89.154 SACRAL DECUBITUS ULCER)      (L89.329 LEFT ISCHIAL ULCER)    Surgeons: Randolph Wick MD Responsible Provider: Marisel Sanz MD    Anesthesia Type: general ASA Status: 3          Anesthesia Type: No value filed.     John Phase I: John Score: 10    John Phase II: John Score: 10      Anesthesia Post Evaluation    Patient location during evaluation: PACU  Patient participation: complete - patient participated  Level of consciousness: awake and alert  Airway patency: patent  Nausea & Vomiting: no nausea and no vomiting  Complications: no  Cardiovascular status: blood pressure returned to baseline  Respiratory status: acceptable  Hydration status: euvolemic  Multimodal analgesia pain management approach

## 2022-12-07 NOTE — OP NOTE
[place] [address]                     [address] [facility], [address] [facility]                                OPERATIVE REPORT    PATIENT NAME: Renata Hernandez                    :        1980  MED REC NO:   [id number]                          ROOM:  ACCOUNT NO:   [de-identified]                           ADMIT DATE: 2022  PROVIDER:     Zhane Vega MD    DATE OF PROCEDURE:  2022    PREOPERATIVE DIAGNOSES:  Stage IV sacral decubitus ulcer, stage IV left  ischial ulcer. POSTOPERATIVE DIAGNOSES:  Stage IV sacral decubitus ulcer, stage IV left  ischial ulcer. OPERATION PERFORMED:  Debridement of skin and subcutaneous tissue of  sacral ulcer and debridement of skin and subcutaneous tissue of left  ischial ulcer. Total surface area debrided was 22.26 cm2. SURGEON:  Zhane Vega MD    ANESTHESIA:  General.    ESTIMATED BLOOD LOSS:  Minimal.    COMPLICATIONS:  None. SPECIMEN:  None. OPERATIVE INDICATIONS AND CONSENT:  The patient is a 82-year-old female  with MS who was brought in today for debridement of chronic ulcers of  the sacral and left ischial regions. She was explained the risks,  benefits and possible complications. DETAILS OF THE PROCEDURE:  The patient was brought to the operative  suite, after general anesthetic was positioned in the right lateral  decubitus position. She was then prepped and draped in usual sterile  fashion. The pre-debridement measurement of the sacrum was 1 x 1.2 x 0.5 cm. The  ulcer was debrided sharply with a curette. We debrided all necrotic  skin and subcutaneous tissue. Hemostasis was achieved with cautery and  pressure. The post-debridement measurement was 1.4 x 1.9 x 0.8 cm. The  area debrided at this site was 2.66 cm2. The left ischium had a small opening. It was previously tracked  significantly in the inferior direction, but appeared no probe was able  to be tracked in this direction. There was a large pocket located  superiorly. The pre-debridement measurement was 0.5 x 0.51 cm. We  opened the pocket in the superior direction. The pocket was very wide. Therefore somewhat of a capsule was nearly down to the bone. Once the  area was completely opened up, the entire surface area was debrided  using a sharp curette. Hemostasis was achieved with pressure and  cautery. The post-debridement measurement was 7 x 2.8 x 2.5 cm. This  gave us debrided area at this site of 19.6 cm2. Between the two ulcers, we debrided a total of 2.26 cm2. Dressings were  applied at both sites. The patient tolerated the procedure without  difficulty and transferred to recovery room in stable condition. Heidy Flores.  Dean Guzman MD    D: 12/06/2022 15:16:29       T: 12/06/2022 15:19:56     [email or web address]  Job#: [id number]     Doc#: [id number]    CC:

## 2022-12-12 NOTE — DISCHARGE INSTRUCTIONS
[address], [address]  Telephone: [phone]     Discharge Instructions     Important reminders:     **If you have any signs and symptoms of illness (Cough, fever, congestion, nausea, vomiting, diarrhea, etc.) please call the wound care center prior to your appointment. 1. Increase Protein intake for optimal wound healing  2. No added salt to reduce any swelling  3. If diabetic, maintain good glucose control  4. If you smoke, smoking prohibits wound healing, we ask that you refrain from smoking. 5. When taking antibiotics take the entire prescription as ordered. Do not stop taking until medication is all gone unless otherwise instructed. 6. Exercise as tolerated. 7. Keep weight off wounds and reposition every 2 hours if applicable. 8. If wound(s) is on your lower extremity, elevate legs to the level of the heart or above for 30 minutes 4-5 times a day and/or when sitting. Avoid standing for long periods of time. 9. Do not get wounds wet in bath or shower unless otherwise instructed by your physician. If your wound is on your foot or leg, you may purchase a cast bag. Please ask at the pharmacy. If Vascular testing is ordered, please call [address] [place] [address] [phone]) to schedule. Vascular tests ordered by Wound Care Physicians may take up to 2 hours to complete. Please keep that in mind when scheduling. If Vascular testing is scheduled, please bring supplies to replace your dressing after testing is done. The vascular department does not stock supplies. Wound: Sacrum, Left ischium     With each dressing change, rinse wounds with 0.9% Saline. (May use wound wash or soft contact solution. Both can be purchased at a local drug store). If unable to obtain saline, may use a gentle soap and water.      Dressing care: Left ischium- moist to dry with roll gauze (use only one piece to pack all around wound & leave tail to be able to remove) dry dressing- change daily. Sacrum- moist to dry, dry dressing- change daily and as needed for drainage or soilage. Increase protein, continue to drink Myron. Turn & reposition every 1-2 hours and as needed for pressure relief and comfort. Up in chair for meals only. Home Care Agency/Facility:      Your wound-care supplies will be provided by: [address] f: [phone] f: [phone] p: [phone] [email or web address]    Please note, depending on your insurance coverage, you may have out-of-pocket expenses for these supplies. Someone from the company should call you to confirm your order and discuss those potential costs before they ship your products -- please anticipate that call. If your out-of-pocket cost could be substantial, Many companies have financial hardship programs for patients who qualify, so please ask about that if you might need a hand. If you have any questions about your supplies or your potential out-of-pocket costs, or if you need to place an order for a refill of supplies (typically monthly), please call the company directly. Your  is Carli Marcial     Follow up with Dr Stan Varma In 2 week(s) in the wound care center. Wound Care Center Information: Should you experience any significant changes in your wound(s) or have questions about your wound care, please contact the [employer] at [phone] Monday  - Thursday 8:00 am - 4:00 pm and Friday 8:00 am - 1:00pm. If you need help with your wound outside these hours and cannot wait until we are again available, contact your PCP or go to the hospital emergency room. PLEASE NOTE: IF YOU ARE UNABLE TO OBTAIN WOUND SUPPLIES, CONTINUE TO USE THE SUPPLIES YOU HAVE AVAILABLE UNTIL YOU ARE ABLE TO REACH US. IT IS MOST IMPORTANT TO KEEP THE WOUND COVERED AT ALL TIMES. Patient Experience     Thank you for trusting us with your care.   You may receive a survey from a company called [employer] asking for your feedback. We would appreciate it if you took a few minutes to share your experience.   Your input is very valuable to us

## 2022-12-12 NOTE — H&P
Katalina Laughlin     HPI: 38year old female here for debridement of pressure ulcers   Past Medical History:   Diagnosis Date    Anemia     DVT (deep venous thrombosis) ([facility])     MRSA (methicillin resistant staph aureus) culture positive 11/24/2017    urine    MS (multiple sclerosis) ([facility] [address]     Pulmonary embolism (HCC)     Sacral decubitus ulcer, stage IV ([facility] [address] 5/17/2022       Past Surgical History:   Procedure Laterality Date    FINGER AMPUTATION      LEG SURGERY Left 12/6/2022    WOUND DEBRIDEMENT OF LEFT ISCHIAL ULCER performed by Jocelyn Krishna MD at [address] [facility] N/A 12/6/2022    WOUND DEBRIDEMENT OF SACRAL DECUBITUS performed by Jocelyn Krishna MD at [facility]         Social History     Socioeconomic History    Marital status: Single     Spouse name: Not on file    Number of children: Not on file    Years of education: Not on file    Highest education level: Not on file   Occupational History    Not on file   Tobacco Use    Smoking status: Never    Smokeless tobacco: Never   Vaping Use    Vaping Use: Never used   Substance and Sexual Activity    Alcohol use: Not Currently     Comment: socially    Drug use: No    Sexual activity: Not on file   Other Topics Concern    Not on file   Social History Narrative    Not on file     Social Determinants of Health     Financial Resource Strain: Not on file   Food Insecurity: Not on file   Transportation Needs: Not on file   Physical Activity: Not on file   Stress: Not on file   Social Connections: Not on file   Intimate Partner Violence: Not on file   Housing Stability: Not on file       Allergies: Allergies   Allergen Reactions    Ambien [Zolpidem Tartrate]     Ampicillin     Bactrim     Morphine     Risperidone And Related     Vicodin [Hydrocodone-Acetaminophen]     Zoloft        Prior to Admission medications    Medication Sig Start Date End Date Taking?  Authorizing Provider   oxybutynin (DITROPAN) 5 potassium chloride SA (K-DUR;KLOR-CON) 20 MEQ tablet Take 20 mEq by mouth 2 times daily    Historical Provider, MD   therapeutic multivitamin-minerals (THERAGRAN-M) tablet Take 1 tablet by mouth daily. Historical Provider, MD       Active Problems:    Left ischial pressure sore, stage IV (HCC)  Resolved Problems:    * No resolved hospital problems. *      Blood pressure (!) 126/99, pulse (!) 110, temperature 96.9 °F (36.1 °C), temperature source Temporal, resp. rate 16, height 5' 2\" (1.575 m), weight 100 lb (45.4 kg), last menstrual period 11/12/2022, SpO2 100 %. Review of Systems    Physical Exam  Cardiovascular:      Rate and Rhythm: Normal rate and regular rhythm. Pulmonary:      Effort: Pulmonary effort is normal.      Breath sounds: Normal breath sounds.        Assessment:  Sacral ulcer  L ischial ulcer    Plan:  Debridement of ulcers    Mark Roy MD  12/12/2022

## 2022-12-13 ENCOUNTER — ANTI-COAG VISIT (OUTPATIENT)
Dept: PHARMACY | Age: 42
End: 2022-12-13
Payer: MEDICAID

## 2022-12-13 ENCOUNTER — HOSPITAL ENCOUNTER (OUTPATIENT)
Dept: WOUND CARE | Age: 42
Discharge: HOME | End: 2022-12-13
Payer: MEDICAID

## 2022-12-13 VITALS
WEIGHT: 100.2 LBS | SYSTOLIC BLOOD PRESSURE: 101 MMHG | BODY MASS INDEX: 18.33 KG/M2 | RESPIRATION RATE: 16 BRPM | DIASTOLIC BLOOD PRESSURE: 70 MMHG | HEART RATE: 116 BPM | TEMPERATURE: 97.1 F

## 2022-12-13 DIAGNOSIS — I26.09 OTHER ACUTE PULMONARY EMBOLISM WITH ACUTE COR PULMONALE (HCC): ICD-10-CM

## 2022-12-13 DIAGNOSIS — L89.154 SACRAL DECUBITUS ULCER, STAGE IV (HCC): Primary | ICD-10-CM

## 2022-12-13 DIAGNOSIS — Z29.9 DVT PROPHYLAXIS: Primary | ICD-10-CM

## 2022-12-13 DIAGNOSIS — Z79.01 LONG TERM (CURRENT) USE OF ANTICOAGULANTS: ICD-10-CM

## 2022-12-13 LAB — INTERNATIONAL NORMALIZATION RATIO, POC: 1.1

## 2022-12-13 PROCEDURE — 99212 OFFICE O/P EST SF 10 MIN: CPT

## 2022-12-13 PROCEDURE — 99214 OFFICE O/P EST MOD 30 MIN: CPT

## 2022-12-13 PROCEDURE — 85610 PROTHROMBIN TIME: CPT

## 2022-12-13 PROCEDURE — 99213 OFFICE O/P EST LOW 20 MIN: CPT | Performed by: SURGERY

## 2022-12-13 RX ORDER — GENTAMICIN SULFATE 1 MG/G
OINTMENT TOPICAL ONCE
OUTPATIENT
Start: 2022-12-13 | End: 2022-12-13

## 2022-12-13 RX ORDER — LIDOCAINE HYDROCHLORIDE 20 MG/ML
JELLY TOPICAL ONCE
OUTPATIENT
Start: 2022-12-13 | End: 2022-12-13

## 2022-12-13 RX ORDER — GINSENG 100 MG
CAPSULE ORAL ONCE
OUTPATIENT
Start: 2022-12-13 | End: 2022-12-13

## 2022-12-13 RX ORDER — BACITRACIN ZINC AND POLYMYXIN B SULFATE 500; 1000 [USP'U]/G; [USP'U]/G
OINTMENT TOPICAL ONCE
OUTPATIENT
Start: 2022-12-13 | End: 2022-12-13

## 2022-12-13 RX ORDER — BACITRACIN, NEOMYCIN, POLYMYXIN B 400; 3.5; 5 [USP'U]/G; MG/G; [USP'U]/G
OINTMENT TOPICAL ONCE
OUTPATIENT
Start: 2022-12-13 | End: 2022-12-13

## 2022-12-13 RX ORDER — LIDOCAINE 50 MG/G
OINTMENT TOPICAL ONCE
OUTPATIENT
Start: 2022-12-13 | End: 2022-12-13

## 2022-12-13 RX ORDER — LIDOCAINE 40 MG/G
CREAM TOPICAL ONCE
Status: DISCONTINUED | OUTPATIENT
Start: 2022-12-13 | End: 2022-12-14 | Stop reason: HOSPADM

## 2022-12-13 RX ORDER — CLOBETASOL PROPIONATE 0.5 MG/G
OINTMENT TOPICAL ONCE
OUTPATIENT
Start: 2022-12-13 | End: 2022-12-13

## 2022-12-13 RX ORDER — LIDOCAINE HYDROCHLORIDE 40 MG/ML
SOLUTION TOPICAL ONCE
OUTPATIENT
Start: 2022-12-13 | End: 2022-12-13

## 2022-12-13 RX ORDER — LIDOCAINE 40 MG/G
CREAM TOPICAL ONCE
OUTPATIENT
Start: 2022-12-13 | End: 2022-12-13

## 2022-12-13 RX ORDER — BETAMETHASONE DIPROPIONATE 0.05 %
OINTMENT (GRAM) TOPICAL ONCE
OUTPATIENT
Start: 2022-12-13 | End: 2022-12-13

## 2022-12-13 ASSESSMENT — PAIN DESCRIPTION - ORIENTATION: ORIENTATION: LEFT;UPPER

## 2022-12-13 ASSESSMENT — PAIN DESCRIPTION - PAIN TYPE: TYPE: SURGICAL PAIN

## 2022-12-13 ASSESSMENT — PAIN SCALES - GENERAL: PAINLEVEL_OUTOF10: 8

## 2022-12-13 ASSESSMENT — PAIN DESCRIPTION - DESCRIPTORS: DESCRIPTORS: DISCOMFORT

## 2022-12-13 ASSESSMENT — PAIN DESCRIPTION - LOCATION: LOCATION: LEG

## 2022-12-13 NOTE — PROGRESS NOTES
Ms. Kaylee Krishna is a 38 y.o. y/o female with history of DVT, PE in about 2010. She presents today for anticoagulation monitoring and adjustment. Pertinent PMH: DVT assoc with PICC line  Hx of multiple sclerosis,     Patient Reported Findings:  Yes     No  []   [x]       Patient verifies current dosing regimen as listed---> does not know dose, but takes what RN gives her but then states that she might have taken 5 mg daily but took old dose. Does not know dose, uses AVS but AVS ran out last week so she is unsure after that --> states that aide followed avs for doses --> unable to verify --> unable to state dose  []   [x]       S/S bleeding/bruising/swelling/SOB -denies   []   [x]       Blood in urine or stool - denies   [x]   []       Procedures scheduled in the future at this time  ---> having wound procedure on 12/6, needs to hold warfarin for 5 days and ridge with lovenox. Also having botox inj, unsure if needs to hold warfarin (last year when had botox inj held 5 days and bridged with lovenox). Pt states she has an appt today to discuss procedure since having wound procedure week prior --> pt unsure when she resumed warfarin after procedure. Called and spoke to prasanth HEWITT, she believes pt resumed warfarin on 12/6 and does not see any missing doses. Botox procedure r/s for 1/25/23   [x]   []       Missed Dose -thinks missed at least 2 doses --> thinks missed one dose within last week but unable to state what day. []   [x]       Extra Dose - denies  [x]   []       Change in medications-using perc and tylenol prn for pain with new braces- states this will continue---> takes tylenol prn ---> no more tylenol, no other changes in meds --> unsure if patient is still taking cipro, new multivit and will restart biotin soon. ---> cipro then levo and doxy, states has 1-2 days left --> on macrobid recently --> started MVI flinstones (contain 60 mcg of vit k).  No abx   [x]   []       Change in health/diet/appetite Normal vegetable is about twice a week and generally a low vitamin K. When she has broccoli it would be in a stir truong. May have a salad only once a month.---> had vegetables, no significant vit k ---> had greens twice since last visit. States that she had done a detox with tea (haven and turmeric tea)  had greens 2-3 times in the past week, no NVD --> had salad, greens and cabbage this week --> unsure if she had any vit k---> had vit k multiple times in the past week --> had salad yesterday   []   [x]       Change in alcohol use adamant that she did not drink alcohol while in [place] --> states now that she had one glass of alcohol in [facility]. none recently --> denies---> had a large mixed drink in last week---> denies    []   [x]       Change in activity  []   [x]       Hospital admission-   []   [x]       Emergency department visit- 10/26- ER, cipro, INR 2.0      11/21- for back pain. INR 1, was given 10 mg of warfarin that day prior to leaving. Was prescribed macrobid x 7 d.   []   [x]       Other complaints   Clinical Outcomes:  Yes     No  []   [x]       Major bleeding event  []   [x]       Thromboembolic event    Duration of warfarin therapy: Indefinitely  INR Range:  2.0-3.0     Continues to miss apts and miss doses. Pt rarely offers up any information to determine cause of INR fluctuations. Blanco Rhoades RN [facility]. Will need to call and provide instructions after apts [phone]. INR is 1.1 today after resuming warfarin last week from procedure   Called and spoke to Blanco Rhoades. She verified pt received doses of warfarin this past week, did not see any missed doses so will significantly increase weekly dose   Take 5mg for 3 days then increase weekly dose to 5 mg on Tues and 3.75 mg all other days of the week (15% inc)  Resume lovenox injections twice daily until therapeutic   Lovenox script called in to [facility] pharmacy   Encouraged to maintain a consistency of vegetables/salads.    Recheck INR on Fri   Printed AVS until  in case pt miss appt on Fri.    Also scheduled for  since has wound care that day and schedule filling     **consent form signed 10/27/2021    Referring PCP is Dr. Carol Cam results  INR (no units)   Date Value   2022 0.95   2022 5.12 (HH)   10/05/2020 8.93 (HH)   2020 3.22 (H)     INR,(POC) (no units)   Date Value   2022 1.1   2022 1.2   10/18/2022 2.1   10/11/2022 1.7     For Pharmacy Admin Tracking Only    Intervention Detail: Adherence Monitorin and Dose Adjustment: 1, reason: Therapy Optimization  Total # of Interventions Recommended: 2  Total # of Interventions Accepted: 2  Time Spent (min): 20

## 2022-12-13 NOTE — PLAN OF CARE
Discharge instructions given. Patient verbalized understanding. [facility] Declines NPWT at this time.

## 2022-12-13 NOTE — PROGRESS NOTES
Jocelyne Camilo  Progress Note       Scottie Otto  AGE: 38 y.o. GENDER: female  : 1980  TODAY'S DATE:  2022    Subjective:     Chief Complaint   Patient presents with    Wound Check     [place] left         HISTORY of PRESENT ILLNESS HPI     Scottie Otto is a 38 y.o. female who presents today for wound evaluation. History of Wound: Pressure ulcerations    Wound Pain:  moderate  Severity:  5 / 10   Wound Type:  pressure  Modifying Factors:  chronic pressure and decreased mobility  Associated Signs/Symptoms:  none        PAST MEDICAL HISTORY        Diagnosis Date    Anemia     DVT (deep venous thrombosis) ([facility])     MRSA (methicillin resistant staph aureus) culture positive 2017    urine    MS (multiple sclerosis) ([facility]     Pulmonary embolism ([facility])     Sacral decubitus ulcer, stage IV ([facility] 2022       PAST SURGICAL HISTORY    Past Surgical History:   Procedure Laterality Date    FINGER AMPUTATION      LEG SURGERY Left 2022    WOUND DEBRIDEMENT OF LEFT ISCHIAL ULCER performed by Kathy Coronado MD at [address] [facility] N/A 2022    WOUND DEBRIDEMENT OF SACRAL DECUBITUS performed by Kathy Coronado MD at [address] [facility]    History reviewed. No pertinent family history.     SOCIAL HISTORY    Social History     Tobacco Use    Smoking status: Never    Smokeless tobacco: Never   Vaping Use    Vaping Use: Never used   Substance Use Topics    Alcohol use: Not Currently     Comment: socially    Drug use: No       ALLERGIES    Allergies   Allergen Reactions    Ambien [Zolpidem Tartrate]     Ampicillin     Bactrim     Morphine     Risperidone And Related     Vicodin [Hydrocodone-Acetaminophen]     Zoloft        MEDICATIONS    Current Outpatient Medications on File Prior to Encounter   Medication Sig Dispense Refill    oxybutynin (DITROPAN) 5 MG tablet Take 5 mg by mouth 3 times daily nitrofurantoin, macrocrystal-monohydrate, (MACROBID) 100 MG capsule Take 100 mg by mouth 2 times daily      alendronate (FOSAMAX) 70 MG tablet Take 70 mg by mouth every 7 days      MYRBETRIQ 50 MG TB24       pantoprazole (PROTONIX) 40 MG tablet Take 1 tablet by mouth daily      montelukast (SINGULAIR) 10 MG tablet Take 10 mg by mouth nightly      magnesium oxide (MAG-OX) 400 MG tablet Take 400 mg by mouth every evening      vitamin D3 (CHOLECALCIFEROL) 125 MCG (5000 UT) TABS tablet Take 1 tablet by mouth daily (Patient not taking: Reported on 12/2/2022)      simethicone (MYLICON) 80 MG chewable tablet Take 80 mg by mouth daily as needed for Flatulence      warfarin (COUMADIN) 2.5 MG tablet Take 2.5 mg by mouth See Admin Instructions Unsure of dose sequence      solifenacin (VESICARE) 5 MG tablet Take 5 mg by mouth daily (Patient not taking: Reported on 12/2/2022)      trospium (SANCTURA) 20 MG tablet Take 20 mg by mouth 2 times daily (Patient not taking: Reported on 12/2/2022)      cetirizine (ZYRTEC) 10 MG tablet Take 10 mg by mouth daily. oxyCODONE-acetaminophen (PERCOCET) 5-325 MG per tablet Take 1 tablet by mouth every 6 hours as needed. baclofen (LIORESAL) 10 MG tablet Take 10 mg by mouth 3 times daily. calcium-vitamin D (OSCAL-500) 500-200 MG-UNIT per tablet Take 1 tablet by mouth daily. ferrous sulfate 325 (65 FE) MG tablet Take 325 mg by mouth daily (with breakfast). potassium chloride SA (K-DUR;KLOR-CON) 20 MEQ tablet Take 20 mEq by mouth 2 times daily      therapeutic multivitamin-minerals (THERAGRAN-M) tablet Take 1 tablet by mouth daily. No current facility-administered medications on file prior to encounter.        REVIEW OF SYSTEMS    Constitutional: negative  Eyes: negative  Ears, nose, mouth, throat, and face: negative  Gastrointestinal: negative  Behavioral/Psych: negative      Objective:      /70   Pulse (!) 116   Temp 97.1 °F (36.2 °C) (Infrared) Resp 16   Wt 100 lb 3.2 oz (45.5 kg)   BMI 18.33 kg/m²     PHYSICAL EXAM    General Appearance: alert and oriented to person, place and time, well-developed and well-nourished, in no acute distress  Head: normocephalic and atraumatic  Neck: neck supple and non tender without mass, no thyromegaly or thyroid nodules, no cervical lymphadenopathy   Abdomen: soft, non-tender, non-distended, normal bowel sounds, no masses or organomegaly      Assessment:     Patient Active Problem List   Diagnosis    DVT prophylaxis    Pulmonary emboli ([address]    Long term (current) use of anticoagulants    Sacral decubitus ulcer, stage IV (HCC)    Left ischial pressure sore, stage IV ([address]       Wound 05/03/22 Sacrum #2 (Active)   Wound Image   08/23/22 0930   Wound Etiology Pressure Stage 3 11/29/22 0950   Wound Cleansed Cleansed with saline 11/29/22 0950   Dressing/Treatment Alginate with Ag;Dry dressing 11/08/22 1156   Wound Length (cm) 1.2 cm 12/13/22 1034   Wound Width (cm) 2 cm 12/13/22 1034   Wound Depth (cm) 2 cm 12/13/22 1034   Wound Surface Area (cm^2) 2.4 cm^2 12/13/22 1034   Change in Wound Size % (l*w) -101.68 12/13/22 1034   Wound Volume (cm^3) 4.8 cm^3 12/13/22 1034   Wound Healing % -908 12/13/22 1034   Post-Procedure Length (cm) 1 cm 11/01/22 1024   Post-Procedure Width (cm) 1 cm 11/01/22 1024   Post-Procedure Depth (cm) 0.3 cm 11/01/22 1024   Post-Procedure Surface Area (cm^2) 1 cm^2 11/01/22 1024   Post-Procedure Volume (cm^3) 0.3 cm^3 11/01/22 1024   Wound Assessment Exposed structure bone;Granulation tissue 12/13/22 1034   Drainage Amount Moderate 12/13/22 1034   Drainage Description Serosanguinous; Yellow 12/13/22 1034   Odor None 12/13/22 1034   Merlyn-wound Assessment Intact 12/13/22 1034   Margins Attached edges 11/29/22 0950   Wound Thickness Description not for Pressure Injury Full thickness 11/29/22 0950   Number of days: 224       Wound 05/03/22 Ischium Left #3 (Active)   Wound Image   08/23/22 0930   Wound Etiology Pressure Stage 4 12/13/22 1034   Wound Cleansed Cleansed with saline 12/13/22 1034   Dressing/Treatment Moist to dry 11/08/22 1156   Wound Length (cm) 2 cm 12/13/22 1034   Wound Width (cm) 6.8 cm 12/13/22 1034   Wound Depth (cm) 3.5 cm 12/13/22 1034   Wound Surface Area (cm^2) 13.6 cm^2 12/13/22 1034   Change in Wound Size % (l*w) 66.25 12/13/22 1034   Wound Volume (cm^3) 47.6 cm^3 12/13/22 1034   Wound Healing % -195 12/13/22 1034   Post-Procedure Length (cm) 1 cm 11/01/22 1024   Post-Procedure Width (cm) 2 cm 11/01/22 1024   Post-Procedure Depth (cm) 0.3 cm 11/01/22 1024   Post-Procedure Surface Area (cm^2) 2 cm^2 11/01/22 1024   Post-Procedure Volume (cm^3) 0.6 cm^3 11/01/22 1024   Distance Tunneling (cm) 5 cm 12/13/22 1034   Tunneling Position ___ O'Clock 1500 12/13/22 1034   Undermining Starts ___ O'Clock 0600 12/13/22 1034   Undermining Ends___ O'Clock 1200 12/13/22 1034   Undermining Maxium Distance (cm) 5 12/13/22 1034   Wound Assessment Granulation tissue 12/13/22 1034   Drainage Amount Moderate 12/13/22 1034   Drainage Description Serosanguinous 12/13/22 1034   Odor None 12/13/22 1034   Merlyn-wound Assessment Intact 12/13/22 1034   Margins Defined edges 12/13/22 1034   Wound Thickness Description not for Pressure Injury Full thickness 11/29/22 0950   Number of days: 65     46-year-old female with multiple sclerosis who is seen in follow-up for a stage IV sacral ulceration and a stage IV left ischial ulceration. She is status post surgical debridement last week. Both ulcers look  today and are healthy appearing. We strongly recommended a wound VAC. The patient is resistant to this idea. Plan:     Continue normal saline wet-to-dry dressing changes daily. Continue antipressure measurements and dietary supplements. Follow-up in 2 weeks.     Treatment Plan          Written Patient Discharge Instructions Given            Electronically signed by Aaron Cox MD on 12/13/2022 at 11:01 AM

## 2022-12-16 ENCOUNTER — APPOINTMENT (OUTPATIENT)
Dept: PHARMACY | Age: 42
End: 2022-12-16
Payer: MEDICAID

## 2022-12-16 ENCOUNTER — ANTI-COAG VISIT (OUTPATIENT)
Dept: PHARMACY | Age: 42
End: 2022-12-16
Payer: MEDICAID

## 2022-12-16 DIAGNOSIS — Z29.9 DVT PROPHYLAXIS: Primary | ICD-10-CM

## 2022-12-16 DIAGNOSIS — I26.09 PULMONARY EMBOLISM WITH ACUTE COR PULMONALE, UNSPECIFIED CHRONICITY, UNSPECIFIED PULMONARY EMBOLISM TYPE (HCC): ICD-10-CM

## 2022-12-16 DIAGNOSIS — Z79.01 LONG TERM (CURRENT) USE OF ANTICOAGULANTS: ICD-10-CM

## 2022-12-16 LAB — INTERNATIONAL NORMALIZATION RATIO, POC: 1.7

## 2022-12-16 PROCEDURE — 85610 PROTHROMBIN TIME: CPT

## 2022-12-16 PROCEDURE — 99212 OFFICE O/P EST SF 10 MIN: CPT

## 2022-12-16 NOTE — PROGRESS NOTES
Ms. Salo Krishna is a 38 y.o. y/o female with history of DVT, PE in about 2010. She presents today for anticoagulation monitoring and adjustment. Pertinent PMH: DVT assoc with PICC line  Hx of multiple sclerosis,     Patient Reported Findings:  Yes     No  []   [x]       Patient verifies current dosing regimen as listed---> does not know dose, but takes what RN gives her but then states that she might have taken 5 mg daily but took old dose. Does not know dose, uses AVS but AVS ran out last week so she is unsure after that --> states that aide followed avs for doses --> unable to verify --> unable to state dose---> States did everything she was supposed to do  []   [x]       S/S bleeding/bruising/swelling/SOB -denies   []   [x]       Blood in urine or stool - denies   [x]   []       Procedures scheduled in the future at this time  ---> having wound procedure on 12/6, needs to hold warfarin for 5 days and ridge with lovenox. Also having botox inj, unsure if needs to hold warfarin (last year when had botox inj held 5 days and bridged with lovenox). Pt states she has an appt today to discuss procedure since having wound procedure week prior --> pt unsure when she resumed warfarin after procedure. Called and spoke to prasanth HEWITT, she believes pt resumed warfarin on 12/6 and does not see any missing doses. Botox procedure r/s for 1/25/23   [x]   []       Missed Dose -thinks missed at least 2 doses --> thinks missed one dose within last week but unable to state what day.---> denies    []   [x]       Extra Dose - denies  [x]   []       Change in medications-using perc and tylenol prn for pain with new braces- states this will continue---> takes tylenol prn ---> no more tylenol, no other changes in meds --> unsure if patient is still taking cipro, new multivit and will restart biotin soon. ---> cipro then levo and doxy, states has 1-2 days left --> on macrobid recently --> started MVI flinstones (contain 60 mcg of vit k). No abx ---> no changes   [x]   []       Change in health/diet/appetite Normal vegetable is about twice a week and generally a low vitamin K. When she has broccoli it would be in a stir truong. May have a salad only once a month.---> had vegetables, no significant vit k ---> had greens twice since last visit. States that she had done a detox with tea (haven and turmeric tea)  had greens 2-3 times in the past week, no NVD --> had salad, greens and cabbage this week --> unsure if she had any vit k---> had vit k multiple times in the past week --> had salad yesterday ---> some green beans and zucchini   []   [x]       Change in alcohol use adamant that she did not drink alcohol while in [place] --> states now that she had one glass of alcohol in [facility]. none recently --> denies---> had a large mixed drink in last week---> denies    []   [x]       Change in activity  []   [x]       Hospital admission-   []   [x]       Emergency department visit- 10/26- ER, cipro, INR 2.0      11/21- for back pain. INR 1, was given 10 mg of warfarin that day prior to leaving. Was prescribed macrobid x 7 d.   []   [x]       Other complaints   Clinical Outcomes:  Yes     No  []   [x]       Major bleeding event  []   [x]       Thromboembolic event    Duration of warfarin therapy: Indefinitely  INR Range:  2.0-3.0     Continues to miss apts and miss doses. Pt rarely offers up any information to determine cause of INR fluctuations. lT Gould, RN fills [employer]. Will need to call and provide instructions after apts [phone]. INR is 1.7 today after boosting to get patient in range/off lovenox. Patient continues to be subtherapeutic. Called and spoke to Tl Gould. Explained INR finally trending up, she said she has gone over each night to give patient her medication so she can confirm she has gotten it since Tues with the shots.  She will try to go over tonight as well but will not be there over weekend, so will give boosted dose tonight again in case patient misses doses over weekend. Take 5mg on Fri and 3.75 mg all other days of the week. Continue lovenox injections twice daily until therapeutic. Encouraged to maintain a consistency of vegetables/salads.    Recheck INR on    Also scheduled for  since has wound care that day and schedule filling     **consent form signed 10/27/2021    Referring PCP is Dr. Tristan Martinez results  INR (no units)   Date Value   2022 0.95   2022 5.12 (HH)   10/05/2020 8.93 (HH)   2020 3.22 (H)     INR,(POC) (no units)   Date Value   2022 1.7   2022 1.1   2022 1.1   2022 1.2     For Pharmacy Admin Tracking Only    Intervention Detail: Adherence Monitorin and Dose Adjustment: 1, reason: Therapy Optimization  Total # of Interventions Recommended: 2  Total # of Interventions Accepted: 2  Time Spent (min): 20

## 2022-12-21 NOTE — DISCHARGE INSTRUCTIONS
[address], [address] [facility] [address]  Telephone: [phone]     Discharge Instructions     Important reminders:     **If you have any signs and symptoms of illness (Cough, fever, congestion, nausea, vomiting, diarrhea, etc.) please call the wound care center prior to your appointment. 1. Increase Protein intake for optimal wound healing  2. No added salt to reduce any swelling  3. If diabetic, maintain good glucose control  4. If you smoke, smoking prohibits wound healing, we ask that you refrain from smoking. 5. When taking antibiotics take the entire prescription as ordered. Do not stop taking until medication is all gone unless otherwise instructed. 6. Exercise as tolerated. 7. Keep weight off wounds and reposition every 2 hours if applicable. 8. If wound(s) is on your lower extremity, elevate legs to the level of the heart or above for 30 minutes 4-5 times a day and/or when sitting. Avoid standing for long periods of time. 9. Do not get wounds wet in bath or shower unless otherwise instructed by your physician. If your wound is on your foot or leg, you may purchase a cast bag. Please ask at the pharmacy. If Vascular testing is ordered, please call [address] [place] [address] [phone]) to schedule. Vascular tests ordered by Wound Care Physicians may take up to 2 hours to complete. Please keep that in mind when scheduling. If Vascular testing is scheduled, please bring supplies to replace your dressing after testing is done. The vascular department does not stock supplies. Wound: Sacrum, Left ischium     With each dressing change, rinse wounds with 0.9% Saline. (May use wound wash or soft contact solution. Both can be purchased at a local drug store). If unable to obtain saline, may use a gentle soap and water.      Dressing care: Left ischium- moist to dry with roll gauze (use only one piece to pack all around wound & leave tail to be able to may receive a survey from a company called [employer] asking for your feedback. We would appreciate it if you took a few minutes to share your experience.   Your input is very valuable to us

## 2022-12-27 ENCOUNTER — HOSPITAL ENCOUNTER (OUTPATIENT)
Dept: WOUND CARE | Age: 42
Discharge: HOME | End: 2022-12-27
Payer: MEDICAID

## 2022-12-27 ENCOUNTER — ANTI-COAG VISIT (OUTPATIENT)
Dept: PHARMACY | Age: 42
End: 2022-12-27
Payer: MEDICAID

## 2022-12-27 VITALS
HEART RATE: 112 BPM | TEMPERATURE: 97 F | DIASTOLIC BLOOD PRESSURE: 61 MMHG | RESPIRATION RATE: 16 BRPM | SYSTOLIC BLOOD PRESSURE: 86 MMHG

## 2022-12-27 DIAGNOSIS — Z79.01 LONG TERM (CURRENT) USE OF ANTICOAGULANTS: ICD-10-CM

## 2022-12-27 DIAGNOSIS — I26.09 PULMONARY EMBOLISM WITH ACUTE COR PULMONALE, UNSPECIFIED CHRONICITY, UNSPECIFIED PULMONARY EMBOLISM TYPE (HCC): ICD-10-CM

## 2022-12-27 DIAGNOSIS — L89.154 SACRAL DECUBITUS ULCER, STAGE IV (HCC): Primary | ICD-10-CM

## 2022-12-27 DIAGNOSIS — Z29.9 DVT PROPHYLAXIS: Primary | ICD-10-CM

## 2022-12-27 LAB — INTERNATIONAL NORMALIZATION RATIO, POC: 1.2

## 2022-12-27 PROCEDURE — 85610 PROTHROMBIN TIME: CPT

## 2022-12-27 PROCEDURE — 99213 OFFICE O/P EST LOW 20 MIN: CPT | Performed by: SURGERY

## 2022-12-27 PROCEDURE — 99214 OFFICE O/P EST MOD 30 MIN: CPT

## 2022-12-27 PROCEDURE — 99212 OFFICE O/P EST SF 10 MIN: CPT

## 2022-12-27 RX ORDER — LIDOCAINE 50 MG/G
OINTMENT TOPICAL ONCE
OUTPATIENT
Start: 2022-12-27 | End: 2022-12-27

## 2022-12-27 RX ORDER — LIDOCAINE HYDROCHLORIDE 20 MG/ML
JELLY TOPICAL ONCE
OUTPATIENT
Start: 2022-12-27 | End: 2022-12-27

## 2022-12-27 RX ORDER — BACITRACIN ZINC AND POLYMYXIN B SULFATE 500; 1000 [USP'U]/G; [USP'U]/G
OINTMENT TOPICAL ONCE
OUTPATIENT
Start: 2022-12-27 | End: 2022-12-27

## 2022-12-27 RX ORDER — LIDOCAINE 40 MG/G
CREAM TOPICAL ONCE
OUTPATIENT
Start: 2022-12-27 | End: 2022-12-27

## 2022-12-27 RX ORDER — BACITRACIN, NEOMYCIN, POLYMYXIN B 400; 3.5; 5 [USP'U]/G; MG/G; [USP'U]/G
OINTMENT TOPICAL ONCE
OUTPATIENT
Start: 2022-12-27 | End: 2022-12-27

## 2022-12-27 RX ORDER — LIDOCAINE HYDROCHLORIDE 40 MG/ML
SOLUTION TOPICAL ONCE
OUTPATIENT
Start: 2022-12-27 | End: 2022-12-27

## 2022-12-27 RX ORDER — GINSENG 100 MG
CAPSULE ORAL ONCE
OUTPATIENT
Start: 2022-12-27 | End: 2022-12-27

## 2022-12-27 RX ORDER — BETAMETHASONE DIPROPIONATE 0.05 %
OINTMENT (GRAM) TOPICAL ONCE
OUTPATIENT
Start: 2022-12-27 | End: 2022-12-27

## 2022-12-27 RX ORDER — CLOBETASOL PROPIONATE 0.5 MG/G
OINTMENT TOPICAL ONCE
OUTPATIENT
Start: 2022-12-27 | End: 2022-12-27

## 2022-12-27 RX ORDER — LIDOCAINE 40 MG/G
CREAM TOPICAL ONCE
Status: DISCONTINUED | OUTPATIENT
Start: 2022-12-27 | End: 2022-12-28 | Stop reason: HOSPADM

## 2022-12-27 RX ORDER — GENTAMICIN SULFATE 1 MG/G
OINTMENT TOPICAL ONCE
OUTPATIENT
Start: 2022-12-27 | End: 2022-12-27

## 2022-12-27 ASSESSMENT — PAIN - FUNCTIONAL ASSESSMENT: PAIN_FUNCTIONAL_ASSESSMENT: ACTIVITIES ARE NOT PREVENTED

## 2022-12-27 ASSESSMENT — PAIN DESCRIPTION - LOCATION: LOCATION: LEG;BUTTOCKS

## 2022-12-27 ASSESSMENT — PAIN SCALES - GENERAL: PAINLEVEL_OUTOF10: 7

## 2022-12-27 ASSESSMENT — PAIN DESCRIPTION - PAIN TYPE: TYPE: SURGICAL PAIN

## 2022-12-27 ASSESSMENT — PAIN DESCRIPTION - FREQUENCY: FREQUENCY: INTERMITTENT

## 2022-12-27 ASSESSMENT — PAIN DESCRIPTION - DESCRIPTORS: DESCRIPTORS: ACHING

## 2022-12-27 ASSESSMENT — PAIN DESCRIPTION - ORIENTATION: ORIENTATION: LEFT

## 2022-12-27 NOTE — PROGRESS NOTES
started MVI flinstones (contain 60 mcg of vit k). No abx ---> no changes ---> nitrofurantoin   [x]   []       Change in health/diet/appetite Normal vegetable is about twice a week and generally a low vitamin K. When she has broccoli it would be in a stir truong. May have a salad only once a month.---> had vegetables, no significant vit k ---> had greens twice since last visit. States that she had done a detox with tea (haven and turmeric tea)  had greens 2-3 times in the past week, no NVD --> had salad, greens and cabbage this week --> unsure if she had any vit k---> had vit k multiple times in the past week --> had salad yesterday ---> some green beans and zucchini---> eating more greens, no boost/ensure/V8/liver, no NVD   []   [x]       Change in alcohol use adamant that she did not drink alcohol while in [place] --> states now that she had one glass of alcohol in [facility]. none recently --> denies---> had a large mixed drink in last week---> denies    []   [x]       Change in activity  []   [x]       Hospital admission-   []   [x]       Emergency department visit- 10/26- ER, cipro, INR 2.0      11/21- for back pain. INR 1, was given 10 mg of warfarin that day prior to leaving. Was prescribed macrobid x 7 d.   []   [x]       Other complaints   Clinical Outcomes:  Yes     No  []   [x]       Major bleeding event  []   [x]       Thromboembolic event    Duration of warfarin therapy: Indefinitely  INR Range:  2.0-3.0     Continues to miss apts and miss doses. Pt rarely offers up any information to determine cause of INR fluctuations. Tl Gould RN fills [facility]. Will need to call and provide instructions after apts [phone]. INR is 1.2 today after missing doses, missing apt last week, and eating more greens. Patient continues to be subtherapeutic. Called and LVM for Cynthia. Take 5mg for 2 days then increase dose to 5mg Mon and Fri and 3.75mg all other days. Continue lovenox injections twice daily until therapeutic. Encouraged to maintain a consistency of vegetables/salads.    Recheck INR on     **consent form signed 10/27/2021    Referring PCP is Dr. Minna Felty results  INR (no units)   Date Value   2022 0.95   2022 5.12 (HH)   10/05/2020 8.93 (HH)   2020 3.22 (H)     INR,(POC) (no units)   Date Value   2022 1.2   2022 1.7   2022 1.1   2022 1.1     For Pharmacy Admin Tracking Only    Intervention Detail: Adherence Monitorin and Dose Adjustment: 1, reason: Therapy Optimization  Total # of Interventions Recommended: 2  Total # of Interventions Accepted: 2  Time Spent (min): 20

## 2022-12-27 NOTE — LETTER
WORK RELEASE  [facility]OUND [facility]  [address] [place] [address]  [phone]  Dept: [address] RECORD NUMBER: [id number]   AGE: 38 y.o. GENDER: female : 1980   TODAYS DATE: 2022       Ms. Kidd was seen in the [address] [place] [address] Road on 2022. She is coming every 2 weeks.  Today her appointment was [phone]    Electronically signed by Bandar Oliver RN on 2022 at 11:31 AM    Sincerely,      [address] [place] [address] Pkwy and [facility]

## 2022-12-27 NOTE — PROGRESS NOTES
[address] [facility]:      [address] f: [phone] f: [phone] p: [phone] [email or web address]     Ordering Center: [address]  [facility] [address]  [phone]  Dept: [phone]   Fax# [phone]    Patient Information:      [address] [facility] [address] [place] [address]   [phone]   : 1980  AGE: 38 y.o. GENDER: female   TODAYS DATE:  2022    Insurance:      PRIMARY INSURANCE:  Plan: MEDICAID [facility] DEPT OF JOB  Coverage: [place]  Effective Date: 2015  Group Number: [unfilled]  Subscriber Number: [id number] - (Medicaid)    Payer/Plan Subscr  Sex Relation Sub. Ins. ID Effective Group Num   1.  [place] -* DEMARCO SCHWARZ 1980 Female Self [id number] 1/1/15                                    [address]         Patient Wound Information:     Additional ICD-10 Codes:     Patient Active Problem List   Diagnosis Code    DVT prophylaxis Z29.9    Pulmonary emboli ([address] I26.99    Long term (current) use of anticoagulants Z79.01    Sacral decubitus ulcer, stage IV (HCC) L89.154    Left ischial pressure sore, stage IV ([address] L89.324       WOUNDS REQUIRING DRESSING SUPPLIES:     Wound 22 Sacrum #2 (Active)   Wound Image   22 0930   Wound Etiology Pressure Stage 3 22 1056   Wound Cleansed Cleansed with saline 22 1056   Dressing/Treatment Alginate with Ag;Dry dressing 22 1156   Wound Length (cm) 1.1 cm 22 1056   Wound Width (cm) 1.2 cm 22 1056   Wound Depth (cm) 2 cm 22 1056   Wound Surface Area (cm^2) 1.32 cm^2 22 1056   Change in Wound Size % (l*w) -10.92 22 1056   Wound Volume (cm^3) 2.64 cm^3 22 1056   Wound Healing % -455 22 1056   Post-Procedure Length (cm) 1 cm 22 1024   Post-Procedure Width (cm) 1 cm 22 1024   Post-Procedure Depth (cm) 0.3 cm 22 1024 Post-Procedure Surface Area (cm^2) 1 cm^2 11/01/22 1024   Post-Procedure Volume (cm^3) 0.3 cm^3 11/01/22 1024   Wound Assessment Granulation tissue 12/27/22 1056   Drainage Amount Moderate 12/27/22 1056   Drainage Description Serosanguinous 12/27/22 1056   Odor None 12/27/22 1056   Merlyn-wound Assessment Intact; Maceration 12/27/22 1056   Margins Attached edges 11/29/22 0950   Wound Thickness Description not for Pressure Injury Full thickness 11/29/22 0950   Number of days: 238       Wound 05/03/22 Ischium Left #3 (Active)   Wound Image   08/23/22 0930   Wound Etiology Pressure Stage 4 12/27/22 1056   Wound Cleansed Cleansed with saline 12/27/22 1056   Dressing/Treatment Moist to dry 11/08/22 1156   Wound Length (cm) 2 cm 12/27/22 1056   Wound Width (cm) 5.8 cm 12/27/22 1056   Wound Depth (cm) 3 cm 12/27/22 1056   Wound Surface Area (cm^2) 11.6 cm^2 12/27/22 1056   Change in Wound Size % (l*w) 71.22 12/27/22 1056   Wound Volume (cm^3) 34.8 cm^3 12/27/22 1056   Wound Healing % -116 12/27/22 1056   Post-Procedure Length (cm) 1 cm 11/01/22 1024   Post-Procedure Width (cm) 2 cm 11/01/22 1024   Post-Procedure Depth (cm) 0.3 cm 11/01/22 1024   Post-Procedure Surface Area (cm^2) 2 cm^2 11/01/22 1024   Post-Procedure Volume (cm^3) 0.6 cm^3 11/01/22 1024   Distance Tunneling (cm) 5.8 cm 12/27/22 1056   Tunneling Position ___ O'Clock 1400 12/27/22 1056   Undermining Starts ___ O'Clock 0500 12/27/22 1056   Undermining Ends___ O'Clock 1200 12/27/22 1056   Undermining Maxium Distance (cm) 5.8 12/27/22 1056   Wound Assessment Granulation tissue 12/27/22 1056   Drainage Amount Moderate 12/27/22 1056   Drainage Description Serosanguinous 12/27/22 1056   Odor None 12/27/22 1056   Merlyn-wound Assessment Intact 12/27/22 1056   Margins Defined edges 12/27/22 1056   Wound Thickness Description not for Pressure Injury Full thickness 11/29/22 0950   Number of days: 238          Supplies Requested :      DISPENSE AS WRITTEN    WOUND #: 1 and 2   PRIMARY DRESSING:    None   Cover and Secure with: 4X4 non woven gauze pad  ABD pad     FREQUENCY OF DRESSING CHANGES:  Daily    Wound Thickness [x] Full   []Partial       Patient Wound(s) Debrided: [x] Yes   [] No    Debridement Date: 11/1/2022    Debribement Type: Excisional/Sharp    ADDITIONAL ITEMS:  [] Gloves Small  [x] Gloves Medium [] Gloves Large [] Gloves XLarge [] Paper Tape 2\" [] Paper Tape 3\"  [] Medipore Tape 3\" [x] Medipore Tape 4\"    [] Hypofix skin sensitive tape 2\"  [] Hypofix skin sensitive tape 4\"  [x] Saline  [] Skin Prep   [] Adhesive Remover   [x] Cotton Tip Applicators  [] Tubular Stocking   [] Size E  [] Size G  [] Other:    Patient currently being seen by Home Health: [] Yes   [x] No    Quantity of days dispensed:  []15  [x]30  []60  []90 Days    Order valid for 90 days    Provider Information:      PROVIDER'S NAME/NPI  Gallito Dotson MD NPI: [id number]   I give permission to coordinate the care for this patient

## 2022-12-27 NOTE — PROGRESS NOTES
Jocelyne Lui  Progress Note       Corey Savage  AGE: 38 y.o. GENDER: female  : 1980  TODAY'S DATE:  2022    Subjective:     Chief Complaint   Patient presents with    Wound Check     Sacrum and left ischium         HISTORY of PRESENT ILLNESS HPI     Corey Savage is a 38 y.o. female who presents today for wound evaluation. History of Wound: Sacral and ischial ulcer    Wound Pain:  mild  Severity:  2 / 10   Wound Type:  pressure  Modifying Factors:  chronic pressure and decreased mobility  Associated Signs/Symptoms:  none        PAST MEDICAL HISTORY        Diagnosis Date    Anemia     DVT (deep venous thrombosis) (formerly [facility])     MRSA (methicillin resistant staph aureus) culture positive 2017    urine    MS (multiple sclerosis) ([facility]     Pulmonary embolism (formerly [facility])     Sacral decubitus ulcer, stage IV ([facility] 2022       PAST SURGICAL HISTORY    Past Surgical History:   Procedure Laterality Date    FINGER AMPUTATION      LEG SURGERY Left 2022    WOUND DEBRIDEMENT OF LEFT ISCHIAL ULCER performed by Lexy Saez MD at [facility] N/A 2022    WOUND DEBRIDEMENT OF SACRAL DECUBITUS performed by Lexy Saez MD at [facility]    History reviewed. No pertinent family history.     SOCIAL HISTORY    Social History     Tobacco Use    Smoking status: Never    Smokeless tobacco: Never   Vaping Use    Vaping Use: Never used   Substance Use Topics    Alcohol use: Not Currently     Comment: socially    Drug use: No       ALLERGIES    Allergies   Allergen Reactions    Ambien [Zolpidem Tartrate]     Ampicillin     Bactrim     Morphine     Risperidone And Related     Vicodin [Hydrocodone-Acetaminophen]     Zoloft        MEDICATIONS    Current Outpatient Medications on File Prior to Encounter   Medication Sig Dispense Refill    oxybutynin (DITROPAN) 5 MG tablet Take 5 mg by mouth 3 times daily      nitrofurantoin, macrocrystal-monohydrate, (MACROBID) 100 MG capsule Take 100 mg by mouth 2 times daily      alendronate (FOSAMAX) 70 MG tablet Take 70 mg by mouth every 7 days      MYRBETRIQ 50 MG TB24       pantoprazole (PROTONIX) 40 MG tablet Take 1 tablet by mouth daily      montelukast (SINGULAIR) 10 MG tablet Take 10 mg by mouth nightly      magnesium oxide (MAG-OX) 400 MG tablet Take 400 mg by mouth every evening      vitamin D3 (CHOLECALCIFEROL) 125 MCG (5000 UT) TABS tablet Take 1 tablet by mouth daily (Patient not taking: Reported on 12/2/2022)      simethicone (MYLICON) 80 MG chewable tablet Take 80 mg by mouth daily as needed for Flatulence      warfarin (COUMADIN) 2.5 MG tablet Take 2.5 mg by mouth See Admin Instructions Unsure of dose sequence      solifenacin (VESICARE) 5 MG tablet Take 5 mg by mouth daily (Patient not taking: Reported on 12/2/2022)      trospium (SANCTURA) 20 MG tablet Take 20 mg by mouth 2 times daily (Patient not taking: Reported on 12/2/2022)      cetirizine (ZYRTEC) 10 MG tablet Take 10 mg by mouth daily. oxyCODONE-acetaminophen (PERCOCET) 5-325 MG per tablet Take 1 tablet by mouth every 6 hours as needed. baclofen (LIORESAL) 10 MG tablet Take 10 mg by mouth 3 times daily. calcium-vitamin D (OSCAL-500) 500-200 MG-UNIT per tablet Take 1 tablet by mouth daily. ferrous sulfate 325 (65 FE) MG tablet Take 325 mg by mouth daily (with breakfast). potassium chloride SA (K-DUR;KLOR-CON) 20 MEQ tablet Take 20 mEq by mouth 2 times daily      therapeutic multivitamin-minerals (THERAGRAN-M) tablet Take 1 tablet by mouth daily. No current facility-administered medications on file prior to encounter.        REVIEW OF SYSTEMS    Constitutional: negative  Eyes: negative  Ears, nose, mouth, throat, and face: negative  Gastrointestinal: negative  Behavioral/Psych: negative      Objective:      BP 86/61   Pulse (!) 112   Temp 97 °F (36.1 °C) (Infrared)   Resp 16     PHYSICAL EXAM    General Appearance: alert and oriented to person, place and time, well-developed and well-nourished, in no acute distress  Head: normocephalic and atraumatic  Neck: neck supple and non tender without mass, no thyromegaly or thyroid nodules, no cervical lymphadenopathy   Abdomen: soft, non-tender, non-distended, normal bowel sounds, no masses or organomegaly      Assessment:     Patient Active Problem List   Diagnosis    DVT prophylaxis    Pulmonary emboli ([address]    Long term (current) use of anticoagulants    Sacral decubitus ulcer, stage IV (H[facility])    Left ischial pressure sore, stage IV ([address]       Wound 05/03/22 Sacrum #2 (Active)   Wound Image   08/23/22 0930   Wound Etiology Pressure Stage 3 12/27/22 1056   Wound Cleansed Cleansed with saline 12/27/22 1056   Dressing/Treatment Alginate with Ag;Dry dressing 11/08/22 1156   Wound Length (cm) 1.1 cm 12/27/22 1056   Wound Width (cm) 1.2 cm 12/27/22 1056   Wound Depth (cm) 2 cm 12/27/22 1056   Wound Surface Area (cm^2) 1.32 cm^2 12/27/22 1056   Change in Wound Size % (l*w) -10.92 12/27/22 1056   Wound Volume (cm^3) 2.64 cm^3 12/27/22 1056   Wound Healing % -455 12/27/22 1056   Post-Procedure Length (cm) 1 cm 11/01/22 1024   Post-Procedure Width (cm) 1 cm 11/01/22 1024   Post-Procedure Depth (cm) 0.3 cm 11/01/22 1024   Post-Procedure Surface Area (cm^2) 1 cm^2 11/01/22 1024   Post-Procedure Volume (cm^3) 0.3 cm^3 11/01/22 1024   Wound Assessment Granulation tissue 12/27/22 1056   Drainage Amount Moderate 12/27/22 1056   Drainage Description Serosanguinous 12/27/22 1056   Odor None 12/27/22 1056   Merlyn-wound Assessment Intact; Maceration 12/27/22 1056   Margins Attached edges 11/29/22 0950   Wound Thickness Description not for Pressure Injury Full thickness 11/29/22 0950   Number of days: 238       Wound 05/03/22 Ischium Left #3 (Active)   Wound Image   08/23/22 0930   Wound Etiology Pressure Stage 4 12/27/22 1056   Wound Cleansed Cleansed with saline 12/27/22 1056   Dressing/Treatment Moist to dry 11/08/22 1156   Wound Length (cm) 2 cm 12/27/22 1056   Wound Width (cm) 5.8 cm 12/27/22 1056   Wound Depth (cm) 3 cm 12/27/22 1056   Wound Surface Area (cm^2) 11.6 cm^2 12/27/22 1056   Change in Wound Size % (l*w) 71.22 12/27/22 1056   Wound Volume (cm^3) 34.8 cm^3 12/27/22 1056   Wound Healing % -116 12/27/22 1056   Post-Procedure Length (cm) 1 cm 11/01/22 1024   Post-Procedure Width (cm) 2 cm 11/01/22 1024   Post-Procedure Depth (cm) 0.3 cm 11/01/22 1024   Post-Procedure Surface Area (cm^2) 2 cm^2 11/01/22 1024   Post-Procedure Volume (cm^3) 0.6 cm^3 11/01/22 1024   Distance Tunneling (cm) 5.8 cm 12/27/22 1056   Tunneling Position ___ O'Clock 1400 12/27/22 1056   Undermining Starts ___ O'Clock 0500 12/27/22 1056   Undermining Ends___ O'Clock 1200 12/27/22 1056   Undermining Maxium Distance (cm) 5.8 12/27/22 1056   Wound Assessment Granulation tissue 12/27/22 1056   Drainage Amount Moderate 12/27/22 1056   Drainage Description Serosanguinous 12/27/22 1056   Odor None 12/27/22 1056   Merlyn-wound Assessment Intact 12/27/22 1056   Margins Defined edges 12/27/22 1056   Wound Thickness Description not for Pressure Injury Full thickness 11/29/22 0950   Number of days: 200     41-year-old female with multiple sclerosis who is seen in follow-up for a left ischial ulcer and a sacral ulcer. Both ulcers appear  since the surgical debridement. She is still resistant to the idea of a wound VAC. Plan:     Continue current dressing changes, dietary supplements and antipressure measurements. Follow-up in the wound center in 2 weeks.     Treatment Plan          Written Patient Discharge Instructions Given            Electronically signed by Park Tracey MD on 12/27/2022 at 11:37 AM

## 2022-12-27 NOTE — PLAN OF CARE
Discharge instructions given. Patient verbalized understanding. Return to [address] [place] [address],[address] in 2 week(s).

## 2022-12-30 ENCOUNTER — TELEPHONE (OUTPATIENT)
Dept: PHARMACY | Age: 42
End: 2022-12-30

## 2022-12-30 NOTE — TELEPHONE ENCOUNTER
Pt called to RS her missed apt this morning. Scheduled for this afternoon. Patient no-showed again for the second time today. Called patient. Line rang, no answer.      Irma Hinton, PharmD, [facility]

## 2023-01-05 ENCOUNTER — TELEPHONE (OUTPATIENT)
Dept: PHARMACY | Age: 43
End: 2023-01-05

## 2023-01-05 NOTE — TELEPHONE ENCOUNTER
Pt LVM stating it looks like we've been trying to call her home phone. States she never uses that her nephew has it . Verified her phone # is [phone].

## 2023-01-09 ENCOUNTER — ANTI-COAG VISIT (OUTPATIENT)
Dept: PHARMACY | Age: 43
End: 2023-01-09
Payer: MEDICAID

## 2023-01-09 DIAGNOSIS — Z79.01 LONG TERM (CURRENT) USE OF ANTICOAGULANTS: ICD-10-CM

## 2023-01-09 DIAGNOSIS — Z29.9 DVT PROPHYLAXIS: Primary | ICD-10-CM

## 2023-01-09 DIAGNOSIS — I26.09 OTHER ACUTE PULMONARY EMBOLISM WITH ACUTE COR PULMONALE (HCC): ICD-10-CM

## 2023-01-09 LAB — INTERNATIONAL NORMALIZATION RATIO, POC: 1.9

## 2023-01-09 PROCEDURE — 85610 PROTHROMBIN TIME: CPT

## 2023-01-09 PROCEDURE — 99212 OFFICE O/P EST SF 10 MIN: CPT

## 2023-01-09 NOTE — DISCHARGE INSTRUCTIONS
[address], [address] [place] [address]  Telephone: [phone]     Discharge Instructions     Important reminders:     **If you have any signs and symptoms of illness (Cough, fever, congestion, nausea, vomiting, diarrhea, etc.) please call the wound care center prior to your appointment. 1. Increase Protein intake for optimal wound healing  2. No added salt to reduce any swelling  3. If diabetic, maintain good glucose control  4. If you smoke, smoking prohibits wound healing, we ask that you refrain from smoking. 5. When taking antibiotics take the entire prescription as ordered. Do not stop taking until medication is all gone unless otherwise instructed. 6. Exercise as tolerated. 7. Keep weight off wounds and reposition every 2 hours if applicable. 8. If wound(s) is on your lower extremity, elevate legs to the level of the heart or above for 30 minutes 4-5 times a day and/or when sitting. Avoid standing for long periods of time. 9. Do not get wounds wet in bath or shower unless otherwise instructed by your physician. If your wound is on your foot or leg, you may purchase a cast bag. Please ask at the pharmacy. If Vascular testing is ordered, please call [address] [place] [address] ([phone]) to schedule. Vascular tests ordered by Wound Care Physicians may take up to 2 hours to complete. Please keep that in mind when scheduling. If Vascular testing is scheduled, please bring supplies to replace your dressing after testing is done. The vascular department does not stock supplies. Wound: Sacrum, Left ischium     With each dressing change, rinse wounds with 0.9% Saline. (May use wound wash or soft contact solution. Both can be purchased at a local drug store). If unable to obtain saline, may use a gentle soap and water.      Dressing care: Sacrum, Left ischium- moist to dry with roll gauze (use only one piece to pack all around wound & leave tail to be able to remove) dry dressing- change daily and as needed for drainage or soilage until NPWT arrives. once NPWT arrives start At home:Skin prep to umang-wound, drape around wound to protect good skin, bridge Left ischium and Sacrum together and bridge to hip, NPWT continuous at 120 mmHg or 125 mmHg (depending on the type of NPWT device), change Monday, Wednesday, & Friday. Canister to be changed once weekly unless full. Home care to change. If unable to place NPWT, place wet to dry, dry dressing- change daily. Increase protein, continue to drink Myron. Turn & reposition every 1-2 hours and as needed for pressure relief and comfort. Up in chair for meals only. Home Care Agency/Facility: [facility]     Your wound-care supplies will be provided by: [address] f: [phone] f: [phone] p: [phone] [email or web address]    Please note, depending on your insurance coverage, you may have out-of-pocket expenses for these supplies. Someone from the company should call you to confirm your order and discuss those potential costs before they ship your products -- please anticipate that call. If your out-of-pocket cost could be substantial, Many companies have financial hardship programs for patients who qualify, so please ask about that if you might need a hand. If you have any questions about your supplies or your potential out-of-pocket costs, or if you need to place an order for a refill of supplies (typically monthly), please call the company directly. Your  is Bethany Mendez     Follow up with Dr Surya Mcghee In 2 week(s) in the wound care center.         Wound Care Center Information: Should you experience any significant changes in your wound(s) or have questions about your wound care, please contact the "[employer] at [phone] Monday  - Thursday 8:00 am - 4:00 pm and Friday 8:00 am - 1:00pm. If you need help with your wound outside these hours and cannot wait until we are again available, contact your PCP or go to the hospital emergency room. PLEASE NOTE: IF YOU ARE UNABLE TO OBTAIN WOUND SUPPLIES, CONTINUE TO USE THE SUPPLIES YOU HAVE AVAILABLE UNTIL YOU ARE ABLE TO REACH US. IT IS MOST IMPORTANT TO KEEP THE WOUND COVERED AT ALL TIMES. Patient Experience     Thank you for trusting us with your care. You may receive a survey from a company called [employer] asking for your feedback. We would appreciate it if you took a few minutes to share your experience.   Your input is very valuable to us

## 2023-01-09 NOTE — PROGRESS NOTES
Ms. Denys Guthrie is a 38 y.o. y/o female with history of DVT, PE in about 2010. She presents today for anticoagulation monitoring and adjustment. Pertinent PMH: DVT assoc with PICC line  Hx of multiple sclerosis,     Patient Reported Findings:  Yes     No  []   [x]       Patient verifies current dosing regimen as listed---> does not know dose, but takes what RN gives her but then states that she might have taken 5 mg daily but took old dose. Does not know dose, uses AVS but AVS ran out last week so she is unsure after that --> states that aide followed avs for doses --> unable to verify --> unable to state dose---> States did everything she was supposed to do  []   [x]       S/S bleeding/bruising/swelling/SOB -denies   []   [x]       Blood in urine or stool - denies   [x]   []       Procedures scheduled in the future at this time  ---> having wound procedure on 12/6, needs to hold warfarin for 5 days and ridge with lovenox. Also having botox inj, unsure if needs to hold warfarin (last year when had botox inj held 5 days and bridged with lovenox). Pt states she has an appt today to discuss procedure since having wound procedure week prior --> pt unsure when she resumed warfarin after procedure. Called and spoke to prasanth HEWITT, she believes pt resumed warfarin on 12/6 and does not see any missing doses. Botox procedure r/s for 1/25/23   []   [x]       Missed Dose -thinks missed at least 2 doses --> thinks missed one dose within last week but unable to state what day.---> denies ---> 12/23 for sure, not sure what other days --> no missed doses    []   [x]       Extra Dose - denies  []   [x]       Change in medications-using perc and tylenol prn for pain with new braces- states this will continue---> takes tylenol prn ---> no more tylenol, no other changes in meds --> unsure if patient is still taking cipro, new multivit and will restart biotin soon. ---> cipro then levo and doxy, states has 1-2 days left --> on macrobid recently --> started MVI flinstones (contain 60 mcg of vit k). No abx ---> no changes   [x]   []       Change in health/diet/appetite Normal vegetable is about twice a week and generally a low vitamin K. When she has broccoli it would be in a stir truong. May have a salad only once a month.---> had vegetables, no significant vit k ---> had greens twice since last visit. States that she had done a detox with tea (haven and turmeric tea)  had greens 2-3 times in the past week, no NVD --> had salad, greens and cabbage this week --> unsure if she had any vit k---> had vit k multiple times in the past week --> had salad yesterday ---> some green beans and zucchini---> eating more greens, no boost/ensure/V8/liver, no NVD -->   []   [x]       Change in alcohol use adamant that she did not drink alcohol while in [place] --> states now that she had one glass of alcohol in [facility]. none recently --> denies---> had a large mixed drink in last week---> denies    []   [x]       Change in activity  []   [x]       Hospital admission-   []   [x]       Emergency department visit- 10/26- ER, cipro, INR 2.0      11/21- for back pain. INR 1, was given 10 mg of warfarin that day prior to leaving. Was prescribed macrobid x 7 d.   []   [x]       Other complaints   Clinical Outcomes:  Yes     No  []   [x]       Major bleeding event  []   [x]       Thromboembolic event    Duration of warfarin therapy: Indefinitely  INR Range:  2.0-3.0     Continues to miss apts and miss doses. Pt rarely offers up any information to determine cause of INR fluctuations. Nicanor Enriquez RN fills [facility]. Will need to call and provide instructions after apts [phone]. INR is 1.9 today   Called and LVM for Nicanor Enriquez with dosing instructions. Increase dose to 5mg Mon Wed and Fri and 3.75mg all other days   Continue lovenox injections twice daily until therapeutic. Encouraged to maintain a consistency of vegetables/salads.    Recheck INR on Mon 1/16 d/t pt inability to RTC prior     **consent form signed 1/9/23    Referring PCP is Dr. Monik Sena results  INR (no units)   Date Value   12/06/2022 0.95   06/17/2022 5.12 (HH)   10/05/2020 8.93 (HH)   09/19/2020 3.22 (H)     INR,(POC) (no units)   Date Value   12/27/2022 1.2   12/16/2022 1.7   12/13/2022 1.1   11/29/2022 1.1     For Pharmacy Admin Tracking Only    Intervention Detail: Dose Adjustment: 1, reason: Therapy Optimization  Total # of Interventions Recommended: 1  Total # of Interventions Accepted: 1  Time Spent (min): 15

## 2023-01-10 ENCOUNTER — HOSPITAL ENCOUNTER (OUTPATIENT)
Dept: WOUND CARE | Age: 43
Discharge: HOME | End: 2023-01-10
Payer: MEDICAID

## 2023-01-10 VITALS — WEIGHT: 98.5 LBS | BODY MASS INDEX: 18.02 KG/M2

## 2023-01-10 DIAGNOSIS — L89.154 SACRAL DECUBITUS ULCER, STAGE IV (HCC): Primary | ICD-10-CM

## 2023-01-10 PROCEDURE — 99214 OFFICE O/P EST MOD 30 MIN: CPT

## 2023-01-10 PROCEDURE — 99213 OFFICE O/P EST LOW 20 MIN: CPT | Performed by: SURGERY

## 2023-01-10 RX ORDER — BETAMETHASONE DIPROPIONATE 0.05 %
OINTMENT (GRAM) TOPICAL ONCE
OUTPATIENT
Start: 2023-01-10 | End: 2023-01-10

## 2023-01-10 RX ORDER — BACITRACIN, NEOMYCIN, POLYMYXIN B 400; 3.5; 5 [USP'U]/G; MG/G; [USP'U]/G
OINTMENT TOPICAL ONCE
OUTPATIENT
Start: 2023-01-10 | End: 2023-01-10

## 2023-01-10 RX ORDER — GENTAMICIN SULFATE 1 MG/G
OINTMENT TOPICAL ONCE
OUTPATIENT
Start: 2023-01-10 | End: 2023-01-10

## 2023-01-10 RX ORDER — LIDOCAINE 40 MG/G
CREAM TOPICAL ONCE
OUTPATIENT
Start: 2023-01-10 | End: 2023-01-10

## 2023-01-10 RX ORDER — LIDOCAINE 40 MG/G
CREAM TOPICAL ONCE
Status: DISCONTINUED | OUTPATIENT
Start: 2023-01-10 | End: 2023-01-11 | Stop reason: HOSPADM

## 2023-01-10 RX ORDER — BACITRACIN ZINC AND POLYMYXIN B SULFATE 500; 1000 [USP'U]/G; [USP'U]/G
OINTMENT TOPICAL ONCE
OUTPATIENT
Start: 2023-01-10 | End: 2023-01-10

## 2023-01-10 RX ORDER — LIDOCAINE 50 MG/G
OINTMENT TOPICAL ONCE
OUTPATIENT
Start: 2023-01-10 | End: 2023-01-10

## 2023-01-10 RX ORDER — LIDOCAINE HYDROCHLORIDE 40 MG/ML
SOLUTION TOPICAL ONCE
OUTPATIENT
Start: 2023-01-10 | End: 2023-01-10

## 2023-01-10 RX ORDER — LIDOCAINE HYDROCHLORIDE 20 MG/ML
JELLY TOPICAL ONCE
OUTPATIENT
Start: 2023-01-10 | End: 2023-01-10

## 2023-01-10 RX ORDER — GINSENG 100 MG
CAPSULE ORAL ONCE
OUTPATIENT
Start: 2023-01-10 | End: 2023-01-10

## 2023-01-10 RX ORDER — CLOBETASOL PROPIONATE 0.5 MG/G
OINTMENT TOPICAL ONCE
OUTPATIENT
Start: 2023-01-10 | End: 2023-01-10

## 2023-01-10 NOTE — PROGRESS NOTES
Face to Face Encounter    [facility] Fax # [phone]     [address] [place] [address]  [phone]  Phone: [phone]  Fax# [phone]    Patient Information:      Amy Pozo [address] [facility] [address]   [phone]   : 1980  AGE: 38 y.o. GENDER: female   TODAYS DATE:  1/10/2023    Insurance:      PRIMARY INSURANCE:  Plan: MEDICAID [facility] DEPT OF JOB  Coverage: [place]  Effective Date: 2015  [id number] - (Medicaid)      SECONDARY INSURANCE:  Plan:   Coverage:   Effective Date:   @Standard TreasuryGROUPNUM@  @Standard TreasuryID@   @[facility]@     There were no vitals filed for this visit. Wt Readings from Last 1 Encounters:   01/10/23 98 lb 8 oz (44.7 kg)       Current Outpatient Medications   Medication Sig Dispense Refill    oxybutynin (DITROPAN) 5 MG tablet Take 5 mg by mouth 3 times daily      nitrofurantoin, macrocrystal-monohydrate, (MACROBID) 100 MG capsule Take 100 mg by mouth 2 times daily      alendronate (FOSAMAX) 70 MG tablet Take 70 mg by mouth every 7 days      MYRBETRIQ 50 MG TB24       pantoprazole (PROTONIX) 40 MG tablet Take 1 tablet by mouth daily      montelukast (SINGULAIR) 10 MG tablet Take 10 mg by mouth nightly      magnesium oxide (MAG-OX) 400 MG tablet Take 400 mg by mouth every evening      vitamin D3 (CHOLECALCIFEROL) 125 MCG (5000 UT) TABS tablet Take 1 tablet by mouth daily (Patient not taking: Reported on 2022)      simethicone (MYLICON) 80 MG chewable tablet Take 80 mg by mouth daily as needed for Flatulence      warfarin (COUMADIN) 2.5 MG tablet Take 2.5 mg by mouth See Admin Instructions Unsure of dose sequence      solifenacin (VESICARE) 5 MG tablet Take 5 mg by mouth daily (Patient not taking: Reported on 2022)      trospium (SANCTURA) 20 MG tablet Take 20 mg by mouth 2 times daily (Patient not taking: Reported on 2022)      cetirizine (ZYRTEC) 10 MG tablet Take 10 mg by mouth daily. oxyCODONE-acetaminophen (PERCOCET) 5-325 MG per tablet Take 1 tablet by mouth every 6 hours as needed. baclofen (LIORESAL) 10 MG tablet Take 10 mg by mouth 3 times daily. calcium-vitamin D (OSCAL-500) 500-200 MG-UNIT per tablet Take 1 tablet by mouth daily. ferrous sulfate 325 (65 FE) MG tablet Take 325 mg by mouth daily (with breakfast). potassium chloride SA (K-DUR;KLOR-CON) 20 MEQ tablet Take 20 mEq by mouth 2 times daily      therapeutic multivitamin-minerals (THERAGRAN-M) tablet Take 1 tablet by mouth daily.          Current Facility-Administered Medications   Medication Dose Route Frequency Provider Last Rate Last Admin    lidocaine (LMX) 4 % cream   Topical Once Park Tracey MD           Wound 05/03/22 Sacrum #2 (Active)   Wound Image   08/23/22 0930   Wound Etiology Pressure Stage 4 01/10/23 1001   Wound Cleansed Wound cleanser 01/10/23 1001   Dressing/Treatment Alginate with Ag;Dry dressing 11/08/22 1156   Wound Length (cm) 1 cm 01/10/23 1001   Wound Width (cm) 1.5 cm 01/10/23 1001   Wound Depth (cm) 1 cm 01/10/23 1001   Wound Surface Area (cm^2) 1.5 cm^2 01/10/23 1001   Change in Wound Size % (l*w) -26.05 01/10/23 1001   Wound Volume (cm^3) 1.5 cm^3 01/10/23 1001   Wound Healing % -215 01/10/23 1001   Post-Procedure Length (cm) 1 cm 11/01/22 1024   Post-Procedure Width (cm) 1 cm 11/01/22 1024   Post-Procedure Depth (cm) 0.3 cm 11/01/22 1024   Post-Procedure Surface Area (cm^2) 1 cm^2 11/01/22 1024   Post-Procedure Volume (cm^3) 0.3 cm^3 11/01/22 1024   Wound Assessment Granulation tissue 01/10/23 1001   Drainage Amount Moderate 01/10/23 1001   Drainage Description Serosanguinous 01/10/23 1001   Odor None 01/10/23 1001   Merlyn-wound Assessment Fragile 01/10/23 1001   Margins Attached edges 01/10/23 1001   Wound Thickness Description not for Pressure Injury Full thickness 11/29/22 0950   Number of days: 252       Wound 05/03/22 Ischium Left #3 (Active)   Wound Image   08/23/22 0930   Wound Etiology Pressure Stage 4 01/10/23 1001   Wound Cleansed Cleansed with saline 12/27/22 1056   Dressing/Treatment Moist to dry 11/08/22 1156   Wound Length (cm) 2 cm 01/10/23 1001   Wound Width (cm) 5.8 cm 01/10/23 1001   Wound Depth (cm) 3 cm 01/10/23 1001   Wound Surface Area (cm^2) 11.6 cm^2 01/10/23 1001   Change in Wound Size % (l*w) 71.22 01/10/23 1001   Wound Volume (cm^3) 34.8 cm^3 01/10/23 1001   Wound Healing % -116 01/10/23 1001   Post-Procedure Length (cm) 1 cm 11/01/22 1024   Post-Procedure Width (cm) 2 cm 11/01/22 1024   Post-Procedure Depth (cm) 0.3 cm 11/01/22 1024   Post-Procedure Surface Area (cm^2) 2 cm^2 11/01/22 1024   Post-Procedure Volume (cm^3) 0.6 cm^3 11/01/22 1024   Distance Tunneling (cm) 4.4 cm 01/10/23 1001   Tunneling Position ___ O'Clock 4 01/10/23 1001   Undermining Starts ___ O'Clock 5 01/10/23 1001   Undermining Ends___ O'Clock 1200 12/27/22 1056   Undermining Maxium Distance (cm) 5.8 12/27/22 1056   Wound Assessment Granulation tissue 01/10/23 1001   Drainage Amount Moderate 01/10/23 1001   Drainage Description Serosanguinous 01/10/23 1001   Odor Moderate 01/10/23 1001   Merlyn-wound Assessment Intact 01/10/23 1001   Margins Defined edges 01/10/23 1001   Wound Thickness Description not for Pressure Injury Full thickness 01/10/23 1001   Number of days: 252       Face to Face Encounter     I certify that this patient, Ramya Pringle is under my care and I am a physician. I had a face to face encounter on 1/10/2023. I certify that my clinical findings support the patient is homebound requiring considerable effort to leave home for short periods of time for medical reasons with the use of an assistive device and/or the assistance of another person. Skilled care for: Wound care and dressing changes. If services other than wound care are needed you will need to contact the patients PCP for orders.      Diagnosis Codes: L89.154, [id number]    Discharge Instructions [address], [address] [facility] [address]  Telephone: [phone]     Discharge Instructions     Important reminders:     **If you have any signs and symptoms of illness (Cough, fever, congestion, nausea, vomiting, diarrhea, etc.) please call the wound care center prior to your appointment. 1. Increase Protein intake for optimal wound healing  2. No added salt to reduce any swelling  3. If diabetic, maintain good glucose control  4. If you smoke, smoking prohibits wound healing, we ask that you refrain from smoking. 5. When taking antibiotics take the entire prescription as ordered. Do not stop taking until medication is all gone unless otherwise instructed. 6. Exercise as tolerated. 7. Keep weight off wounds and reposition every 2 hours if applicable. 8. If wound(s) is on your lower extremity, elevate legs to the level of the heart or above for 30 minutes 4-5 times a day and/or when sitting. Avoid standing for long periods of time. 9. Do not get wounds wet in bath or shower unless otherwise instructed by your physician. If your wound is on your foot or leg, you may purchase a cast bag. Please ask at the pharmacy. If Vascular testing is ordered, please call [address] [place] [address] [phone]) to schedule. Vascular tests ordered by Wound Care Physicians may take up to 2 hours to complete. Please keep that in mind when scheduling. If Vascular testing is scheduled, please bring supplies to replace your dressing after testing is done. The vascular department does not stock supplies. Wound: Sacrum, Left ischium     With each dressing change, rinse wounds with 0.9% Saline. (May use wound wash or soft contact solution. Both can be purchased at a local drug store). If unable to obtain saline, may use a gentle soap and water.      Dressing care: Sacrum, Left ischium- moist to dry with roll gauze (use only one piece to pack all around wound & leave tail to be able to remove) dry dressing- change daily and as needed for drainage or soilage until NPWT arrives. once NPWT arrives start At home:Skin prep to umang-wound, drape around wound to protect good skin, bridge Left ischium and Sacrum together and bridge to hip, NPWT continuous at 120 mmHg or 125 mmHg (depending on the type of NPWT device), change Monday, Wednesday, & Friday. Canister to be changed once weekly unless full. Home care to change. If unable to place NPWT, place wet to dry, dry dressing- change daily. Increase protein, continue to drink Myron. Turn & reposition every 1-2 hours and as needed for pressure relief and comfort. Up in chair for meals only. Home Care Agency/Facility: [facility]     Your wound-care supplies will be provided by: [address] f: [phone] f: [phone] p: [phone] [email or web address]    Please note, depending on your insurance coverage, you may have out-of-pocket expenses for these supplies. Someone from the company should call you to confirm your order and discuss those potential costs before they ship your products -- please anticipate that call. If your out-of-pocket cost could be substantial, Many companies have financial hardship programs for patients who qualify, so please ask about that if you might need a hand. If you have any questions about your supplies or your potential out-of-pocket costs, or if you need to place an order for a refill of supplies (typically monthly), please call the company directly. Your  is Noni Salomon     Follow up with Dr Gabby Sanz In 2 week(s) in the wound care center.         Wound Care Center Information: Should you experience any significant changes in your wound(s) or have questions about your wound care, please contact the [facility] at [phone] Monday  - Thursday 8:00 am - 4:00 pm and Friday 8:00 am - 1:00pm. If you need help with your wound outside these hours and cannot wait until we are again available, contact your PCP or go to the hospital emergency room. PLEASE NOTE: IF YOU ARE UNABLE TO OBTAIN WOUND SUPPLIES, CONTINUE TO USE THE SUPPLIES YOU HAVE AVAILABLE UNTIL YOU ARE ABLE TO REACH US. IT IS MOST IMPORTANT TO KEEP THE WOUND COVERED AT ALL TIMES. Patient Experience     Thank you for trusting us with your care. You may receive a survey from a company called [employer] asking for your feedback. We would appreciate it if you took a few minutes to share your experience. Your input is very valuable to us        Skilled nurse to evaluate and treat for wound care. Change dressing as ordered  once a day on Monday, Wednesday, and Friday using clean technique. Patient/Family/caregiver may change dressings as needed as instructed when Home Care unavailable.      PROVIDER'S NAME/NPI  Bryce Garcia MD NPI: [id number]   I give permission to coordinate the care for this patient

## 2023-01-10 NOTE — PROGRESS NOTES
[address] [facility] for V.A.C.®  Negative Pressure Wound Therapy    [facility] - Fax to [facility]: Anna Bowden [address]60  [address]  [phone]  WOUND CARE Dept: Sidra Schumacher  [phone]  Patient Information:   Amy 86 101[address] [facility] [address] [place] [address]   [phone]   : 1980  AGE: 38 y.o. GENDER: female   TODAYS DATE:  1/10/2023  Insurance:   PRIMARY INSURANCE:  Plan: MEDICAID [facility] DEPT OF JOB  Coverage: [place]  Effective Date: 2015  [id number] - (Medicaid)    Payer/Plan Subscr  Sex Relation Sub. Ins. ID Effective Group Num   1. [place]ID [place] -* DEMARCO SCHWARZ 1980 Female Self [id number] 1/1/15                                    [address]       Patient Wound Information:     Duration of the V.A.C.®  Negative Pressure Wound Therapy: 3 Month which includes up to 15 dressings per wound and up to 10 canisters per month    Goal at the completion of V.A.C.®  Negative Pressure Therapy: Assist in granulation tissue formation     Will HomeCare provide V.A.C. ®  Therapy? Yes Homecare will provide VAC Therapy. The date in which Homecare will initiate VAC Therapy is 2023     [address] [facility] (sending order to start care)    Equipment for Delivery:     Delivery Location V.A.C. ® Therapy Pump: Patient's Home Address located under Patient Information on the top of this form    Up to 15 dressings per wound, per month  VAC Canisters: Up to 10 canisters per month  V.A.C.® Dressing: GRANUFOAM VAC Dressing Medium    Clinical Information by Wound Type: Was NPWT initiated in an inpatient facility? No or  Has the patient been on NPWT anytime during the last 60 days? No     Is the patients nutritional status compromised?  No    Please jose all dressings that apply  Please indicate all other therapies that have been previously tried and/or failed to maintain a moist wound environment: Saline moisten gauze  and Alginate    Please jose all that apply  If other therapies were considered and ruled out, what conditions prevented you from using other therapies prior to applying V.A.C. ® Therapy? Need for accelerated granulation tissue    Please jose all that apply  Which of the following co?morbidities apply? Immobility and Other Multiple Sclerosis    Is Osteomyelitis present in Wound? No    Patient's Primary Wound Type:     Patient's Primary Wound Type: Pressure Ulcer Stage IV The patient is being turned/positioned , Moisture and/or incontinence are being managed, The patient has a group 2 or 3 surface been used for ulcer located on the posterior trunk or pelvis, and The pressure ulcer is greater than 30 days    Is this wound a direct result of an accident? No     Wound(s) Description:     Is there eschar tissue present in the wound? No Eschar in noted in Wound(s) base  Has debridement been attempted in the last 10 days? No  Are serial debridements required?  Yes  Wound # 2 and 3 (Left Ischium and Sacrum- bridged together)      Wound 05/03/22 Sacrum #2 (Active)   Wound Image   08/23/22 0930   Wound Etiology Pressure Stage 4 01/10/23 1001   Wound Cleansed Wound cleanser 01/10/23 1001   Dressing/Treatment Alginate with Ag;Dry dressing 11/08/22 1156   Wound Length (cm) 1 cm 01/10/23 1001   Wound Width (cm) 1.5 cm 01/10/23 1001   Wound Depth (cm) 1 cm 01/10/23 1001   Wound Surface Area (cm^2) 1.5 cm^2 01/10/23 1001   Change in Wound Size % (l*w) -26.05 01/10/23 1001   Wound Volume (cm^3) 1.5 cm^3 01/10/23 1001   Wound Healing % -215 01/10/23 1001   Post-Procedure Length (cm) 1 cm 11/01/22 1024   Post-Procedure Width (cm) 1 cm 11/01/22 1024   Post-Procedure Depth (cm) 0.3 cm 11/01/22 1024   Post-Procedure Surface Area (cm^2) 1 cm^2 11/01/22 1024   Post-Procedure Volume (cm^3) 0.3 cm^3 11/01/22 1024   Wound Assessment Granulation tissue 01/10/23 1001   Drainage Amount Moderate 01/10/23 1001 Drainage Description Serosanguinous 01/10/23 1001   Odor None 01/10/23 1001   Merlyn-wound Assessment Fragile 01/10/23 1001   Margins Attached edges 01/10/23 1001   Wound Thickness Description not for Pressure Injury Full thickness 11/29/22 0950   Number of days: 252       Wound 05/03/22 Ischium Left #3 (Active)   Wound Image   08/23/22 0930   Wound Etiology Pressure Stage 4 01/10/23 1001   Wound Cleansed Cleansed with saline 12/27/22 1056   Dressing/Treatment Moist to dry 11/08/22 1156   Wound Length (cm) 2 cm 01/10/23 1001   Wound Width (cm) 5.8 cm 01/10/23 1001   Wound Depth (cm) 3 cm 01/10/23 1001   Wound Surface Area (cm^2) 11.6 cm^2 01/10/23 1001   Change in Wound Size % (l*w) 71.22 01/10/23 1001   Wound Volume (cm^3) 34.8 cm^3 01/10/23 1001   Wound Healing % -116 01/10/23 1001   Post-Procedure Length (cm) 1 cm 11/01/22 1024   Post-Procedure Width (cm) 2 cm 11/01/22 1024   Post-Procedure Depth (cm) 0.3 cm 11/01/22 1024   Post-Procedure Surface Area (cm^2) 2 cm^2 11/01/22 1024   Post-Procedure Volume (cm^3) 0.6 cm^3 11/01/22 1024   Distance Tunneling (cm) 4.4 cm 01/10/23 1001   Tunneling Position ___ O'Clock 4 01/10/23 1001   Undermining Starts ___ O'Clock 5 01/10/23 1001   Undermining Ends___ O'Clock 1200 12/27/22 1056   Undermining Maxium Distance (cm) 5.8 12/27/22 1056   Wound Assessment Granulation tissue 01/10/23 1001   Drainage Amount Moderate 01/10/23 1001   Drainage Description Serosanguinous 01/10/23 1001   Odor Moderate 01/10/23 1001   Merlyn-wound Assessment Intact 01/10/23 1001   Margins Defined edges 01/10/23 1001   Wound Thickness Description not for Pressure Injury Full thickness 01/10/23 1001   Number of days: 252        Electronically signed by Lexy Galloway RN on 1/10/2023 at 10:35 AM    Providers Information:      PROVIDER'S NAME/NPI: : Jolanta Patrick MD NPI: [id number]

## 2023-01-10 NOTE — PROGRESS NOTES
Jocelyne Lui  Progress Note       Augustine Oneal  AGE: 38 y.o. GENDER: female  : 1980  TODAY'S DATE:  1/10/2023    Subjective:     Chief Complaint   Patient presents with    Wound Check     Buttock           HISTORY of PRESENT ILLNESS HPI     Augustine Oneal is a 38 y.o. female who presents today for wound evaluation. History of Wound: Pressure ulcerations of the right hip and sacral region    Wound Pain:  mild  Severity:  2 / 10   Wound Type:  pressure  Modifying Factors:  chronic pressure and decreased mobility  Associated Signs/Symptoms:  none        PAST MEDICAL HISTORY        Diagnosis Date    Anemia     DVT (deep venous thrombosis) ([facility])     MRSA (methicillin resistant staph aureus) culture positive 2017    urine    MS (multiple sclerosis) ([facility] [address]     Pulmonary embolism ([facility])     Sacral decubitus ulcer, stage IV ([facility] [address] [address] 2022       PAST SURGICAL HISTORY    Past Surgical History:   Procedure Laterality Date    FINGER AMPUTATION      LEG SURGERY Left 2022    WOUND DEBRIDEMENT OF LEFT ISCHIAL ULCER performed by Oleh Claude, MD at [address] [facility] N/A 2022    WOUND DEBRIDEMENT OF SACRAL DECUBITUS performed by Oleh Claude, MD at [facility]    History reviewed. No pertinent family history.     SOCIAL HISTORY    Social History     Tobacco Use    Smoking status: Never    Smokeless tobacco: Never   Vaping Use    Vaping Use: Never used   Substance Use Topics    Alcohol use: Not Currently     Comment: socially    Drug use: No       ALLERGIES    Allergies   Allergen Reactions    Ambien [Zolpidem Tartrate]     Ampicillin     Bactrim     Morphine     Risperidone And Related     Vicodin [Hydrocodone-Acetaminophen]     Zoloft        MEDICATIONS    Current Outpatient Medications on File Prior to Encounter   Medication Sig Dispense Refill    oxybutynin (DITROPAN) 5 MG tablet Take 5 mg by mouth 3 times daily      nitrofurantoin, macrocrystal-monohydrate, (MACROBID) 100 MG capsule Take 100 mg by mouth 2 times daily      alendronate (FOSAMAX) 70 MG tablet Take 70 mg by mouth every 7 days      MYRBETRIQ 50 MG TB24       pantoprazole (PROTONIX) 40 MG tablet Take 1 tablet by mouth daily      montelukast (SINGULAIR) 10 MG tablet Take 10 mg by mouth nightly      magnesium oxide (MAG-OX) 400 MG tablet Take 400 mg by mouth every evening      vitamin D3 (CHOLECALCIFEROL) 125 MCG (5000 UT) TABS tablet Take 1 tablet by mouth daily (Patient not taking: Reported on 12/2/2022)      simethicone (MYLICON) 80 MG chewable tablet Take 80 mg by mouth daily as needed for Flatulence      warfarin (COUMADIN) 2.5 MG tablet Take 2.5 mg by mouth See Admin Instructions Unsure of dose sequence      solifenacin (VESICARE) 5 MG tablet Take 5 mg by mouth daily (Patient not taking: Reported on 12/2/2022)      trospium (SANCTURA) 20 MG tablet Take 20 mg by mouth 2 times daily (Patient not taking: Reported on 12/2/2022)      cetirizine (ZYRTEC) 10 MG tablet Take 10 mg by mouth daily. oxyCODONE-acetaminophen (PERCOCET) 5-325 MG per tablet Take 1 tablet by mouth every 6 hours as needed. baclofen (LIORESAL) 10 MG tablet Take 10 mg by mouth 3 times daily. calcium-vitamin D (OSCAL-500) 500-200 MG-UNIT per tablet Take 1 tablet by mouth daily. ferrous sulfate 325 (65 FE) MG tablet Take 325 mg by mouth daily (with breakfast). potassium chloride SA (K-DUR;KLOR-CON) 20 MEQ tablet Take 20 mEq by mouth 2 times daily      therapeutic multivitamin-minerals (THERAGRAN-M) tablet Take 1 tablet by mouth daily. No current facility-administered medications on file prior to encounter. REVIEW OF SYSTEMS    Constitutional: negative  Eyes: negative  Ears, nose, mouth, throat, and face: negative  Gastrointestinal: negative  Behavioral/Psych: negative      Objective:       Wt 98 lb 8 oz (44.7 kg)   BMI 18.02 kg/m² PHYSICAL EXAM    General Appearance: alert and oriented to person, place and time, well-developed and well-nourished, in no acute distress  Head: normocephalic and atraumatic  Neck: neck supple and non tender without mass, no thyromegaly or thyroid nodules, no cervical lymphadenopathy   Abdomen: soft, non-tender, non-distended, normal bowel sounds, no masses or organomegaly      Assessment:     Patient Active Problem List   Diagnosis    DVT prophylaxis    Pulmonary emboli ([address]    Long term (current) use of anticoagulants    Sacral decubitus ulcer, stage IV (H[facility])    Left ischial pressure sore, stage IV ([address]       Wound 05/03/22 Sacrum #2 (Active)   Wound Image   08/23/22 0930   Wound Etiology Pressure Stage 3 01/10/23 1001   Wound Cleansed Wound cleanser 01/10/23 1001   Dressing/Treatment Alginate with Ag;Dry dressing 11/08/22 1156   Wound Length (cm) 1 cm 01/10/23 1001   Wound Width (cm) 1.5 cm 01/10/23 1001   Wound Depth (cm) 1 cm 01/10/23 1001   Wound Surface Area (cm^2) 1.5 cm^2 01/10/23 1001   Change in Wound Size % (l*w) -26.05 01/10/23 1001   Wound Volume (cm^3) 1.5 cm^3 01/10/23 1001   Wound Healing % -215 01/10/23 1001   Post-Procedure Length (cm) 1 cm 11/01/22 1024   Post-Procedure Width (cm) 1 cm 11/01/22 1024   Post-Procedure Depth (cm) 0.3 cm 11/01/22 1024   Post-Procedure Surface Area (cm^2) 1 cm^2 11/01/22 1024   Post-Procedure Volume (cm^3) 0.3 cm^3 11/01/22 1024   Wound Assessment Granulation tissue 01/10/23 1001   Drainage Amount Moderate 01/10/23 1001   Drainage Description Serosanguinous 01/10/23 1001   Odor None 01/10/23 1001   Merlyn-wound Assessment Fragile 01/10/23 1001   Margins Attached edges 01/10/23 1001   Wound Thickness Description not for Pressure Injury Full thickness 11/29/22 0950   Number of days: 252       Wound 05/03/22 Ischium Left #3 (Active)   Wound Image   08/23/22 0978   Wound Etiology Pressure Stage 4 01/10/23 1001   Wound Cleansed Cleansed with saline 12/27/22 9484 Dressing/Treatment Moist to dry 11/08/22 1156   Wound Length (cm) 2 cm 01/10/23 1001   Wound Width (cm) 5.8 cm 01/10/23 1001   Wound Depth (cm) 3 cm 01/10/23 1001   Wound Surface Area (cm^2) 11.6 cm^2 01/10/23 1001   Change in Wound Size % (l*w) 71.22 01/10/23 1001   Wound Volume (cm^3) 34.8 cm^3 01/10/23 1001   Wound Healing % -116 01/10/23 1001   Post-Procedure Length (cm) 1 cm 11/01/22 1024   Post-Procedure Width (cm) 2 cm 11/01/22 1024   Post-Procedure Depth (cm) 0.3 cm 11/01/22 1024   Post-Procedure Surface Area (cm^2) 2 cm^2 11/01/22 1024   Post-Procedure Volume (cm^3) 0.6 cm^3 11/01/22 1024   Distance Tunneling (cm) 4.4 cm 01/10/23 1001   Tunneling Position ___ O'Clock 4 01/10/23 1001   Undermining Starts ___ O'Clock 5 01/10/23 1001   Undermining Ends___ O'Clock 1200 12/27/22 1056   Undermining Maxium Distance (cm) 5.8 12/27/22 1056   Wound Assessment Granulation tissue 01/10/23 1001   Drainage Amount Moderate 01/10/23 1001   Drainage Description Serosanguinous 01/10/23 1001   Odor Moderate 01/10/23 1001   Merlyn-wound Assessment Intact 01/10/23 1001   Margins Defined edges 01/10/23 1001   Wound Thickness Description not for Pressure Injury Full thickness 01/10/23 1001   Number of days: 18     42-year-old female with multiple sclerosis who is seen in follow-up for a stage IV ulcerations of the left ischial region and sacral region. Both areas are clean healthy appearing. Plan: We will change her to a wound VAC at both sites at 125 mmHg. Follow-up in the wound center in 2 weeks.     Treatment Plan          Written Patient Discharge Instructions Given            Electronically signed by Jia Wyman MD on 1/10/2023 at 10:35 AM

## 2023-01-10 NOTE — PLAN OF CARE
Discharge instructions given. Patient verbalized understanding. Return to [facility] in 2 week(s).   Called/faxed orders to [facility] for NPWT & [facility] to start care

## 2023-01-13 NOTE — PROGRESS NOTES
[address] [facility]:      [address] f: [phone] f: [phone] p: [phone] [email or web address]     Ordering Center: [facility] Bowden [address]  [facility], [place], operated [facility] [address]  [phone]  Dept: [phone]   Fax# [phone]    Patient Information:      [facility] [address]   [phone]   : 1980  AGE: 38 y.o. GENDER: female   TODAYS DATE:  2023    Insurance:      PRIMARY INSURANCE:  Plan:   Coverage:   Effective Date:   Group Number: [unfilled]  Subscriber Number: [id number] - (Medicaid)    Payer/Plan Subscr  Sex Relation Sub. Ins. ID Effective Group Num   1.  [place] -* DEMARCO SCHWARZ 1980 Female Self [id number] 1/1/15                                    [address]         Patient Wound Information:     Additional ICD-10 Codes:     Patient Active Problem List   Diagnosis Code    DVT prophylaxis Z29.9    Pulmonary emboli ([facility] [address] I26.99    Long term (current) use of anticoagulants Z79.01    Sacral decubitus ulcer, stage IV (HCC) L89.154    Left ischial pressure sore, stage IV (HCC) L89.324       WOUNDS REQUIRING DRESSING SUPPLIES:     Wound 22 Sacrum #2 (Active)   Wound Etiology Pressure Stage 4 01/10/23 1001   Wound Cleansed Wound cleanser 01/10/23 1001   Wound Length (cm) 1 cm 01/10/23 1001   Wound Width (cm) 1.5 cm 01/10/23 1001   Wound Depth (cm) 1 cm 01/10/23 1001   Wound Surface Area (cm^2) 1.5 cm^2 01/10/23 1001   Change in Wound Size % (l*w) -26.05 01/10/23 1001   Wound Volume (cm^3) 1.5 cm^3 01/10/23 1001   Wound Healing % -215 01/10/23 1001   Wound Assessment Granulation tissue 01/10/23 1001   Drainage Amount Moderate 01/10/23 1001   Drainage Description Serosanguinous 01/10/23 1001   Odor None 01/10/23 1001   Merlyn-wound Assessment Fragile 01/10/23 1001   Margins Attached edges 01/10/23 1001   Number of days: 255       Wound 22 Ischium Left #3 (Active)   Wound Etiology Pressure Stage 4 01/10/23 1001   Wound Cleansed Cleansed with saline 12/27/22 1056   Wound Length (cm) 2 cm 01/10/23 1001   Wound Width (cm) 5.8 cm 01/10/23 1001   Wound Depth (cm) 3 cm 01/10/23 1001   Wound Surface Area (cm^2) 11.6 cm^2 01/10/23 1001   Change in Wound Size % (l*w) 71.22 01/10/23 1001   Wound Volume (cm^3) 34.8 cm^3 01/10/23 1001   Wound Healing % -116 01/10/23 1001   Distance Tunneling (cm) 4.4 cm 01/10/23 1001   Tunneling Position ___ O'Clock 4 01/10/23 1001   Undermining Starts ___ O'Clock 5 01/10/23 1001   Undermining Ends___ O'Clock 1200 12/27/22 1056   Undermining Maxium Distance (cm) 5.8 12/27/22 1056   Wound Assessment Granulation tissue 01/10/23 1001   Drainage Amount Moderate 01/10/23 1001   Drainage Description Serosanguinous 01/10/23 1001   Odor Moderate 01/10/23 1001   Merlyn-wound Assessment Intact 01/10/23 1001   Margins Defined edges 01/10/23 1001   Wound Thickness Description not for Pressure Injury Full thickness 01/10/23 1001   Number of days: 255          Supplies Requested :      DISPENSE AS WRITTEN    WOUND #: 2 and 3   PRIMARY DRESSING:    None   Cover and Secure with: 4X4 non woven gauze pad  ABD pad  Conforming roll gauze     FREQUENCY OF DRESSING CHANGES:  Daily    Wound Thickness [x] Full   []Partial     Patient Wound(s) Debrided: [x] Yes   [] No    Debridement Date: 11/1/2022    Debribement Type: Excisional/Sharp    ADDITIONAL ITEMS:  [] Gloves Small  [x] Gloves Medium [] Gloves Large [] Gloves XLarge [] Paper Tape 2\" [] Paper Tape 3\"  [] Medipore Tape 3\" [] Medipore Tape 4\"    [] Hypofix skin sensitive tape 2\"  [x] Hypofix skin sensitive tape 4\"  [x] Saline  [x] Skin Prep   [] Adhesive Remover   [] Cotton Tip Applicators  [] Tubular Stocking   [] Size E  [] Size G  [] Other:    Patient currently being seen by Home Health: [] Yes   [x] No    Quantity of days dispensed:  []15  [x]30  []60  []90 Days    Order valid for 90 days    Provider Information:      PROVIDER'S NAME/NPI  Tere Hollis MD NPI: [id number]   I give permission to coordinate the care for this patient

## 2023-01-16 ENCOUNTER — TELEPHONE (OUTPATIENT)
Dept: PHARMACY | Age: 43
End: 2023-01-16

## 2023-01-17 ENCOUNTER — ANTI-COAG VISIT (OUTPATIENT)
Dept: PHARMACY | Age: 43
End: 2023-01-17
Payer: MEDICAID

## 2023-01-17 DIAGNOSIS — Z29.9 DVT PROPHYLAXIS: Primary | ICD-10-CM

## 2023-01-17 DIAGNOSIS — I26.09 OTHER ACUTE PULMONARY EMBOLISM WITH ACUTE COR PULMONALE (HCC): ICD-10-CM

## 2023-01-17 DIAGNOSIS — Z79.01 LONG TERM (CURRENT) USE OF ANTICOAGULANTS: ICD-10-CM

## 2023-01-17 LAB — INTERNATIONAL NORMALIZATION RATIO, POC: 1.8

## 2023-01-17 PROCEDURE — 85610 PROTHROMBIN TIME: CPT

## 2023-01-17 PROCEDURE — 99212 OFFICE O/P EST SF 10 MIN: CPT

## 2023-01-17 NOTE — PROGRESS NOTES
Ms. Daniela Mcginnis is a 38 y.o. y/o female with history of DVT, PE in about 2010. She presents today for anticoagulation monitoring and adjustment. Pertinent PMH: DVT assoc with PICC line  Hx of multiple sclerosis,     Patient Reported Findings:  Yes     No  []   [x]       Patient verifies current dosing regimen as listed---> does not know dose, but takes what RN gives her but then states that she might have taken 5 mg daily but took old dose. Does not know dose, uses AVS but AVS ran out last week so she is unsure after that --> states that aide followed avs for doses --> unable to verify --> unable to state dose---> States did everything she was supposed to do  []   [x]       S/S bleeding/bruising/swelling/SOB -denies   []   [x]       Blood in urine or stool - denies   [x]   []       Procedures scheduled in the future at this time  ---> having wound procedure on 12/6, needs to hold warfarin for 5 days and ridge with lovenox. Also having botox inj, unsure if needs to hold warfarin (last year when had botox inj held 5 days and bridged with lovenox). Pt states she has an appt today to discuss procedure since having wound procedure week prior --> pt unsure when she resumed warfarin after procedure. Called and spoke to prasanth HEWITT, she believes pt resumed warfarin on 12/6 and does not see any missing doses. Botox procedure r/s for 1/25/23   []   [x]       Missed Dose -thinks missed at least 2 doses --> thinks missed one dose within last week but unable to state what day.---> denies ---> 12/23 for sure, not sure what other days --> no missed doses    []   [x]       Extra Dose - denies  []   [x]       Change in medications-using perc and tylenol prn for pain with new braces- states this will continue---> takes tylenol prn ---> no more tylenol, no other changes in meds --> unsure if patient is still taking cipro, new multivit and will restart biotin soon. ---> cipro then levo and doxy, states has 1-2 days left --> on macrobid recently --> started MVI flinstones (contain 60 mcg of vit k). No abx ---> no changes   [x]   []       Change in health/diet/appetite Normal vegetable is about twice a week and generally a low vitamin K. When she has broccoli it would be in a stir truong. May have a salad only once a month.---> had vegetables, no significant vit k ---> had greens twice since last visit. States that she had done a detox with tea (haven and turmeric tea)  had greens 2-3 times in the past week, no NVD --> had salad, greens and cabbage this week --> unsure if she had any vit k---> had vit k multiple times in the past week --> had salad yesterday ---> some green beans and zucchini---> eating more greens, no boost/ensure/V8/liver, no NVD --> returned to eating 1 spoonful of sea rivero every day, plans to continue (does not have vit k)  []   [x]       Change in alcohol use adamant that she did not drink alcohol while in [place] --> states now that she had one glass of alcohol in [facility]. none recently --> denies---> had a large mixed drink in last week---> denies    []   [x]       Change in activity  []   [x]       Hospital admission-   []   [x]       Emergency department visit- 10/26- ER, cipro, INR 2.0      11/21- for back pain. INR 1, was given 10 mg of warfarin that day prior to leaving. Was prescribed macrobid x 7 d.   []   [x]       Other complaints   Clinical Outcomes:  Yes     No  []   [x]       Major bleeding event  []   [x]       Thromboembolic event    Duration of warfarin therapy: Indefinitely  INR Range:  2.0-3.0     Continues to miss apts and miss doses. Pt rarely offers up any information to determine cause of INR fluctuations. Drucilla Blizzard, RN fills medipack. Will need to call and provide instructions after apts [phone]. INR is 1.8 today after increasing weekly dose   Called and LVM for Drucilla Blizzard with dosing instructions.    Take 5 mg tonight then continue dose of 5mg Mon Wed and Fri and 3.75mg all other days   Continue lovenox injections twice daily until therapeutic. Encouraged to maintain a consistency of vegetables/salads.    Recheck INR on 1 week, 1/24     **consent form signed 1/9/23    Referring PCP is Dr. Huntley Bloch results  INR (no units)   Date Value   12/06/2022 0.95   06/17/2022 5.12 (HH)   10/05/2020 8.93 (HH)   09/19/2020 3.22 (H)     INR,(POC) (no units)   Date Value   01/09/2023 1.9   12/27/2022 1.2   12/16/2022 1.7   12/13/2022 1.1     For Pharmacy Admin Tracking Only    Intervention Detail: Dose Adjustment: 1, reason: Therapy Optimization  Total # of Interventions Recommended: 1  Total # of Interventions Accepted: 1  Time Spent (min): 15

## 2023-01-19 ENCOUNTER — TELEPHONE (OUTPATIENT)
Dept: PHARMACY | Age: 43
End: 2023-01-19

## 2023-01-23 ENCOUNTER — ANTI-COAG VISIT (OUTPATIENT)
Dept: PHARMACY | Age: 43
End: 2023-01-23
Payer: MEDICAID

## 2023-01-23 DIAGNOSIS — Z79.01 LONG TERM (CURRENT) USE OF ANTICOAGULANTS: ICD-10-CM

## 2023-01-23 DIAGNOSIS — I26.09 OTHER ACUTE PULMONARY EMBOLISM WITH ACUTE COR PULMONALE (HCC): ICD-10-CM

## 2023-01-23 DIAGNOSIS — Z29.9 DVT PROPHYLAXIS: Primary | ICD-10-CM

## 2023-01-23 LAB — INTERNATIONAL NORMALIZATION RATIO, POC: 3.8

## 2023-01-23 PROCEDURE — 99212 OFFICE O/P EST SF 10 MIN: CPT

## 2023-01-23 PROCEDURE — 85610 PROTHROMBIN TIME: CPT

## 2023-01-23 NOTE — DISCHARGE INSTRUCTIONS
[address], [address]  Telephone: [phone]     Discharge Instructions     Important reminders:     **If you have any signs and symptoms of illness (Cough, fever, congestion, nausea, vomiting, diarrhea, etc.) please call the wound care center prior to your appointment. 1. Increase Protein intake for optimal wound healing  2. No added salt to reduce any swelling  3. If diabetic, maintain good glucose control  4. If you smoke, smoking prohibits wound healing, we ask that you refrain from smoking. 5. When taking antibiotics take the entire prescription as ordered. Do not stop taking until medication is all gone unless otherwise instructed. 6. Exercise as tolerated. 7. Keep weight off wounds and reposition every 2 hours if applicable. 8. If wound(s) is on your lower extremity, elevate legs to the level of the heart or above for 30 minutes 4-5 times a day and/or when sitting. Avoid standing for long periods of time. 9. Do not get wounds wet in bath or shower unless otherwise instructed by your physician. If your wound is on your foot or leg, you may purchase a cast bag. Please ask at the pharmacy. If Vascular testing is ordered, please call [address] [place] [address] [phone]) to schedule. Vascular tests ordered by Wound Care Physicians may take up to 2 hours to complete. Please keep that in mind when scheduling. If Vascular testing is scheduled, please bring supplies to replace your dressing after testing is done. The vascular department does not stock supplies. Wound: Sacrum, Left ischium     With each dressing change, rinse wounds with 0.9% Saline. (May use wound wash or soft contact solution. Both can be purchased at a local drug store). If unable to obtain saline, may use a gentle soap and water. Dressing care: You will need to ask your Primary Care Physician for PT/OT orders.  Sacrum, Left ischium- In clinic: wet to dry, dry dressing. NPWT. At home:Skin prep to umang-wound, drape around wound to protect good skin, bridge Left ischium and Sacrum together and bridge to hip, NPWT continuous at 120 mmHg or 125 mmHg (depending on the type of NPWT device), change Monday, Wednesday, & Friday. Canister to be changed once weekly unless full. Home care to change. If unable to place NPWT, place wet to dry, dry dressing- change daily. Increase protein, continue to drink Myron. Turn & reposition every 1-2 hours and as needed for pressure relief and comfort. Up in chair for meals only. Home Care Agency/Facility: [facility]     Your wound-care supplies will be provided by: [address] f: [phone] f: [phone] p: [phone] [email or web address]    Please note, depending on your insurance coverage, you may have out-of-pocket expenses for these supplies. Someone from the company should call you to confirm your order and discuss those potential costs before they ship your products -- please anticipate that call. If your out-of-pocket cost could be substantial, Many companies have financial hardship programs for patients who qualify, so please ask about that if you might need a hand. If you have any questions about your supplies or your potential out-of-pocket costs, or if you need to place an order for a refill of supplies (typically monthly), please call the company directly. Your  is Jason Fu     Follow up with Dr Reva Sanchez In 1 week(s) in the wound care center.         Wound Care Center Information: Should you experience any significant changes in your wound(s) or have questions about your wound care, please contact the [facility] at [phone] Monday  - Thursday 8:00 am - 4:00 pm and Friday 8:00 am - 1:00pm. If you need help with your wound outside these hours and cannot wait until we are again available, contact your PCP or go to the hospital emergency room. PLEASE NOTE: IF YOU ARE UNABLE TO OBTAIN WOUND SUPPLIES, CONTINUE TO USE THE SUPPLIES YOU HAVE AVAILABLE UNTIL YOU ARE ABLE TO REACH US. IT IS MOST IMPORTANT TO KEEP THE WOUND COVERED AT ALL TIMES. Patient Experience     Thank you for trusting us with your care. You may receive a survey from a company called [employer] asking for your feedback. We would appreciate it if you took a few minutes to share your experience.   Your input is very valuable to us

## 2023-01-23 NOTE — PROGRESS NOTES
Ms. Alicia Canchola is a 38 y.o. y/o female with history of DVT, PE in about 2010. She presents today for anticoagulation monitoring and adjustment. Pertinent PMH: DVT assoc with PICC line  Hx of multiple sclerosis,     Patient Reported Findings:  Yes     No  []   [x]       Patient verifies current dosing regimen as listed---> does not know dose, but takes what RN gives her but then states that she might have taken 5 mg daily but took old dose. Does not know dose, uses AVS but AVS ran out last week so she is unsure after that --> states that aide followed avs for doses --> unable to verify --> unable to state dose---> States did everything she was supposed to do  []   [x]       S/S bleeding/bruising/swelling/SOB -denies   []   [x]       Blood in urine or stool - denies   [x]   []       Procedures scheduled in the future at this time  ---> having wound procedure on 12/6, needs to hold warfarin for 5 days and ridge with lovenox. Also having botox inj, unsure if needs to hold warfarin (last year when had botox inj held 5 days and bridged with lovenox). Pt states she has an appt today to discuss procedure since having wound procedure week prior --> pt unsure when she resumed warfarin after procedure. Called and spoke to prasanth HEWITT, she believes pt resumed warfarin on 12/6 and does not see any missing doses. Botox procedure r/s for 1/25/23 --> states she forgot about botox inj 1/25 and did not hold warfarin so will r/s   []   [x]       Missed Dose  no missed doses    []   [x]       Extra Dose - denies  []   [x]       Change in medications-using perc and tylenol prn for pain with new braces- states this will continue---> takes tylenol prn ---> no more tylenol, no other changes in meds --> started MVI flinstones (contain 60 mcg of vit k). No abx ---> no changes   []   [x]       Change in health/diet/appetite Normal vegetable is about twice a week and generally a low vitamin K.  When she has broccoli it would be in a stir truong. May have a salad only once a month.---> had vegetables, no significant vit k ---> had greens twice since last visit. States that she had done a detox with tea (haven and turmeric tea)  had greens 2-3 times in the past week, no NVD --> had salad, greens and cabbage this week --> unsure if she had any vit k---> had vit k multiple times in the past week --> had salad yesterday ---> some green beans and zucchini---> eating more greens, no boost/ensure/V8/liver, no NVD --> returned to eating 1 spoonful of sea rivero every day, plans to continue (does not have vit k) --> no changes, no vit k   []   [x]       Change in alcohol use adamant that she did not drink alcohol while in [place] --> states now that she had one glass of alcohol in [facility]. none recently --> denies---> had a large mixed drink in last week---> denies    []   [x]       Change in activity  []   [x]       Hospital admission-   []   [x]       Emergency department visit- 10/26- ER, cipro, INR 2.0      11/21- for back pain. INR 1, was given 10 mg of warfarin that day prior to leaving. Was prescribed macrobid x 7 d.   []   [x]       Other complaints   Clinical Outcomes:  Yes     No  []   [x]       Major bleeding event  []   [x]       Thromboembolic event    Duration of warfarin therapy: Indefinitely  INR Range:  2.0-3.0     Continues to miss apts and miss doses. Pt rarely offers up any information to determine cause of INR fluctuations. Ivan Knight RN fills [facility]. Will need to call and provide instructions after apts [phone]. INR is 3.8 today  Unclear etiology for fluctuating INR as making small adjustments to dose and INR significantly moving   Called and LVM for Ivan Knight with dosing instructions. Hold dose tonight then take 5 mg on Fri and 3.75mg all other days   Stop lovenox injections   Encouraged to maintain a consistency of vegetables/salads.    Recheck INR on 1 week, 1/30    **consent form signed 1/9/23    Referring PCP is Dr. Alex Pope FAX results  INR (no units)   Date Value   12/06/2022 0.95   06/17/2022 5.12 (HH)   10/05/2020 8.93 (HH)   09/19/2020 3.22 (H)     INR,(POC) (no units)   Date Value   01/17/2023 1.8   01/09/2023 1.9   12/27/2022 1.2   12/16/2022 1.7     For Pharmacy Admin Tracking Only    Intervention Detail: Dose Adjustment: 1, reason: Therapy Optimization  Total # of Interventions Recommended: 1  Total # of Interventions Accepted: 1  Time Spent (min): 15

## 2023-01-24 ENCOUNTER — HOSPITAL ENCOUNTER (OUTPATIENT)
Dept: WOUND CARE | Age: 43
Discharge: HOME | End: 2023-01-24
Payer: MEDICAID

## 2023-01-24 VITALS — DIASTOLIC BLOOD PRESSURE: 64 MMHG | RESPIRATION RATE: 16 BRPM | HEART RATE: 110 BPM | SYSTOLIC BLOOD PRESSURE: 96 MMHG

## 2023-01-24 DIAGNOSIS — L89.154 SACRAL DECUBITUS ULCER, STAGE IV (HCC): Primary | ICD-10-CM

## 2023-01-24 PROCEDURE — 99214 OFFICE O/P EST MOD 30 MIN: CPT

## 2023-01-24 PROCEDURE — 99213 OFFICE O/P EST LOW 20 MIN: CPT | Performed by: SURGERY

## 2023-01-24 RX ORDER — BACITRACIN ZINC AND POLYMYXIN B SULFATE 500; 1000 [USP'U]/G; [USP'U]/G
OINTMENT TOPICAL ONCE
OUTPATIENT
Start: 2023-01-24 | End: 2023-01-24

## 2023-01-24 RX ORDER — BETAMETHASONE DIPROPIONATE 0.05 %
OINTMENT (GRAM) TOPICAL ONCE
OUTPATIENT
Start: 2023-01-24 | End: 2023-01-24

## 2023-01-24 RX ORDER — BACITRACIN, NEOMYCIN, POLYMYXIN B 400; 3.5; 5 [USP'U]/G; MG/G; [USP'U]/G
OINTMENT TOPICAL ONCE
OUTPATIENT
Start: 2023-01-24 | End: 2023-01-24

## 2023-01-24 RX ORDER — GINSENG 100 MG
CAPSULE ORAL ONCE
OUTPATIENT
Start: 2023-01-24 | End: 2023-01-24

## 2023-01-24 RX ORDER — LIDOCAINE 40 MG/G
CREAM TOPICAL ONCE
OUTPATIENT
Start: 2023-01-24 | End: 2023-01-24

## 2023-01-24 RX ORDER — LIDOCAINE 50 MG/G
OINTMENT TOPICAL ONCE
OUTPATIENT
Start: 2023-01-24 | End: 2023-01-24

## 2023-01-24 RX ORDER — LIDOCAINE HYDROCHLORIDE 40 MG/ML
SOLUTION TOPICAL ONCE
OUTPATIENT
Start: 2023-01-24 | End: 2023-01-24

## 2023-01-24 RX ORDER — LIDOCAINE HYDROCHLORIDE 20 MG/ML
JELLY TOPICAL ONCE
OUTPATIENT
Start: 2023-01-24 | End: 2023-01-24

## 2023-01-24 RX ORDER — LIDOCAINE 40 MG/G
CREAM TOPICAL ONCE
Status: DISCONTINUED | OUTPATIENT
Start: 2023-01-24 | End: 2023-01-25 | Stop reason: HOSPADM

## 2023-01-24 RX ORDER — GENTAMICIN SULFATE 1 MG/G
OINTMENT TOPICAL ONCE
OUTPATIENT
Start: 2023-01-24 | End: 2023-01-24

## 2023-01-24 RX ORDER — CLOBETASOL PROPIONATE 0.5 MG/G
OINTMENT TOPICAL ONCE
OUTPATIENT
Start: 2023-01-24 | End: 2023-01-24

## 2023-01-24 NOTE — PROGRESS NOTES
Jocelyne Lui  Progress Note       Blas Pressley  AGE: 38 y.o. GENDER: female  : 1980  TODAY'S DATE:  2023    Subjective:     Chief Complaint   Patient presents with    Wound Check     Follow up pressure wounds         HISTORY of PRESENT ILLNESS HPI     Blas Pressley is a 38 y.o. female who presents today for wound evaluation. History of Wound: Sacral and left ischial ulcers    Wound Pain:  mild  Severity:  2 / 10   Wound Type:  pressure  Modifying Factors:  chronic pressure and decreased mobility  Associated Signs/Symptoms:  none        PAST MEDICAL HISTORY        Diagnosis Date    Anemia     DVT (deep venous thrombosis) ([facility])     MRSA (methicillin resistant staph aureus) culture positive 2017    urine    MS (multiple sclerosis) ([facility]     Pulmonary embolism ([facility])     Sacral decubitus ulcer, stage IV ([facility] 2022       PAST SURGICAL HISTORY    Past Surgical History:   Procedure Laterality Date    FINGER AMPUTATION      LEG SURGERY Left 2022    WOUND DEBRIDEMENT OF LEFT ISCHIAL ULCER performed by Latia Drew MD at [address] [facility] N/A 2022    WOUND DEBRIDEMENT OF SACRAL DECUBITUS performed by Latia Drew MD at [address] [place] [address]    History reviewed. No pertinent family history.     SOCIAL HISTORY    Social History     Tobacco Use    Smoking status: Never    Smokeless tobacco: Never   Vaping Use    Vaping Use: Never used   Substance Use Topics    Alcohol use: Not Currently     Comment: socially    Drug use: No       ALLERGIES    Allergies   Allergen Reactions    Ambien [Zolpidem Tartrate]     Ampicillin     Bactrim     Morphine     Risperidone And Related     Vicodin [Hydrocodone-Acetaminophen]     Zoloft        MEDICATIONS    Current Outpatient Medications on File Prior to Encounter   Medication Sig Dispense Refill    oxybutynin (DITROPAN) 5 MG tablet Take 5 mg by mouth 3 times daily      nitrofurantoin, macrocrystal-monohydrate, (MACROBID) 100 MG capsule Take 100 mg by mouth 2 times daily      alendronate (FOSAMAX) 70 MG tablet Take 70 mg by mouth every 7 days      MYRBETRIQ 50 MG TB24       pantoprazole (PROTONIX) 40 MG tablet Take 1 tablet by mouth daily      montelukast (SINGULAIR) 10 MG tablet Take 10 mg by mouth nightly      magnesium oxide (MAG-OX) 400 MG tablet Take 400 mg by mouth every evening      vitamin D3 (CHOLECALCIFEROL) 125 MCG (5000 UT) TABS tablet Take 1 tablet by mouth daily (Patient not taking: Reported on 12/2/2022)      simethicone (MYLICON) 80 MG chewable tablet Take 80 mg by mouth daily as needed for Flatulence      warfarin (COUMADIN) 2.5 MG tablet Take 2.5 mg by mouth See Admin Instructions Unsure of dose sequence      solifenacin (VESICARE) 5 MG tablet Take 5 mg by mouth daily (Patient not taking: Reported on 12/2/2022)      trospium (SANCTURA) 20 MG tablet Take 20 mg by mouth 2 times daily (Patient not taking: Reported on 12/2/2022)      cetirizine (ZYRTEC) 10 MG tablet Take 10 mg by mouth daily. oxyCODONE-acetaminophen (PERCOCET) 5-325 MG per tablet Take 1 tablet by mouth every 6 hours as needed. baclofen (LIORESAL) 10 MG tablet Take 10 mg by mouth 3 times daily. calcium-vitamin D (OSCAL-500) 500-200 MG-UNIT per tablet Take 1 tablet by mouth daily. ferrous sulfate 325 (65 FE) MG tablet Take 325 mg by mouth daily (with breakfast). potassium chloride SA (K-DUR;KLOR-CON) 20 MEQ tablet Take 20 mEq by mouth 2 times daily      therapeutic multivitamin-minerals (THERAGRAN-M) tablet Take 1 tablet by mouth daily. No current facility-administered medications on file prior to encounter.        REVIEW OF SYSTEMS    Constitutional: negative  Eyes: negative  Ears, nose, mouth, throat, and face: negative  Gastrointestinal: negative  Behavioral/Psych: negative      Objective:      BP 96/64   Pulse (!) 110   Resp 16     PHYSICAL EXAM    General Appearance: alert and oriented to person, place and time, well-developed and well-nourished, in no acute distress  Head: normocephalic and atraumatic  Neck: neck supple and non tender without mass, no thyromegaly or thyroid nodules, no cervical lymphadenopathy   Abdomen: soft, non-tender, non-distended, normal bowel sounds, no masses or organomegaly      Assessment:     Patient Active Problem List   Diagnosis    DVT prophylaxis    Pulmonary emboli ([address]    Long term (current) use of anticoagulants    Sacral decubitus ulcer, stage IV ([facility])    Left ischial pressure sore, stage IV ([address]       Wound 05/03/22 Sacrum #2 (Active)   Wound Image   01/24/23 0951   Wound Etiology Pressure Stage 4 01/24/23 0951   Wound Cleansed Cleansed with saline 01/24/23 0951   Dressing/Treatment Alginate with Ag;Dry dressing 11/08/22 1156   Wound Length (cm) 1 cm 01/24/23 0951   Wound Width (cm) 1.5 cm 01/24/23 0951   Wound Depth (cm) 1.5 cm 01/24/23 0951   Wound Surface Area (cm^2) 1.5 cm^2 01/24/23 0951   Change in Wound Size % (l*w) -26.05 01/24/23 0951   Wound Volume (cm^3) 2.25 cm^3 01/24/23 0951   Wound Healing % -373 01/24/23 0951   Post-Procedure Length (cm) 1 cm 11/01/22 1024   Post-Procedure Width (cm) 1 cm 11/01/22 1024   Post-Procedure Depth (cm) 0.3 cm 11/01/22 1024   Post-Procedure Surface Area (cm^2) 1 cm^2 11/01/22 1024   Post-Procedure Volume (cm^3) 0.3 cm^3 11/01/22 1024   Wound Assessment Granulation tissue 01/24/23 0951   Drainage Amount Moderate 01/24/23 0951   Drainage Description Serosanguinous 01/24/23 0951   Odor None 01/24/23 0951   Merlyn-wound Assessment Intact 01/24/23 0951   Margins Defined edges 01/24/23 0951   Wound Thickness Description not for Pressure Injury Full thickness 11/29/22 0950   Number of days: 266       Wound 05/03/22 Ischium Left #3 (Active)   Wound Image   01/24/23 0951   Wound Etiology Pressure Stage 4 01/24/23 0951   Wound Cleansed Cleansed with saline 01/24/23 0971 Dressing/Treatment Moist to dry 11/08/22 1156   Wound Length (cm) 2 cm 01/24/23 0951   Wound Width (cm) 5 cm 01/24/23 0951   Wound Depth (cm) 2.5 cm 01/24/23 0951   Wound Surface Area (cm^2) 10 cm^2 01/24/23 0951   Change in Wound Size % (l*w) 75.19 01/24/23 0951   Wound Volume (cm^3) 25 cm^3 01/24/23 0951   Wound Healing % -55 01/24/23 0951   Post-Procedure Length (cm) 1 cm 11/01/22 1024   Post-Procedure Width (cm) 2 cm 11/01/22 1024   Post-Procedure Depth (cm) 0.3 cm 11/01/22 1024   Post-Procedure Surface Area (cm^2) 2 cm^2 11/01/22 1024   Post-Procedure Volume (cm^3) 0.6 cm^3 11/01/22 1024   Distance Tunneling (cm) 4 cm 01/24/23 0951   Tunneling Position ___ O'Clock 1400 01/24/23 0951   Undermining Starts ___ O'Clock 5 01/10/23 1001   Undermining Ends___ O'Clock 1200 12/27/22 1056   Undermining Maxium Distance (cm) 5.8 12/27/22 1056   Wound Assessment Granulation tissue 01/24/23 0951   Drainage Amount Large 01/24/23 0951   Drainage Description Serosanguinous 01/24/23 0951   Odor None 01/24/23 0951   Merlyn-wound Assessment Intact 01/24/23 0951   Margins Defined edges 01/24/23 0951   Wound Thickness Description not for Pressure Injury Full thickness 01/10/23 1001   Number of days: 46     44-year-old female with multiple sclerosis who is seen in follow-up for stage IV ulcerations of the left ischial region and sacral regions. The wounds are stable. The wound VAC was placed for the first time yesterday. We again discussed keeping pressure off of the area. Plan:     Continue wound VAC at current settings. Follow-up in the wound center in 1 week.     Treatment Plan          Written Patient Discharge Instructions Given            Electronically signed by Gricelda Mendez MD on 1/24/2023 at 10:08 AM

## 2023-01-24 NOTE — PLAN OF CARE
Discharge instructions given. Patient verbalized understanding. Return to [address] [place] [address],[address] in 1 week(s).

## 2023-01-30 ENCOUNTER — TELEPHONE (OUTPATIENT)
Dept: PHARMACY | Age: 43
End: 2023-01-30

## 2023-01-30 ENCOUNTER — APPOINTMENT (OUTPATIENT)
Dept: PHARMACY | Age: 43
End: 2023-01-30
Payer: MEDICAID

## 2023-01-30 NOTE — TELEPHONE ENCOUNTER
----- Message from Ange Cardona sent at 1/30/2023  9:33 AM EST -----  Regarding: New medications  Patient has been prescribed nitrofurantoin and pyridium. Please call regarding warfarin.  [phone]

## 2023-01-30 NOTE — DISCHARGE INSTRUCTIONS
[address], [address] [facility]  Telephone: [phone]     Discharge Instructions     Important reminders:     **If you have any signs and symptoms of illness (Cough, fever, congestion, nausea, vomiting, diarrhea, etc.) please call the wound care center prior to your appointment. 1. Increase Protein intake for optimal wound healing  2. No added salt to reduce any swelling  3. If diabetic, maintain good glucose control  4. If you smoke, smoking prohibits wound healing, we ask that you refrain from smoking. 5. When taking antibiotics take the entire prescription as ordered. Do not stop taking until medication is all gone unless otherwise instructed. 6. Exercise as tolerated. 7. Keep weight off wounds and reposition every 2 hours if applicable. 8. If wound(s) is on your lower extremity, elevate legs to the level of the heart or above for 30 minutes 4-5 times a day and/or when sitting. Avoid standing for long periods of time. 9. Do not get wounds wet in bath or shower unless otherwise instructed by your physician. If your wound is on your foot or leg, you may purchase a cast bag. Please ask at the pharmacy. If Vascular testing is ordered, please call [address] [place] [address] [phone]) to schedule. Vascular tests ordered by Wound Care Physicians may take up to 2 hours to complete. Please keep that in mind when scheduling. If Vascular testing is scheduled, please bring supplies to replace your dressing after testing is done. The vascular department does not stock supplies. Wound: Sacrum, Left ischium     With each dressing change, rinse wounds with 0.9% Saline. (May use wound wash or soft contact solution. Both can be purchased at a local drug store). If unable to obtain saline, may use a gentle soap and water. Dressing care: You will need to ask your Primary Care Physician for PT/OT orders.  Sacrum, Left ischium- In clinic: wet to dry, dry dressing.NPWT. At home:Skin prep to umang-wound, drape around wound to protect good skin, bridge Left ischium and Sacrum together and bridge to hip, NPWT continuous at 120 mmHg or 125 mmHg (depending on the type of NPWT device), change Monday, Wednesday, & Friday. Canister to be changed once weekly unless full. Home care to change. If unable to place NPWT, place wet to dry, dry dressing- change daily. Increase protein, continue to drink Myron. Turn & reposition every 1-2 hours and as needed for pressure relief and comfort. Up in chair for meals only.      Home Care Agency/Facility: [facility]     Your wound-care supplies will be provided by: [employer] [address] [place] [address] f: [phone] f: [phone] p: [phone] [email or web address]    Please note, depending on your insurance coverage, you may have out-of-pocket expenses for these supplies. Someone from the company should call you to confirm your order and discuss those potential costs before they ship your products -- please anticipate that call. If your out-of-pocket cost could be substantial, Many companies have financial hardship programs for patients who qualify, so please ask about that if you might need a hand. If you have any questions about your supplies or your potential out-of-pocket costs, or if you need to place an order for a refill of supplies (typically monthly), please call the company directly.     Your  is Gabi     Follow up with Dr Huff In 1 week(s) in the wound care center.        Wound Care Center Information: Should you experience any significant changes in your wound(s) or have questions about your wound care, please contact the [facility] at [phone] Monday  - Thursday 8:00 am - 4:00 pm and Friday 8:00 am - 1:00pm. If you need help with your wound outside these hours and cannot wait until we are again available, contact your PCP or go to the hospital  emergency room.     PLEASE NOTE: IF YOU ARE UNABLE TO OBTAIN WOUND SUPPLIES, CONTINUE TO USE THE SUPPLIES YOU HAVE AVAILABLE UNTIL YOU ARE ABLE TO REACH US. IT IS MOST IMPORTANT TO KEEP THE WOUND COVERED AT ALL TIMES.     Patient Experience     Thank you for trusting us with your care.  You may receive a survey from a company called [employer] asking for your feedback.  We would appreciate it if you took a few minutes to share your experience.  Your input is very valuable to us

## 2023-01-31 ENCOUNTER — HOSPITAL ENCOUNTER (OUTPATIENT)
Dept: WOUND CARE | Age: 43
Discharge: HOME | End: 2023-01-31

## 2023-02-02 ENCOUNTER — TELEPHONE (OUTPATIENT)
Dept: PHARMACY | Age: 43
End: 2023-02-02

## 2023-02-02 NOTE — TELEPHONE ENCOUNTER
----- Message from Emily Cronin sent at 2/2/2023  7:19 AM EST -----  Regarding: Please call  Contact: patient  Patient LVCHRIS stating she was in the ED yesterday for pain and had an INR of 6.8. She states she messed up her warfarin schedule and was taking a lot of pain medicine. Please call her regarding warfarin.  [phone]

## 2023-02-02 NOTE — TELEPHONE ENCOUNTER
Returned call to patient. She states that she had been taking more tylenol than normal for stomach pains and headaches. Has been taking 2 g tylenol/day along with percocet. States that she was sleeping a lot recently with all the pain and so she took warfarin whenever she remembered. She states she does not take warfarin out of the bottle, only take warfarin that is in pillbox. Therefore could not be doubling doses if doses are filled by RN in pillbox and only taking those doses. Warfarin dosing instructions would have run out on  from AVS. Pt had RN at house check pillbox for this week to determine what dose of warfarin is in pillbox. Warfarin is not in pillbox per pt and RN. There are no warfarin tablets in any of the pillboxes. Explained it is impossible for INR to have been 6.3 and pt has not been taking warfarin for multiple days prior to INR. Unable to determine doses for patient to take as patient does not know doses she has been taking. Asked for pt to continue to hold warfarin. Bring pillboxes and warfarin to next appointment in order to assess doses and refill warfarin. Would like pt to have INR check on . She states that she is unable to make appointment as has to talk to cousin to determine ride prior to scheduling. She will call to schedule appointment. From ED visit pt was prescribed cipro x 10 days, will also increase INR.        For Pharmacy Admin Tracking Only    Intervention Detail: Adherence Monitorin and Dose Adjustment: 1, reason: Therapy Optimization  Total # of Interventions Recommended: 2  Total # of Interventions Accepted: 2  Time Spent (min): 20

## 2023-02-06 ENCOUNTER — ANTI-COAG VISIT (OUTPATIENT)
Dept: PHARMACY | Age: 43
End: 2023-02-06
Payer: MEDICAID

## 2023-02-06 DIAGNOSIS — Z29.9 DVT PROPHYLAXIS: Primary | ICD-10-CM

## 2023-02-06 DIAGNOSIS — Z79.01 LONG TERM (CURRENT) USE OF ANTICOAGULANTS: ICD-10-CM

## 2023-02-06 DIAGNOSIS — I26.09 OTHER ACUTE PULMONARY EMBOLISM WITH ACUTE COR PULMONALE (HCC): ICD-10-CM

## 2023-02-06 LAB — INTERNATIONAL NORMALIZATION RATIO, POC: 2.2

## 2023-02-06 PROCEDURE — 99212 OFFICE O/P EST SF 10 MIN: CPT

## 2023-02-06 PROCEDURE — 85610 PROTHROMBIN TIME: CPT

## 2023-02-06 NOTE — PROGRESS NOTES
Ms. Scottie Alegre is a 38 y.o. y/o female with history of DVT, PE in about 2010. She presents today for anticoagulation monitoring and adjustment. Pertinent PMH: DVT assoc with PICC line  Hx of multiple sclerosis,     Patient Reported Findings:  Yes     No  []   [x]       Patient verifies current dosing regimen as listed---> does not know dose, but takes what RN gives her but then states that she might have taken 5 mg daily but took old dose. Does not know dose, uses AVS but AVS ran out last week so she is unsure after that --> states that aide followed avs for doses --> unable to verify --> unable to state dose---> States did everything she was supposed to do --> has not taken warfarin since last week   []   [x]       S/S bleeding/bruising/swelling/SOB -denies   []   [x]       Blood in urine or stool - denies   [x]   []       Procedures scheduled in the future at this time  ---> having wound procedure on 12/6, needs to hold warfarin for 5 days and ridge with lovenox. Also having botox inj, unsure if needs to hold warfarin (last year when had botox inj held 5 days and bridged with lovenox). Pt states she has an appt today to discuss procedure since having wound procedure week prior --> pt unsure when she resumed warfarin after procedure. Called and spoke to prasanth HEWITT, she believes pt resumed warfarin on 12/6 and does not see any missing doses. Botox procedure r/s for 1/25/23 --> states she forgot about botox inj 1/25 and did not hold warfarin so will r/s   []   [x]       Missed Dose  no missed doses    []   [x]       Extra Dose - denies  [x]   []       Change in medications-using perc and tylenol prn for pain with new braces- states this will continue---> takes tylenol prn ---> no more tylenol, no other changes in meds --> started MVI flinstones (contain 60 mcg of vit k).  No abx ---> on cipro x 10 d (started 2/1)  []   [x]       Change in health/diet/appetite Normal vegetable is about twice a week and generally a low vitamin K. When she has broccoli it would be in a stir truong. May have a salad only once a month.---> had vegetables, no significant vit k ---> had greens twice since last visit. States that she had done a detox with tea (haven and turmeric tea)  had greens 2-3 times in the past week, no NVD --> had salad, greens and cabbage this week --> unsure if she had any vit k---> had vit k multiple times in the past week --> had salad yesterday ---> some green beans and zucchini---> eating more greens, no boost/ensure/V8/liver, no NVD --> returned to eating 1 spoonful of sea rivero every day, plans to continue (does not have vit k) --> no changes, no vit k   []   [x]       Change in alcohol use adamant that she did not drink alcohol while in [place] --> states now that she had one glass of alcohol in [facility]. none recently --> denies---> had a large mixed drink in last week---> denies    []   [x]       Change in activity  []   [x]       Hospital admission-   []   [x]       Emergency department visit- 10/26- ER, cipro, INR 2.0      11/21- for back pain. INR 1, was given 10 mg of warfarin that day prior to leaving. Was prescribed macrobid x 7 d.   []   [x]       Other complaints   Clinical Outcomes:  Yes     No  []   [x]       Major bleeding event  []   [x]       Thromboembolic event    Duration of warfarin therapy: Indefinitely  INR Range:  2.0-3.0     Continues to miss apts and miss doses. Pt rarely offers up any information to determine cause of INR fluctuations. Tracey Chiang, MARCELINA fills [place]. Will need to call and provide instructions after apts [phone]. Pt presents with pillbox. There are multiple day/time slots that are still full and others that aren't. Pt adamant she never misses doses but was angry when questioned why doses were still in boxes sporadically stating those dont have anything to do with warfarin and to mind business. Likely pt missing doses of warfarin and not notifying clinic.       INR is 2.2 today after not taking warfarin for 5-7 days. Also on cipro which will raise INR. Pt adamant that she doubled doses prior to 6 INR in ED but pt only took doses out of pillbox that was filled so would not be possible to double doses, overall would have still received correct doses even if timing was off. Unclear etiology for fluctuating INR as making small adjustments to dose and INR significantly moving   Filled warfarin in pillbox for 1 week. Since INR therapeutic after no doses for past 5-7 days and was supratherapeutic will significantly lower weekly dose. Decrease weekly dose to 2.5 mg on Mon, Wed and Fri and 3.75 mg all other days of the week   Encouraged to maintain a consistency of vegetables/salads. Recheck INR on 1 week, . Pt refused to RTC sooner. Explained risks with liable INR  Asked pt to bring pillbox and warfarin to next appt to fill box.       **consent form signed 23    Referring PCP is Dr. Jorge Flow results  INR (no units)   Date Value   2022 0.95   2022 5.12 (HH)   10/05/2020 8.93 (HH)   2020 3.22 (H)     INR,(POC) (no units)   Date Value   2023 3.8   2023 1.8   2023 1.9   2022 1.2     For Pharmacy Admin Tracking Only    Intervention Detail: Adherence Monitorin and Dose Adjustment: 1, reason: Therapy Optimization  Total # of Interventions Recommended: 2  Total # of Interventions Accepted: 2  Time Spent (min): 20

## 2023-02-09 NOTE — DISCHARGE INSTRUCTIONS
[address], [address] [place] [address]  Telephone: [phone]     Discharge Instructions     Important reminders:     **If you have any signs and symptoms of illness (Cough, fever, congestion, nausea, vomiting, diarrhea, etc.) please call the wound care center prior to your appointment. 1. Increase Protein intake for optimal wound healing  2. No added salt to reduce any swelling  3. If diabetic, maintain good glucose control  4. If you smoke, smoking prohibits wound healing, we ask that you refrain from smoking. 5. When taking antibiotics take the entire prescription as ordered. Do not stop taking until medication is all gone unless otherwise instructed. 6. Exercise as tolerated. 7. Keep weight off wounds and reposition every 2 hours if applicable. 8. If wound(s) is on your lower extremity, elevate legs to the level of the heart or above for 30 minutes 4-5 times a day and/or when sitting. Avoid standing for long periods of time. 9. Do not get wounds wet in bath or shower unless otherwise instructed by your physician. If your wound is on your foot or leg, you may purchase a cast bag. Please ask at the pharmacy. If Vascular testing is ordered, please call [address] [place] [address] ([phone]) to schedule. Vascular tests ordered by Wound Care Physicians may take up to 2 hours to complete. Please keep that in mind when scheduling. If Vascular testing is scheduled, please bring supplies to replace your dressing after testing is done. The vascular department does not stock supplies. Wound: Sacrum, Left ischium     With each dressing change, rinse wounds with 0.9% Saline. (May use wound wash or soft contact solution. Both can be purchased at a local drug store). If unable to obtain saline, may use a gentle soap and water. Dressing care: You will need to ask your Primary Care Physician for PT/OT orders.  Sacrum, Left ischium- In clinic: wet to dry, dry dressing. NPWT. At home:Skin prep to umang-wound, drape around wound to protect good skin, bridge Left ischium and Sacrum together and bridge to hip, NPWT continuous at 120 mmHg or 125 mmHg (depending on the type of NPWT device), change Monday, Wednesday, & Friday. Canister to be changed once weekly unless full. Home care to change. If unable to place NPWT, place wet to dry, dry dressing- change daily. Increase protein, continue to drink Myron. Turn & reposition every 1-2 hours and as needed for pressure relief and comfort. Up in chair for meals only. Home Care Agency/Facility: [email or web address]     Your wound-care supplies will be provided by: [employer] f: [phone] f: [phone] p: [phone] [email or web address]    Please note, depending on your insurance coverage, you may have out-of-pocket expenses for these supplies. Someone from the company should call you to confirm your order and discuss those potential costs before they ship your products -- please anticipate that call. If your out-of-pocket cost could be substantial, Many companies have financial hardship programs for patients who qualify, so please ask about that if you might need a hand. If you have any questions about your supplies or your potential out-of-pocket costs, or if you need to place an order for a refill of supplies (typically monthly), please call the company directly. Your  is Andrey Clarke     Follow up with Dr Karin Ibarra In 1 week(s) in the wound care center.         Wound Care Center Information: Should you experience any significant changes in your wound(s) or have questions about your wound care, please contact the [facility] at [phone] Monday  - Thursday 8:00 am - 4:00 pm and Friday 8:00 am - 1:00pm. If you need help with your wound outside these hours and cannot wait until we are again available, contact your PCP or go to the hospital emergency room. PLEASE NOTE: IF YOU ARE UNABLE TO OBTAIN WOUND SUPPLIES, CONTINUE TO USE THE SUPPLIES YOU HAVE AVAILABLE UNTIL YOU ARE ABLE TO REACH US. IT IS MOST IMPORTANT TO KEEP THE WOUND COVERED AT ALL TIMES. Patient Experience     Thank you for trusting us with your care. You may receive a survey from a company called [employer] asking for your feedback. We would appreciate it if you took a few minutes to share your experience.   Your input is very valuable to us

## 2023-02-13 ENCOUNTER — TELEPHONE (OUTPATIENT)
Dept: PHARMACY | Age: 43
End: 2023-02-13

## 2023-02-14 ENCOUNTER — ANTI-COAG VISIT (OUTPATIENT)
Dept: PHARMACY | Age: 43
End: 2023-02-14
Payer: MEDICAID

## 2023-02-14 ENCOUNTER — HOSPITAL ENCOUNTER (OUTPATIENT)
Dept: WOUND CARE | Age: 43
Discharge: HOME | End: 2023-02-14
Payer: MEDICAID

## 2023-02-14 ENCOUNTER — APPOINTMENT (OUTPATIENT)
Dept: PHARMACY | Age: 43
End: 2023-02-14
Payer: MEDICAID

## 2023-02-14 VITALS
HEART RATE: 101 BPM | TEMPERATURE: 97.1 F | SYSTOLIC BLOOD PRESSURE: 89 MMHG | RESPIRATION RATE: 16 BRPM | DIASTOLIC BLOOD PRESSURE: 64 MMHG

## 2023-02-14 DIAGNOSIS — Z79.01 LONG TERM (CURRENT) USE OF ANTICOAGULANTS: ICD-10-CM

## 2023-02-14 DIAGNOSIS — L89.154 SACRAL DECUBITUS ULCER, STAGE IV (HCC): Primary | ICD-10-CM

## 2023-02-14 DIAGNOSIS — Z29.9 DVT PROPHYLAXIS: Primary | ICD-10-CM

## 2023-02-14 DIAGNOSIS — I26.09 OTHER ACUTE PULMONARY EMBOLISM WITH ACUTE COR PULMONALE (HCC): ICD-10-CM

## 2023-02-14 LAB — INTERNATIONAL NORMALIZATION RATIO, POC: 3.5

## 2023-02-14 PROCEDURE — 85610 PROTHROMBIN TIME: CPT

## 2023-02-14 PROCEDURE — 99211 OFF/OP EST MAY X REQ PHY/QHP: CPT

## 2023-02-14 PROCEDURE — 99214 OFFICE O/P EST MOD 30 MIN: CPT

## 2023-02-14 PROCEDURE — 99213 OFFICE O/P EST LOW 20 MIN: CPT | Performed by: SURGERY

## 2023-02-14 RX ORDER — CLOBETASOL PROPIONATE 0.5 MG/G
OINTMENT TOPICAL ONCE
OUTPATIENT
Start: 2023-02-14 | End: 2023-02-14

## 2023-02-14 RX ORDER — LIDOCAINE 40 MG/G
CREAM TOPICAL ONCE
OUTPATIENT
Start: 2023-02-14 | End: 2023-02-14

## 2023-02-14 RX ORDER — MAGNESIUM HYDROXIDE 1200 MG/15ML
LIQUID ORAL
Qty: 1000 ML | Refills: 1 | Status: SHIPPED | OUTPATIENT
Start: 2023-02-14 | End: 2023-02-21

## 2023-02-14 RX ORDER — LIDOCAINE 50 MG/G
OINTMENT TOPICAL ONCE
OUTPATIENT
Start: 2023-02-14 | End: 2023-02-14

## 2023-02-14 RX ORDER — GINSENG 100 MG
CAPSULE ORAL ONCE
OUTPATIENT
Start: 2023-02-14 | End: 2023-02-14

## 2023-02-14 RX ORDER — LIDOCAINE HYDROCHLORIDE 20 MG/ML
JELLY TOPICAL ONCE
OUTPATIENT
Start: 2023-02-14 | End: 2023-02-14

## 2023-02-14 RX ORDER — BACITRACIN, NEOMYCIN, POLYMYXIN B 400; 3.5; 5 [USP'U]/G; MG/G; [USP'U]/G
OINTMENT TOPICAL ONCE
OUTPATIENT
Start: 2023-02-14 | End: 2023-02-14

## 2023-02-14 RX ORDER — LIDOCAINE HYDROCHLORIDE 40 MG/ML
SOLUTION TOPICAL ONCE
OUTPATIENT
Start: 2023-02-14 | End: 2023-02-14

## 2023-02-14 RX ORDER — BACITRACIN ZINC AND POLYMYXIN B SULFATE 500; 1000 [USP'U]/G; [USP'U]/G
OINTMENT TOPICAL ONCE
OUTPATIENT
Start: 2023-02-14 | End: 2023-02-14

## 2023-02-14 RX ORDER — LIDOCAINE 40 MG/G
CREAM TOPICAL ONCE
Status: DISCONTINUED | OUTPATIENT
Start: 2023-02-14 | End: 2023-02-15 | Stop reason: HOSPADM

## 2023-02-14 RX ORDER — BETAMETHASONE DIPROPIONATE 0.05 %
OINTMENT (GRAM) TOPICAL ONCE
OUTPATIENT
Start: 2023-02-14 | End: 2023-02-14

## 2023-02-14 RX ORDER — GENTAMICIN SULFATE 1 MG/G
OINTMENT TOPICAL ONCE
OUTPATIENT
Start: 2023-02-14 | End: 2023-02-14

## 2023-02-14 NOTE — PROGRESS NOTES
Ms. Gabby Romero is a 38 y.o. y/o female with history of DVT, PE in about 2010. She presents today for anticoagulation monitoring and adjustment. Pertinent PMH: DVT assoc with PICC line  Hx of multiple sclerosis,     Patient Reported Findings:  Yes     No  []   [x]       Patient verifies current dosing regimen as listed---> does not know dose, but takes what RN gives her but then states that she might have taken 5 mg daily but took old dose. Does not know dose, uses AVS but AVS ran out last week so she is unsure after that --> states that aide followed avs for doses --> unable to verify --> unable to state dose---> States did everything she was supposed to do --> has not taken warfarin since last week   []   [x]       S/S bleeding/bruising/swelling/SOB -denies   []   [x]       Blood in urine or stool - denies   [x]   []       Procedures scheduled in the future at this time  ---> having wound procedure on 12/6, needs to hold warfarin for 5 days and ridge with lovenox. Also having botox inj, unsure if needs to hold warfarin (last year when had botox inj held 5 days and bridged with lovenox). Pt states she has an appt today to discuss procedure since having wound procedure week prior --> pt unsure when she resumed warfarin after procedure. Called and spoke to prasanth HEWITT, she believes pt resumed warfarin on 12/6 and does not see any missing doses. Botox procedure r/s for 1/25/23 --> states she forgot about botox inj 1/25 and did not hold warfarin so will r/s   []   [x]       Missed Dose  - denies   []   [x]       Extra Dose - denies  [x]   []       Change in medications-using perc and tylenol prn for pain with new braces- states this will continue---> takes tylenol prn ---> no more tylenol, no other changes in meds --> started MVI flinstones (contain 60 mcg of vit k).  No abx ---> on cipro x 10 d (started 2/1) --> taking 1g tylenol daily for the last week, still taking cipro d/t missed doses, taking sea rivero supplement  []   [x]       Change in health/diet/appetite Normal vegetable is about twice a week and generally a low vitamin K. When she has broccoli it would be in a stir truong. May have a salad only once a month.---> had vegetables, no significant vit k ---> had greens twice since last visit. States that she had done a detox with tea (haven and turmeric tea)  had greens 2-3 times in the past week, no NVD --> had salad, greens and cabbage this week --> unsure if she had any vit k---> had vit k multiple times in the past week --> had salad yesterday ---> some green beans and zucchini---> eating more greens, no boost/ensure/V8/liver, no NVD --> returned to eating 1 spoonful of sea rivero every day, plans to continue (does not have vit k) --> no changes, no vit k --> lower appetite overall d/t stomach issues, lots of diarrhea per patient, no NV. Getting better now. []   [x]       Change in alcohol use adamant that she did not drink alcohol while in [place] --> states now that she had one glass of alcohol in [facility]. none recently --> denies---> had a large mixed drink in last week---> denies    []   [x]       Change in activity  []   [x]       Hospital admission-   []   [x]       Emergency department visit- 10/26- ER, cipro, INR 2.0      11/21- for back pain. INR 1, was given 10 mg of warfarin that day prior to leaving. Was prescribed macrobid x 7 d.   []   [x]       Other complaints   Clinical Outcomes:  Yes     No  []   [x]       Major bleeding event  []   [x]       Thromboembolic event    Duration of warfarin therapy: Indefinitely  INR Range:  2.0-3.0     Continues to miss apts and miss doses. Pt rarely offers up any information to determine cause of INR fluctuations. Lalitha Das, RN fills [employer]. Will need to call and provide instructions after apts [phone]. Pt presents with pillbox. There are multiple day/time slots that are still full and others that aren't.  Pt adamant she never misses doses but was angry when questioned why doses were still in boxes sporadically stating those dont have anything to do with warfarin and to mind business. Likely pt missing doses of warfarin and not notifying clinic. INR is 3.5 today after dose decrease. Patient still taking cipro d/t missed doses and was taking 1g tylenol daily for stomach issues. Diarrhea most days d/t stomach issues which patient says have mostly resolved. INR should trend down as patient stops tylenol and diarrhea subsides. Unclear etiology for fluctuating INR as making small adjustments to dose and INR significantly moving     Filled warfarin in pillbox for 1 week. --> did not have pill box at appointment. Will maintain dose for now with reduced dose today. Take 1.25mg tonight then continue to take 2.5 mg on Mon, Wed and Fri and 3.75 mg all other days of the week  Encouraged to maintain a consistency of vegetables/salads. Recheck INR on 1 week, 2/21. Asked pt to bring pillbox and warfarin to next appt to fill box.       **consent form signed 1/9/23    Referring PCP is Dr. Lindsay Matias results  INR (no units)   Date Value   12/06/2022 0.95   06/17/2022 5.12 (HH)   10/05/2020 8.93 (HH)   09/19/2020 3.22 (H)     INR,(POC) (no units)   Date Value   02/06/2023 2.2   01/23/2023 3.8   01/17/2023 1.8   01/09/2023 1.9     For Pharmacy Admin Tracking Only    Intervention Detail: Dose Adjustment: 1, reason: Therapy Optimization  Total # of Interventions Recommended: 1  Total # of Interventions Accepted: 1  Time Spent (min): 20

## 2023-02-14 NOTE — PROGRESS NOTES
Jocelyne Camilo  Progress Note       Xochilt Chamberlain  AGE: 38 y.o. GENDER: female  : 1980  TODAY'S DATE:  2023    Subjective:     Chief Complaint   Patient presents with    Wound Check     buttocks         HISTORY of PRESENT ILLNESS HPI     Xochilt Chamberlain is a 38 y.o. female who presents today for wound evaluation. History of Wound: pressure ulcers     Wound Pain:  mild  Severity:  3 / 10   Wound Type:  pressure  Modifying Factors:  chronic pressure and decreased mobility  Associated Signs/Symptoms:  none        PAST MEDICAL HISTORY        Diagnosis Date    Anemia     DVT (deep venous thrombosis) ([facility]     MRSA (methicillin resistant staph aureus) culture positive 2017    urine    MS (multiple sclerosis) ([facility]     Pulmonary embolism ([facility])     Sacral decubitus ulcer, stage IV ([facility] 2022       PAST SURGICAL HISTORY    Past Surgical History:   Procedure Laterality Date    FINGER AMPUTATION      LEG SURGERY Left 2022    WOUND DEBRIDEMENT OF LEFT ISCHIAL ULCER performed by Yelena Thao MD at [facility] N/A 2022    WOUND DEBRIDEMENT OF SACRAL DECUBITUS performed by Yelena Thao MD at [facility]    TUNNELED VENOUS PORT PLACEMENT         FAMILY HISTORY    No family history on file.     SOCIAL HISTORY    Social History     Tobacco Use    Smoking status: Never    Smokeless tobacco: Never   Vaping Use    Vaping Use: Never used   Substance Use Topics    Alcohol use: Not Currently     Comment: socially    Drug use: No       ALLERGIES    Allergies   Allergen Reactions    Ambien [Zolpidem Tartrate]     Ampicillin     Bactrim     Morphine     Risperidone And Related     Vicodin [Hydrocodone-Acetaminophen]     Zoloft        MEDICATIONS    Current Outpatient Medications on File Prior to Encounter   Medication Sig Dispense Refill    oxybutynin (DITROPAN) 5 MG tablet Take 5 mg by mouth 3 times daily      nitrofurantoin, macrocrystal-monohydrate, (MACROBID) 100 MG capsule Take 100 mg by mouth 2 times daily      alendronate (FOSAMAX) 70 MG tablet Take 70 mg by mouth every 7 days      MYRBETRIQ 50 MG TB24       pantoprazole (PROTONIX) 40 MG tablet Take 1 tablet by mouth daily      montelukast (SINGULAIR) 10 MG tablet Take 10 mg by mouth nightly      magnesium oxide (MAG-OX) 400 MG tablet Take 400 mg by mouth every evening      vitamin D3 (CHOLECALCIFEROL) 125 MCG (5000 UT) TABS tablet Take 1 tablet by mouth daily (Patient not taking: Reported on 12/2/2022)      simethicone (MYLICON) 80 MG chewable tablet Take 80 mg by mouth daily as needed for Flatulence      warfarin (COUMADIN) 2.5 MG tablet Take 2.5 mg by mouth See Admin Instructions Unsure of dose sequence      solifenacin (VESICARE) 5 MG tablet Take 5 mg by mouth daily (Patient not taking: Reported on 12/2/2022)      trospium (SANCTURA) 20 MG tablet Take 20 mg by mouth 2 times daily (Patient not taking: Reported on 12/2/2022)      cetirizine (ZYRTEC) 10 MG tablet Take 10 mg by mouth daily. oxyCODONE-acetaminophen (PERCOCET) 5-325 MG per tablet Take 1 tablet by mouth every 6 hours as needed. baclofen (LIORESAL) 10 MG tablet Take 10 mg by mouth 3 times daily. calcium-vitamin D (OSCAL-500) 500-200 MG-UNIT per tablet Take 1 tablet by mouth daily. ferrous sulfate 325 (65 FE) MG tablet Take 325 mg by mouth daily (with breakfast). potassium chloride SA (K-DUR;KLOR-CON) 20 MEQ tablet Take 20 mEq by mouth 2 times daily      therapeutic multivitamin-minerals (THERAGRAN-M) tablet Take 1 tablet by mouth daily. No current facility-administered medications on file prior to encounter.        REVIEW OF SYSTEMS    Constitutional: negative  Eyes: negative  Ears, nose, mouth, throat, and face: negative  Gastrointestinal: negative  Behavioral/Psych: negative      Objective:      BP 89/64   Pulse (!) 101   Temp 97.1 °F (36.2 °C) (Infrared)   Resp 16 PHYSICAL EXAM    General Appearance: alert and oriented to person, place and time, well-developed and well-nourished, in no acute distress  Head: normocephalic and atraumatic  Neck: neck supple and non tender without mass, no thyromegaly or thyroid nodules, no cervical lymphadenopathy   Abdomen: soft, non-tender, non-distended, normal bowel sounds, no masses or organomegaly  Ulcers clean    Assessment:     Patient Active Problem List   Diagnosis    DVT prophylaxis    Pulmonary emboli ([address]    Long term (current) use of anticoagulants    Sacral decubitus ulcer, stage IV (HCC)    Left ischial pressure sore, stage IV ([address]       Wound 05/03/22 Sacrum #2 (Active)   Wound Image   01/24/23 0951   Wound Etiology Pressure Stage 4 02/14/23 0947   Wound Cleansed Cleansed with saline 02/14/23 0947   Dressing/Treatment Moist to dry 01/24/23 0951   Wound Length (cm) 1 cm 02/14/23 0947   Wound Width (cm) 1.5 cm 02/14/23 0947   Wound Depth (cm) 1.5 cm 02/14/23 0947   Wound Surface Area (cm^2) 1.5 cm^2 02/14/23 0947   Change in Wound Size % (l*w) -26.05 02/14/23 0947   Wound Volume (cm^3) 2.25 cm^3 02/14/23 0947   Wound Healing % -373 02/14/23 0947   Post-Procedure Length (cm) 1 cm 11/01/22 1024   Post-Procedure Width (cm) 1 cm 11/01/22 1024   Post-Procedure Depth (cm) 0.3 cm 11/01/22 1024   Post-Procedure Surface Area (cm^2) 1 cm^2 11/01/22 1024   Post-Procedure Volume (cm^3) 0.3 cm^3 11/01/22 1024   Wound Assessment Granulation tissue;Pink/red 02/14/23 0947   Drainage Amount Moderate 02/14/23 0947   Drainage Description Serosanguinous 02/14/23 0947   Odor None 02/14/23 0947   Merlyn-wound Assessment Intact 02/14/23 0947   Margins Defined edges 02/14/23 0947   Wound Thickness Description not for Pressure Injury Full thickness 11/29/22 0950   Number of days: 287       Wound 05/03/22 Ischium Left #3 (Active)   Wound Image   01/24/23 0951   Wound Etiology Pressure Stage 4 02/14/23 0947   Wound Cleansed Cleansed with saline 02/14/23 2321   Dressing/Treatment Moist to dry 01/24/23 0951   Wound Length (cm) 2 cm 02/14/23 0947   Wound Width (cm) 5 cm 02/14/23 0947   Wound Depth (cm) 2 cm 02/14/23 0947   Wound Surface Area (cm^2) 10 cm^2 02/14/23 0947   Change in Wound Size % (l*w) 75.19 02/14/23 0947   Wound Volume (cm^3) 20 cm^3 02/14/23 0947   Wound Healing % -24 02/14/23 0947   Post-Procedure Length (cm) 1 cm 11/01/22 1024   Post-Procedure Width (cm) 2 cm 11/01/22 1024   Post-Procedure Depth (cm) 0.3 cm 11/01/22 1024   Post-Procedure Surface Area (cm^2) 2 cm^2 11/01/22 1024   Post-Procedure Volume (cm^3) 0.6 cm^3 11/01/22 1024   Distance Tunneling (cm) 3 cm 02/14/23 0947   Tunneling Position ___ O'Clock 1500 02/14/23 0947   Undermining Starts ___ O'Clock 1700 02/14/23 0947   Undermining Ends___ O'Clock 1200 12/27/22 1056   Undermining Maxium Distance (cm) 5.8 12/27/22 1056   Wound Assessment Pink/red 02/14/23 0947   Drainage Amount Large 02/14/23 0947   Drainage Description Serosanguinous 02/14/23 0947   Odor None 02/14/23 0947   Merlyn-wound Assessment Intact 02/14/23 0947   Margins Defined edges 02/14/23 0947   Wound Thickness Description not for Pressure Injury Full thickness 01/10/23 1001   Number of days: 12     44-year-old female with multiple sclerosis who is seen in follow-up for a stage IV ulceration of the sacral and left ischial regions. The ulcers are clean. There have been difficulties with the wound VAC. Because of this, the wound VAC has not been on for the majority of the time. We will assist the patient work through social issues and issues with home care in order for her to be compliant with a wound VAC. The wound VAC is felt to be the best treatment at this time. Plan:     Follow-up in 1 week in the wound center.   Treatment Plan          Written Patient Discharge Instructions Given            Electronically signed by Chidi Carpio MD on 2/14/2023 at 10:21 AM

## 2023-02-14 NOTE — PLAN OF CARE
Called Nathan at [facility] to confirm visits are being done and wound vac is being placed. 1/23 - Initial visit  1/25 - Pt cancelled visit  1/27 - Pt cancelled visit  2/1 - Pt cancelled visit at [facility]  2/3 - Pt seen. No confirmation is NPWT placed  2/6 - Visit cancelled per pt - Pt reassigned to another nurse  2/8 - Seen by RN  2/10 - Pt cancelled visit  2/13 - Nurse was not aware she needed to see pt  2/15 - Visit is scheduled    Pt informed me at today's visit that the nurse told her they do not have staffing to see her 2 times a week. I confirmed with Wilber Serna (intake at [facility]), that staffing is not an issue.  He will have see pt tomorrow to assist with noncompliance

## 2023-02-14 NOTE — PLAN OF CARE
Discharge instructions given. Patient verbalized understanding. Return to [facility] in 2 week(s).   Called/faxed orders to  [facility]

## 2023-02-22 ENCOUNTER — TELEPHONE (OUTPATIENT)
Dept: PHARMACY | Age: 43
End: 2023-02-22

## 2023-02-27 NOTE — DISCHARGE INSTRUCTIONS
[address], [address]  Telephone: [phone]     Discharge Instructions     Important reminders:     **If you have any signs and symptoms of illness (Cough, fever, congestion, nausea, vomiting, diarrhea, etc.) please call the wound care center prior to your appointment. 1. Increase Protein intake for optimal wound healing  2. No added salt to reduce any swelling  3. If diabetic, maintain good glucose control  4. If you smoke, smoking prohibits wound healing, we ask that you refrain from smoking. 5. When taking antibiotics take the entire prescription as ordered. Do not stop taking until medication is all gone unless otherwise instructed. 6. Exercise as tolerated. 7. Keep weight off wounds and reposition every 2 hours if applicable. 8. If wound(s) is on your lower extremity, elevate legs to the level of the heart or above for 30 minutes 4-5 times a day and/or when sitting. Avoid standing for long periods of time. 9. Do not get wounds wet in bath or shower unless otherwise instructed by your physician. If your wound is on your foot or leg, you may purchase a cast bag. Please ask at the pharmacy. If Vascular testing is ordered, please call [address] [place] [address] [phone]) to schedule. Vascular tests ordered by Wound Care Physicians may take up to 2 hours to complete. Please keep that in mind when scheduling. If Vascular testing is scheduled, please bring supplies to replace your dressing after testing is done. The vascular department does not stock supplies. Wound: Sacrum, Left ischium     With each dressing change, rinse wounds with 0.9% Saline. (May use wound wash or soft contact solution. Both can be purchased at a local drug store). If unable to obtain saline, may use a gentle soap and water. Dressing care: Discontinue the NPWT- you will need to contact the company to return the NPWT.  You will need to ask your Primary Care Physician for PT/OT orders. Sacrum, Left ischium- wet to dry, dry dressing- change daily. Increase protein, continue to drink Myron. Turn & reposition every 1-2 hours and as needed for pressure relief and comfort. Up in chair for meals only. Home Care Agency/Facility: [facility]     Your wound-care supplies will be provided by: [address] f: [phone] f: [phone] p: [phone] [email or web address]    Please note, depending on your insurance coverage, you may have out-of-pocket expenses for these supplies. Someone from the company should call you to confirm your order and discuss those potential costs before they ship your products -- please anticipate that call. If your out-of-pocket cost could be substantial, Many companies have financial hardship programs for patients who qualify, so please ask about that if you might need a hand. If you have any questions about your supplies or your potential out-of-pocket costs, or if you need to place an order for a refill of supplies (typically monthly), please call the company directly. Your  is Erick Morataya     Follow up with Dr Isis Irene In 2 week(s) in the wound care center. Wound Care Center Information: Should you experience any significant changes in your wound(s) or have questions about your wound care, [facility] at [phone] Monday  - Thursday 8:00 am - 4:00 pm and Friday 8:00 am - 1:00pm. If you need help with your wound outside these hours and cannot wait until we are again available, contact your PCP or go to the hospital emergency room. PLEASE NOTE: IF YOU ARE UNABLE TO OBTAIN WOUND SUPPLIES, CONTINUE TO USE THE SUPPLIES YOU HAVE AVAILABLE UNTIL YOU ARE ABLE TO REACH US. IT IS MOST IMPORTANT TO KEEP THE WOUND COVERED AT ALL TIMES. Patient Experience     Thank you for trusting us with your care.   You may receive a survey from a company called [employer] asking for your feedback. We would appreciate it if you took a few minutes to share your experience.   Your input is very valuable to us

## 2023-02-28 ENCOUNTER — ANTI-COAG VISIT (OUTPATIENT)
Dept: PHARMACY | Age: 43
End: 2023-02-28
Payer: MEDICAID

## 2023-02-28 ENCOUNTER — HOSPITAL ENCOUNTER (OUTPATIENT)
Dept: WOUND CARE | Age: 43
Discharge: HOME | End: 2023-02-28
Payer: MEDICAID

## 2023-02-28 VITALS
TEMPERATURE: 96.4 F | WEIGHT: 91.7 LBS | BODY MASS INDEX: 16.77 KG/M2 | DIASTOLIC BLOOD PRESSURE: 73 MMHG | HEART RATE: 107 BPM | SYSTOLIC BLOOD PRESSURE: 103 MMHG

## 2023-02-28 DIAGNOSIS — Z79.01 LONG TERM (CURRENT) USE OF ANTICOAGULANTS: ICD-10-CM

## 2023-02-28 DIAGNOSIS — L89.154 SACRAL DECUBITUS ULCER, STAGE IV (HCC): Primary | ICD-10-CM

## 2023-02-28 DIAGNOSIS — Z29.9 DVT PROPHYLAXIS: Primary | ICD-10-CM

## 2023-02-28 DIAGNOSIS — I26.09 PULMONARY EMBOLISM WITH ACUTE COR PULMONALE, UNSPECIFIED CHRONICITY, UNSPECIFIED PULMONARY EMBOLISM TYPE (HCC): ICD-10-CM

## 2023-02-28 LAB — INTERNATIONAL NORMALIZATION RATIO, POC: 1.1

## 2023-02-28 PROCEDURE — 99212 OFFICE O/P EST SF 10 MIN: CPT

## 2023-02-28 PROCEDURE — 99213 OFFICE O/P EST LOW 20 MIN: CPT | Performed by: SURGERY

## 2023-02-28 PROCEDURE — 99214 OFFICE O/P EST MOD 30 MIN: CPT

## 2023-02-28 PROCEDURE — 85610 PROTHROMBIN TIME: CPT

## 2023-02-28 RX ORDER — GENTAMICIN SULFATE 1 MG/G
OINTMENT TOPICAL ONCE
OUTPATIENT
Start: 2023-02-28 | End: 2023-02-28

## 2023-02-28 RX ORDER — GINSENG 100 MG
CAPSULE ORAL ONCE
OUTPATIENT
Start: 2023-02-28 | End: 2023-02-28

## 2023-02-28 RX ORDER — LIDOCAINE HYDROCHLORIDE 40 MG/ML
SOLUTION TOPICAL ONCE
OUTPATIENT
Start: 2023-02-28 | End: 2023-02-28

## 2023-02-28 RX ORDER — LIDOCAINE HYDROCHLORIDE 20 MG/ML
JELLY TOPICAL ONCE
OUTPATIENT
Start: 2023-02-28 | End: 2023-02-28

## 2023-02-28 RX ORDER — CLOBETASOL PROPIONATE 0.5 MG/G
OINTMENT TOPICAL ONCE
OUTPATIENT
Start: 2023-02-28 | End: 2023-02-28

## 2023-02-28 RX ORDER — BACITRACIN, NEOMYCIN, POLYMYXIN B 400; 3.5; 5 [USP'U]/G; MG/G; [USP'U]/G
OINTMENT TOPICAL ONCE
OUTPATIENT
Start: 2023-02-28 | End: 2023-02-28

## 2023-02-28 RX ORDER — LIDOCAINE 40 MG/G
CREAM TOPICAL ONCE
OUTPATIENT
Start: 2023-02-28 | End: 2023-02-28

## 2023-02-28 RX ORDER — BACITRACIN ZINC AND POLYMYXIN B SULFATE 500; 1000 [USP'U]/G; [USP'U]/G
OINTMENT TOPICAL ONCE
OUTPATIENT
Start: 2023-02-28 | End: 2023-02-28

## 2023-02-28 RX ORDER — BETAMETHASONE DIPROPIONATE 0.05 %
OINTMENT (GRAM) TOPICAL ONCE
OUTPATIENT
Start: 2023-02-28 | End: 2023-02-28

## 2023-02-28 RX ORDER — LIDOCAINE 50 MG/G
OINTMENT TOPICAL ONCE
OUTPATIENT
Start: 2023-02-28 | End: 2023-02-28

## 2023-02-28 NOTE — PROGRESS NOTES
Ms. Sang Gasca is a 38 y.o. y/o female with history of DVT, PE in about 2010. She presents today for anticoagulation monitoring and adjustment. Pertinent PMH: DVT assoc with PICC line  Hx of multiple sclerosis,     Patient Reported Findings:  Yes     No  []   [x]       Patient verifies current dosing regimen as listed---> does not know dose, but takes what RN gives her but then states that she might have taken 5 mg daily but took old dose. Does not know dose, uses AVS but AVS ran out last week so she is unsure after that --> states that aide followed avs for doses --> unable to verify --> unable to state dose---> States did everything she was supposed to do --> has not taken warfarin since last week   []   [x]       S/S bleeding/bruising/swelling/SOB -denies   []   [x]       Blood in urine or stool - denies   [x]   []       Procedures scheduled in the future at this time  ---> having wound procedure on 12/6, needs to hold warfarin for 5 days and ridge with lovenox. Also having botox inj, unsure if needs to hold warfarin (last year when had botox inj held 5 days and bridged with lovenox). Pt states she has an appt today to discuss procedure since having wound procedure week prior --> pt unsure when she resumed warfarin after procedure. Called and spoke to prasanth HEWITT, she believes pt resumed warfarin on 12/6 and does not see any missing doses. Botox procedure r/s for 1/25/23 --> states she forgot about botox inj 1/25 and did not hold warfarin so will r/s   []   [x]       Missed Dose  - denies   []   [x]       Extra Dose - denies  [x]   []       Change in medications-using perc and tylenol prn for pain with new braces- states this will continue---> takes tylenol prn ---> no more tylenol, no other changes in meds --> started MVI flinstones (contain 60 mcg of vit k).  No abx ---> on cipro x 10 d (started 2/1) --> taking 1g tylenol daily for the last week, still taking cipro d/t missed doses, taking sea rivero supplement  []   [x]       Change in health/diet/appetite Normal vegetable is about twice a week and generally a low vitamin K. When she has broccoli it would be in a stir truong. May have a salad only once a month.---> had vegetables, no significant vit k ---> had greens twice since last visit. States that she had done a detox with tea (haven and turmeric tea)  had greens 2-3 times in the past week, no NVD --> had salad, greens and cabbage this week --> unsure if she had any vit k---> had vit k multiple times in the past week --> had salad yesterday ---> some green beans and zucchini---> eating more greens, no boost/ensure/V8/liver, no NVD --> returned to eating 1 spoonful of sea rivero every day, plans to continue (does not have vit k) --> no changes, no vit k --> lower appetite overall d/t stomach issues, lots of diarrhea per patient, no NV. Getting better now. []   [x]       Change in alcohol use adamant that she did not drink alcohol while in [place] --> states now that she had one glass of alcohol in [facility]. none recently --> denies---> had a large mixed drink in last week---> denies    []   [x]       Change in activity  []   [x]       Hospital admission-   []   [x]       Emergency department visit- 10/26- ER, cipro, INR 2.0      11/21- for back pain. INR 1, was given 10 mg of warfarin that day prior to leaving. Was prescribed macrobid x 7 d.   []   [x]       Other complaints   Clinical Outcomes:  Yes     No  []   [x]       Major bleeding event  []   [x]       Thromboembolic event    Duration of warfarin therapy: Indefinitely  INR Range:  2.0-3.0     Continues to miss apts and miss doses. Pt rarely offers up any information to determine cause of INR fluctuations. Belem Baldwin RN fills "[employer]. Will need to call and provide instructions after apts [phone]. Pt presents with pillbox. There are multiple day/time slots that are still full and others that aren't.  Pt adamant she never misses doses but was angry when questioned why doses were still in boxes sporadically stating those dont have anything to do with warfarin and to mind business. Likely pt missing doses of warfarin and not notifying clinic.  ---> patient did not bring pillbox. -Called Cynthia to discuss, Usama Clinton states that patient states that the clinic was supposed to fill it and Usama Clinton wasn't supposed to touch it. Usama Clinton tried to fill it on  after missing her apt but she states that the patient was supposed to call us since she had missed the apt and was not given instructions dt missing the apt and not knowing what the patient should take since her numbers have been fluctuating. Usama Clinton states that patient told her that she was not supposed to take anything until RTC the following week. Explained that in the future, she can call and speak with us so that we can give instructions so patient is taking warfarin and not without it. INR is 1.1 today dt being out of warfarin for a week. States the RN did not fill pillbox last week since she missed her apt and didn't have new dosing. Explained to have her call us next time. Was due back . RN coming out today to fill. Will discuss with Usama Clinton (see above)  Take 5mg tonight and tomorrow then continue to take 2.5 mg on Mon, Wed and Fri and 3.75 mg all other days of the week. Encouraged to maintain a consistency of vegetables/salads. Recheck INR on 1 week, 3/7  Asked pt to bring pillbox and warfarin to next appt to fill box.       **consent form signed 23    Referring PCP is Dr. aDvida Albert results  INR (no units)   Date Value   2022 0.95   2022 5.12 (HH)   10/05/2020 8.93 (HH)   2020 3.22 (H)     INR,(POC) (no units)   Date Value   2023 1.1   2023 3.5   2023 2.2   2023 3.8     For Pharmacy Admin Tracking Only    Intervention Detail: Adherence Monitorin and Dose Adjustment: 1, reason: Therapy Optimization  Total # of Interventions Recommended: 2  Total # of Interventions Accepted: 2  Time Spent (min): 20

## 2023-02-28 NOTE — PROGRESS NOTES
Jocelyne Lui  Progress Note       Krystin Burnham  AGE: 38 y.o. GENDER: female  : 1980  TODAY'S DATE:  2023    Subjective:     Chief Complaint   Patient presents with    Wound Check     Left ischium  F/U         HISTORY of PRESENT ILLNESS HPI     Krystin Burnham is a 38 y.o. female who presents today for wound evaluation. History of Wound: Sacral and left ischial ulcer    Wound Pain:  mild  Severity:  2 / 10   Wound Type:  pressure  Modifying Factors:  chronic pressure and decreased mobility  Associated Signs/Symptoms:  none        PAST MEDICAL HISTORY        Diagnosis Date    Anemia     DVT (deep venous thrombosis) ([facility])     MRSA (methicillin resistant staph aureus) culture positive 2017    urine    MS (multiple sclerosis) ([facility]     Pulmonary embolism ([facility])     Sacral decubitus ulcer, stage IV ([facility] 2022       PAST SURGICAL HISTORY    Past Surgical History:   Procedure Laterality Date    FINGER AMPUTATION      LEG SURGERY Left 2022    WOUND DEBRIDEMENT OF LEFT ISCHIAL ULCER performed by Reza Thomas MD at [facility] N/A 2022    WOUND DEBRIDEMENT OF SACRAL DECUBITUS performed by Reza Thomas MD at [address]    History reviewed. No pertinent family history.     SOCIAL HISTORY    Social History     Tobacco Use    Smoking status: Never    Smokeless tobacco: Never   Vaping Use    Vaping Use: Never used   Substance Use Topics    Alcohol use: Not Currently     Comment: socially    Drug use: No       ALLERGIES    Allergies   Allergen Reactions    Ambien [Zolpidem Tartrate]     Ampicillin     Bactrim     Morphine     Risperidone And Related     Vicodin [Hydrocodone-Acetaminophen]     Zoloft        MEDICATIONS    Current Outpatient Medications on File Prior to Encounter   Medication Sig Dispense Refill    oxybutynin (DITROPAN) 5 MG tablet Take 5 mg by mouth 3 times daily nitrofurantoin, macrocrystal-monohydrate, (MACROBID) 100 MG capsule Take 100 mg by mouth 2 times daily      alendronate (FOSAMAX) 70 MG tablet Take 70 mg by mouth every 7 days      MYRBETRIQ 50 MG TB24       pantoprazole (PROTONIX) 40 MG tablet Take 1 tablet by mouth daily      montelukast (SINGULAIR) 10 MG tablet Take 10 mg by mouth nightly      magnesium oxide (MAG-OX) 400 MG tablet Take 400 mg by mouth every evening      vitamin D3 (CHOLECALCIFEROL) 125 MCG (5000 UT) TABS tablet Take 1 tablet by mouth daily (Patient not taking: Reported on 12/2/2022)      simethicone (MYLICON) 80 MG chewable tablet Take 80 mg by mouth daily as needed for Flatulence      warfarin (COUMADIN) 2.5 MG tablet Take 2.5 mg by mouth See Admin Instructions Unsure of dose sequence      solifenacin (VESICARE) 5 MG tablet Take 5 mg by mouth daily (Patient not taking: Reported on 12/2/2022)      trospium (SANCTURA) 20 MG tablet Take 20 mg by mouth 2 times daily (Patient not taking: Reported on 12/2/2022)      cetirizine (ZYRTEC) 10 MG tablet Take 10 mg by mouth daily. oxyCODONE-acetaminophen (PERCOCET) 5-325 MG per tablet Take 1 tablet by mouth every 6 hours as needed. baclofen (LIORESAL) 10 MG tablet Take 10 mg by mouth 3 times daily. calcium-vitamin D (OSCAL-500) 500-200 MG-UNIT per tablet Take 1 tablet by mouth daily. ferrous sulfate 325 (65 FE) MG tablet Take 325 mg by mouth daily (with breakfast). potassium chloride SA (K-DUR;KLOR-CON) 20 MEQ tablet Take 20 mEq by mouth 2 times daily      therapeutic multivitamin-minerals (THERAGRAN-M) tablet Take 1 tablet by mouth daily. No current facility-administered medications on file prior to encounter.        REVIEW OF SYSTEMS    Constitutional: negative  Eyes: negative  Ears, nose, mouth, throat, and face: negative  Genitourinary:negative  Behavioral/Psych: negative      Objective:      /73   Pulse (!) 107   Temp (!) 96.4 °F (35.8 °C) (Tympanic) Wt 91 lb 11.2 oz (41.6 kg)   BMI 16.77 kg/m²     PHYSICAL EXAM    General Appearance: alert and oriented to person, place and time, well-developed and well-nourished, in no acute distress  Head: normocephalic and atraumatic  Neck: neck supple and non tender without mass, no thyromegaly or thyroid nodules, no cervical lymphadenopathy   Abdomen: soft, non-tender, non-distended, normal bowel sounds, no masses or organomegaly      Assessment:     Patient Active Problem List   Diagnosis    DVT prophylaxis    Pulmonary emboli ([address]    Long term (current) use of anticoagulants    Sacral decubitus ulcer, stage IV (HCC)    Left ischial pressure sore, stage IV ([address]       Wound 05/03/22 Sacrum #2 (Active)   Wound Image   01/24/23 0951   Wound Etiology Pressure Stage 4 02/28/23 1001   Wound Cleansed Cleansed with saline 02/28/23 1001   Dressing/Treatment Moist to dry 01/24/23 0951   Wound Length (cm) 1.2 cm 02/28/23 1001   Wound Width (cm) 0.5 cm 02/28/23 1001   Wound Depth (cm) 1.8 cm 02/28/23 1001   Wound Surface Area (cm^2) 0.6 cm^2 02/28/23 1001   Change in Wound Size % (l*w) 49.58 02/28/23 1001   Wound Volume (cm^3) 1.08 cm^3 02/28/23 1001   Wound Healing % -127 02/28/23 1001   Post-Procedure Length (cm) 1 cm 11/01/22 1024   Post-Procedure Width (cm) 1 cm 11/01/22 1024   Post-Procedure Depth (cm) 0.3 cm 11/01/22 1024   Post-Procedure Surface Area (cm^2) 1 cm^2 11/01/22 1024   Post-Procedure Volume (cm^3) 0.3 cm^3 11/01/22 1024   Wound Assessment Granulation tissue 02/28/23 1001   Drainage Amount Moderate 02/28/23 1001   Drainage Description Serosanguinous 02/28/23 1001   Odor None 02/28/23 1001   Merlyn-wound Assessment Dry/flaky; Excoriated 02/28/23 1001   Margins Defined edges 02/28/23 1001   Wound Thickness Description not for Pressure Injury Full thickness 02/28/23 1001   Number of days: 301       Wound 05/03/22 Ischium Left #3 (Active)   Wound Image   01/24/23 0951   Wound Etiology Pressure Stage 4 02/28/23 1001   Wound Cleansed Cleansed with saline 02/28/23 1001   Dressing/Treatment Moist to dry 01/24/23 0951   Wound Length (cm) 1.2 cm 02/28/23 1001   Wound Width (cm) 5 cm 02/28/23 1001   Wound Depth (cm) 1.7 cm 02/28/23 1001   Wound Surface Area (cm^2) 6 cm^2 02/28/23 1001   Change in Wound Size % (l*w) 85.11 02/28/23 1001   Wound Volume (cm^3) 10.2 cm^3 02/28/23 1001   Wound Healing % 37 02/28/23 1001   Post-Procedure Length (cm) 1 cm 11/01/22 1024   Post-Procedure Width (cm) 2 cm 11/01/22 1024   Post-Procedure Depth (cm) 0.3 cm 11/01/22 1024   Post-Procedure Surface Area (cm^2) 2 cm^2 11/01/22 1024   Post-Procedure Volume (cm^3) 0.6 cm^3 11/01/22 1024   Distance Tunneling (cm) 3 cm 02/14/23 0947   Tunneling Position ___ O'Clock 1500 02/14/23 0947   Undermining Starts ___ O'Clock 1700 02/14/23 0947   Undermining Ends___ O'Clock 1200 12/27/22 1056   Undermining Maxium Distance (cm) 5.8 12/27/22 1056   Wound Assessment Pink/red 02/28/23 1001   Drainage Amount Moderate 02/28/23 1001   Drainage Description Sanguinous 02/28/23 1001   Odor None 02/28/23 1001   Merlyn-wound Assessment Intact 02/28/23 1001   Margins Defined edges 02/28/23 1001   Wound Thickness Description not for Pressure Injury Full thickness 02/28/23 1001   Number of days: 301     42-year-old female with MS who is seen in follow-up for a stage IV sacral ulceration and a stage IV left ischial ulceration.  There have been issues with keeping a seal on the wound VAC.  The skin surrounding the sacral ulcer is irritated.  We will discontinue the wound VAC and change to wet-to-dry dressing changes daily.    Plan:     Continue antipressure measurements and dietary supplements.  Follow-up in the wound center in 2 weeks.    Treatment Plan          Written Patient Discharge Instructions Given            Electronically signed by Tony Huff MD on 2/28/2023 at 10:27 AM

## 2023-02-28 NOTE — PLAN OF CARE
Discharge instructions given. Patient verbalized understanding. Return to [facility] in 2 week(s).

## 2023-03-08 ENCOUNTER — TELEPHONE (OUTPATIENT)
Dept: PHARMACY | Age: 43
End: 2023-03-08

## 2023-03-08 NOTE — TELEPHONE ENCOUNTER
Patient called and LVM asking for dosing instructions and to r/s appt to RTC since missed appt. Returned call to patient and LVM asking for return call.

## 2023-03-08 NOTE — TELEPHONE ENCOUNTER
Chau Serna returned call to clinic. R/s to RTC tomorrow. Advised for pt to take 5 mg tonight. She asked for clinic to contact [facility] to provide dosing instructions. Called and spoke to [facility]. She states that she filled pillbox per AVS until yesterday. Since she missed appt yesterday and had not dosing instructions, she missed dose yesterday (3/7). Advised for patient to take 5 mg tonight (3/8) and pt scheduled to RTC tomorrow.         For Pharmacy Admin Tracking Only    Intervention Detail: Dose Adjustment: 1, reason: Therapy Optimization  Total # of Interventions Recommended: 1  Total # of Interventions Accepted: 1  Time Spent (min): 15

## 2023-03-09 ENCOUNTER — ANTI-COAG VISIT (OUTPATIENT)
Dept: PHARMACY | Age: 43
End: 2023-03-09
Payer: MEDICAID

## 2023-03-09 DIAGNOSIS — Z29.9 DVT PROPHYLAXIS: Primary | ICD-10-CM

## 2023-03-09 DIAGNOSIS — Z79.01 LONG TERM (CURRENT) USE OF ANTICOAGULANTS: ICD-10-CM

## 2023-03-09 DIAGNOSIS — I26.09 PULMONARY EMBOLISM WITH ACUTE COR PULMONALE, UNSPECIFIED CHRONICITY, UNSPECIFIED PULMONARY EMBOLISM TYPE (HCC): ICD-10-CM

## 2023-03-09 LAB — INTERNATIONAL NORMALIZATION RATIO, POC: 3.6

## 2023-03-09 PROCEDURE — 85610 PROTHROMBIN TIME: CPT

## 2023-03-09 PROCEDURE — 99212 OFFICE O/P EST SF 10 MIN: CPT

## 2023-03-09 NOTE — PROGRESS NOTES
Ms. Ninoska Bradley is a 38 y.o. y/o female with history of DVT, PE in about 2010. She presents today for anticoagulation monitoring and adjustment. Pertinent PMH: DVT assoc with PICC line  Hx of multiple sclerosis,     Patient Reported Findings:  Yes     No  []   [x]       Patient verifies current dosing regimen as listed---> does not know dose, but takes what RN gives her but then states that she might have taken 5 mg daily but took old dose. Does not know dose, uses AVS but AVS ran out last week so she is unsure after that --> states that aide followed avs for doses --> unable to verify --> unable to state dose---> States did everything she was supposed to do --> has not taken warfarin since last week---> states followed AVS   []   [x]       S/S bleeding/bruising/swelling/SOB -denies   []   [x]       Blood in urine or stool - denies   [x]   []       Procedures scheduled in the future at this time  ---> having wound procedure on 12/6, needs to hold warfarin for 5 days and ridge with lovenox. Also having botox inj, unsure if needs to hold warfarin (last year when had botox inj held 5 days and bridged with lovenox). Pt states she has an appt today to discuss procedure since having wound procedure week prior --> pt unsure when she resumed warfarin after procedure. Called and spoke to prasanth HEWITT, she believes pt resumed warfarin on 12/6 and does not see any missing doses. Botox procedure r/s for 1/25/23 --> states she forgot about botox inj 1/25 and did not hold warfarin so will r/s   []   [x]       Missed Dose  - Tuesday   []   [x]       Extra Dose - denies  [x]   []       Change in medications-using perc and tylenol prn for pain with new braces- states this will continue---> takes tylenol prn ---> no more tylenol, no other changes in meds --> started MVI flinstones (contain 60 mcg of vit k).  No abx ---> on cipro x 10 d (started 2/1) --> taking 1g tylenol daily for the last week, still taking cipro d/t missed doses, taking sea rivero supplement---> no abx, more pain meds, no other changes   []   [x]       Change in health/diet/appetite Normal vegetable is about twice a week and generally a low vitamin K. When she has broccoli it would be in a stir truong. May have a salad only once a month.---> had vegetables, no significant vit k ---> had greens twice since last visit. States that she had done a detox with tea (haven and turmeric tea)  had greens 2-3 times in the past week, no NVD --> had salad, greens and cabbage this week --> unsure if she had any vit k---> had vit k multiple times in the past week --> had salad yesterday ---> some green beans and zucchini---> eating more greens, no boost/ensure/V8/liver, no NVD --> returned to eating 1 spoonful of sea rivero every day, plans to continue (does not have vit k) --> no changes, no vit k --> lower appetite overall d/t stomach issues, lots of diarrhea per patient, no NV. Getting better now.---> less greens, cranberry juice, diarrhea last week, resolved now  []   [x]       Change in alcohol use adamant that she did not drink alcohol while in [place] --> states now that she had one glass of alcohol in [facility]. none recently --> denies---> had a large mixed drink in last week---> denies    []   [x]       Change in activity  []   [x]       Hospital admission-   []   [x]       Emergency department visit- 10/26- ER, cipro, INR 2.0      11/21- for back pain. INR 1, was given 10 mg of warfarin that day prior to leaving. Was prescribed macrobid x 7 d.   []   [x]       Other complaints   Clinical Outcomes:  Yes     No  []   [x]       Major bleeding event  []   [x]       Thromboembolic event    Duration of warfarin therapy: Indefinitely  INR Range:  2.0-3.0     Continues to miss apts and miss doses. Pt rarely offers up any information to determine cause of INR fluctuations. Angela Byrne, MARCELINA fills [place]. Will need to call and provide instructions after apts [phone]. Pt presents with pillbox. There are multiple day/time slots that are still full and others that aren't. Pt adamant she never misses doses but was angry when questioned why doses were still in boxes sporadically stating those dont have anything to do with warfarin and to mind business. Likely pt missing doses of warfarin and not notifying clinic.  ---> patient did not bring pillbox. 2/28-Called Cynthia to discuss, Ivon Curry states that patient states that the clinic was supposed to fill it and Ivon Curry wasn't supposed to touch it. Ivon Curry tried to fill it on 2/21 after missing her apt but she states that the patient was supposed to call us since she had missed the apt and was not given instructions dt missing the apt and not knowing what the patient should take since her numbers have been fluctuating. Ivon Curry states that patient told her that she was not supposed to take anything until RTC the following week. Explained that in the future, she can call and speak with us so that we can give instructions so patient is taking warfarin and not without it. Missed 3/7, took 5mg 3/8    INR is 3.6 today   Pt missed a dose Tuesday, took 5mg yesterday, states Cynthia followed AVS, has been taking more pain meds recently. Called Cynthia to discuss but filled pillbox for her- Ivon Curry will check it tonight when she goes to make sure nothing shifted in the car. Hold tonight then continue to take 2.5 mg on Mon, Wed and Fri and 3.75 mg all other days of the week. Encouraged to maintain a consistency of vegetables/salads.    Recheck INR on 1 week, 3/16  Asked pt to bring pillbox and warfarin to next appt to fill box.  ---> brought and filled     **consent form signed 1/9/23    Referring PCP is Dr. Antoinette Anton results  INR (no units)   Date Value   12/06/2022 0.95   06/17/2022 5.12 (HH)   10/05/2020 8.93 (HH)   09/19/2020 3.22 (H)     INR,(POC) (no units)   Date Value   03/09/2023 3.6   02/28/2023 1.1   02/14/2023 3.5   02/06/2023 2.2   For Pharmacy Admin Tracking Only    Intervention Detail: Adherence Monitorin and Dose Adjustment: 1, reason: Therapy Optimization  Total # of Interventions Recommended: 2  Total # of Interventions Accepted: 2  Time Spent (min): 15

## 2023-03-10 ENCOUNTER — TELEPHONE (OUTPATIENT)
Dept: PHARMACY | Age: 43
End: 2023-03-10

## 2023-03-10 NOTE — TELEPHONE ENCOUNTER
Pt states that she forgot to tell us she has been taking some Pyridium, explained no significant interaction.  Confirmed that Ivan Knight came out last night to check pillbox with AVS.     Angelina Bettencourt, PharmD, [address]    Total # of Interventions Recommended: 0  Total # of Interventions Accepted: 0  Time Spent (min): 15

## 2023-03-17 ENCOUNTER — TELEPHONE (OUTPATIENT)
Dept: PHARMACY | Age: 43
End: 2023-03-17

## 2023-03-17 NOTE — TELEPHONE ENCOUNTER
Called patient to reschedule. She declined to reschedule at this time, states she will call back this afternoon or Monday to reschedule. Requests [facility] contact [facility] with dosing instructions.

## 2023-03-17 NOTE — TELEPHONE ENCOUNTER
Eddie gardner. Notified to follow last AVS dosing instructions since unclear what INR is, 2.5 mg on Mon, Wed and Fri and 3.75 mg all other days of the week.       For Pharmacy Admin Tracking Only    Intervention Detail: Dose Adjustment: 1, reason: Therapy Optimization  Total # of Interventions Recommended: 1  Total # of Interventions Accepted: 1  Time Spent (min): 10

## 2023-03-27 ENCOUNTER — TELEPHONE (OUTPATIENT)
Dept: PHARMACY | Age: 43
End: 2023-03-27

## 2023-03-27 NOTE — TELEPHONE ENCOUNTER
----- Message from Morris Garcia sent at 3/27/2023 10:20 AM EDT -----  Regarding: INR  Contact: patient  Patient was released from [facility] yesterday. States she has a 1.4 INR. She had to have vitamin K due to supertheraputic INR (18). Patient has an appt with clinic on 9/28.

## 2023-03-27 NOTE — DISCHARGE INSTRUCTIONS
[address], [address] [facility]  Telephone: [phone]     Discharge Instructions     Important reminders:     **If you have any signs and symptoms of illness (Cough, fever, congestion, nausea, vomiting, diarrhea, etc.) please call the wound care center prior to your appointment. 1. Increase Protein intake for optimal wound healing  2. No added salt to reduce any swelling  3. If diabetic, maintain good glucose control  4. If you smoke, smoking prohibits wound healing, we ask that you refrain from smoking. 5. When taking antibiotics take the entire prescription as ordered. Do not stop taking until medication is all gone unless otherwise instructed. 6. Exercise as tolerated. 7. Keep weight off wounds and reposition every 2 hours if applicable. 8. If wound(s) is on your lower extremity, elevate legs to the level of the heart or above for 30 minutes 4-5 times a day and/or when sitting. Avoid standing for long periods of time. 9. Do not get wounds wet in bath or shower unless otherwise instructed by your physician. If your wound is on your foot or leg, you may purchase a cast bag. Please ask at the pharmacy. If Vascular testing is ordered, please call [address] [place] [address] [phone]) to schedule. Vascular tests ordered by Wound Care Physicians may take up to 2 hours to complete. Please keep that in mind when scheduling. If Vascular testing is scheduled, please bring supplies to replace your dressing after testing is done. The vascular department does not stock supplies. Wound: Sacrum, Left ischium     With each dressing change, rinse wounds with 0.9% Saline. (May use wound wash or soft contact solution. Both can be purchased at a local drug store). If unable to obtain saline, may use a gentle soap and water. Dressing care: You will need to ask your Primary Care Physician for PT/OT orders.  Sacrum, Left ischium- wet to dry, dry dressing-

## 2023-03-27 NOTE — TELEPHONE ENCOUNTER
in hospital 3/23-3/26 for UTI. was d/c on cefpodoxime, calcium benadryl, vit d, metamucil and potassium. INR 18 on admit. Was given vit k 10 mg.   3/24- 1 mg warfarin  3/25- 3 mg warfarin  3/26- 5 mg warfarin      Called and spoke to patient. Again reiterated concerns with compliance as INR 18 with no changes per patient is highly unlikely. Concerned for significant fluctuations in INR and missed appointments. Advised for patient to speak with [facility] and discuss any possible changes that might be causing INR to fluctuate as clinic unable to determine therapeutic dose for patient.        For Pharmacy Admin Tracking Only    Intervention Detail: Adherence Monitorin  Total # of Interventions Recommended: 1  Total # of Interventions Accepted: 1  Time Spent (min): 15

## 2023-03-28 ENCOUNTER — HOSPITAL ENCOUNTER (OUTPATIENT)
Dept: WOUND CARE | Age: 43
Discharge: HOME | End: 2023-03-28
Payer: MEDICAID

## 2023-03-28 ENCOUNTER — ANTI-COAG VISIT (OUTPATIENT)
Dept: PHARMACY | Age: 43
End: 2023-03-28
Payer: MEDICAID

## 2023-03-28 VITALS — RESPIRATION RATE: 20 BRPM | TEMPERATURE: 96.8 F | HEART RATE: 88 BPM

## 2023-03-28 DIAGNOSIS — I26.09 OTHER ACUTE PULMONARY EMBOLISM WITH ACUTE COR PULMONALE (HCC): ICD-10-CM

## 2023-03-28 DIAGNOSIS — L89.154 SACRAL DECUBITUS ULCER, STAGE IV (HCC): Primary | ICD-10-CM

## 2023-03-28 DIAGNOSIS — Z79.899 DVT PROPHYLAXIS: Primary | ICD-10-CM

## 2023-03-28 DIAGNOSIS — Z79.01 LONG TERM (CURRENT) USE OF ANTICOAGULANTS: ICD-10-CM

## 2023-03-28 LAB — INTERNATIONAL NORMALIZATION RATIO, POC: 1.3

## 2023-03-28 PROCEDURE — 11042 DBRDMT SUBQ TIS 1ST 20SQCM/<: CPT | Performed by: SURGERY

## 2023-03-28 PROCEDURE — 85610 PROTHROMBIN TIME: CPT

## 2023-03-28 PROCEDURE — 11042 DBRDMT SUBQ TIS 1ST 20SQCM/<: CPT

## 2023-03-28 PROCEDURE — 99212 OFFICE O/P EST SF 10 MIN: CPT

## 2023-03-28 RX ORDER — BETAMETHASONE DIPROPIONATE 0.05 %
OINTMENT (GRAM) TOPICAL ONCE
OUTPATIENT
Start: 2023-03-28 | End: 2023-03-28

## 2023-03-28 RX ORDER — LIDOCAINE 40 MG/G
CREAM TOPICAL ONCE
OUTPATIENT
Start: 2023-03-28 | End: 2023-03-28

## 2023-03-28 RX ORDER — LIDOCAINE 50 MG/G
OINTMENT TOPICAL ONCE
OUTPATIENT
Start: 2023-03-28 | End: 2023-03-28

## 2023-03-28 RX ORDER — LIDOCAINE HYDROCHLORIDE 20 MG/ML
JELLY TOPICAL ONCE
OUTPATIENT
Start: 2023-03-28 | End: 2023-03-28

## 2023-03-28 RX ORDER — GENTAMICIN SULFATE 1 MG/G
OINTMENT TOPICAL ONCE
OUTPATIENT
Start: 2023-03-28 | End: 2023-03-28

## 2023-03-28 RX ORDER — BACITRACIN ZINC AND POLYMYXIN B SULFATE 500; 1000 [USP'U]/G; [USP'U]/G
OINTMENT TOPICAL ONCE
OUTPATIENT
Start: 2023-03-28 | End: 2023-03-28

## 2023-03-28 RX ORDER — CLOBETASOL PROPIONATE 0.5 MG/G
OINTMENT TOPICAL ONCE
OUTPATIENT
Start: 2023-03-28 | End: 2023-03-28

## 2023-03-28 RX ORDER — LIDOCAINE HYDROCHLORIDE 40 MG/ML
SOLUTION TOPICAL ONCE
OUTPATIENT
Start: 2023-03-28 | End: 2023-03-28

## 2023-03-28 RX ORDER — BACITRACIN, NEOMYCIN, POLYMYXIN B 400; 3.5; 5 [USP'U]/G; MG/G; [USP'U]/G
OINTMENT TOPICAL ONCE
OUTPATIENT
Start: 2023-03-28 | End: 2023-03-28

## 2023-03-28 RX ORDER — LIDOCAINE 40 MG/G
CREAM TOPICAL ONCE
Status: DISCONTINUED | OUTPATIENT
Start: 2023-03-28 | End: 2023-03-29 | Stop reason: HOSPADM

## 2023-03-28 RX ORDER — GINSENG 100 MG
CAPSULE ORAL ONCE
OUTPATIENT
Start: 2023-03-28 | End: 2023-03-28

## 2023-03-28 NOTE — PLAN OF CARE
Discharge instructions given. Patient verbalized understanding. Return to [facility] in 2 week(s).   Called/faxed orders to  [facility]

## 2023-03-28 NOTE — PROGRESS NOTES
Xochilt Chamberlain  AGE: 38 y.o. GENDER: female  : 1980  TODAY'S DATE:  3/28/2023    Chief Complaint   Patient presents with    Wound Check     Sacrum and ischium        HISTORY of PRESENT ILLNESS HPI     Xochilt Chamberlain is a 38 y.o. female who presents today for wound evaluation. History of Wound: Pressure ulcerations  Wound Pain:  moderate  Severity:  5 / 10   Wound Type:  pressure  Modifying Factors:  chronic pressure, decreased mobility, and malnutrition  Associated Signs/Symptoms:  none    Procedure Note    Performed by: Yelena Thao MD    Consent obtained: Yes    Time out taken:  Yes    Pain Control: Anesthetic  Anesthetic: 4% Lidocaine Cream     Debridement:Excisional Debridement    Using curette the wound was sharply debrided    down through and including the removal of subcutaneous tissue.         Devitalized Tissue Debrided:  necrotic/eschar    Pre Debridement Measurements:  Are located in the Wound Documentation Flow Sheet    Wound #: 2     Post  Debridement Measurements:  Wound 22 Sacrum #2 (Active)   Wound Etiology Pressure Stage 4 23 1036   Wound Cleansed Cleansed with saline 23 1036   Wound Length (cm) 3 cm 23 1036   Wound Width (cm) 4 cm 23 1036   Wound Depth (cm) 2 cm 23 1036   Wound Surface Area (cm^2) 12 cm^2 23 1036   Change in Wound Size % (l*w) -908.4 23 1036   Wound Volume (cm^3) 24 cm^3 23 1036   Wound Healing % -4942 23 1036   Post-Procedure Length (cm) 3 cm 23 1054   Post-Procedure Width (cm) 4 cm 23 1054   Post-Procedure Depth (cm) 2 cm 23 1054   Post-Procedure Surface Area (cm^2) 12 cm^2 23 1054   Post-Procedure Volume (cm^3) 24 cm^3 23 1054   Wound Assessment Bleeding 23 1054   Drainage Amount Moderate 23 1054   Drainage Description Serosanguinous 23 1036   Odor None 23 1036   [place]-wound Assessment Maceration 23 1036   Margins Defined edges 23 1036
[x] Hypofix skin sensitive tape 4\"  [x] Saline  [] Skin Prep   [] Adhesive Remover   [x] Cotton Tip Applicators  [] Tubular Stocking   [] Size E  [] Size G  [] Other:    Patient currently being seen by Home Health: [] Yes   [x] No    Quantity of days dispensed:  []15  [x]30  []60  []90 Days    Order valid for 90 days    Provider Information:      PROVIDER'S NAME/NPI  Lillian Del Valle MD NPI: [id number]   I give permission to coordinate the care for this patient

## 2023-03-28 NOTE — PROGRESS NOTES
that day prior to leaving. Was prescribed macrobid x 7 d.   []   [x]       Other complaints   Clinical Outcomes:  Yes     No  []   [x]       Major bleeding event  []   [x]       Thromboembolic event    Duration of warfarin therapy: Indefinitely  INR Range:  2.0-3.0     Continues to miss apts and miss doses. Pt rarely offers up any information to determine cause of INR fluctuations. Renaldo Brito RN fills medipack. Will need to call and provide instructions after apts [phone]. Pt presents with pillbox. There are multiple day/time slots that are still full and others that aren't. Pt adamant she never misses doses but was angry when questioned why doses were still in boxes sporadically stating those dont have anything to do with warfarin and to mind business. Likely pt missing doses of warfarin and not notifying clinic.  ---> patient did not bring pillbox. 2/28-Called Cynthia to discuss, Renaldo Brito states that patient states that the clinic was supposed to fill it and Renaldo Brito wasn't supposed to touch it. Renaldo Brito tried to fill it on 2/21 after missing her apt but she states that the patient was supposed to call us since she had missed the apt and was not given instructions dt missing the apt and not knowing what the patient should take since her numbers have been fluctuating. Renaldo Brito states that patient told her that she was not supposed to take anything until RTC the following week. Explained that in the future, she can call and speak with us so that we can give instructions so patient is taking warfarin and not without it.      Missed 3/7, took 5mg 3/8    INR is 1.3 today after INR 18 during hospitalization and IV vit k administered  Called Cynthia to provide dosing, LVM  Decrease dose to 2.5 mg on Sun, Mon, Wed and Fri and 3.75 mg all other days of the week (5.6%)  Pt states she is going to have RN write down what dose she takes and when then bring paper back to clinic to verify doses patient is taking   Encouraged to maintain a

## 2023-04-27 ENCOUNTER — ANTI-COAG VISIT (OUTPATIENT)
Dept: PHARMACY | Age: 43
End: 2023-04-27

## 2023-04-27 PROBLEM — Z79.01 LONG TERM (CURRENT) USE OF ANTICOAGULANTS: Status: RESOLVED | Noted: 2019-12-17 | Resolved: 2023-04-27

## 2024-10-25 NOTE — TELEPHONE ENCOUNTER
----- Message from Darren Dukes sent at 3/20/2020  3:12 PM EDT -----  Regarding: Needs decongestant advice  Contact: Patient  Needs decongestant advice.  [phone]
ambulatory

## (undated) DEVICE — STERILE POLYISOPRENE POWDER-FREE SURGICAL GLOVES: Brand: PROTEXIS

## (undated) DEVICE — INTENDED FOR TISSUE SEPARATION, AND OTHER PROCEDURES THAT REQUIRE A SHARP SURGICAL BLADE TO PUNCTURE OR CUT.: Brand: BARD-PARKER ® STAINLESS STEEL BLADES

## (undated) DEVICE — CRADLE POS 3X5X24IN RASPBERRY ARM PRONE FOAM DISP

## (undated) DEVICE — SHEET,DRAPE,40X58,STERILE: Brand: MEDLINE

## (undated) DEVICE — WET SKIN PREP TRAY: Brand: MEDLINE INDUSTRIES, INC.

## (undated) DEVICE — ELECTRODE PT RET AD L9FT HI MOIST COND ADH HYDRGEL CORDED

## (undated) DEVICE — SHEET,DRAPE,53X77,STERILE: Brand: MEDLINE

## (undated) DEVICE — SUTURE VCRL + SZ 3-0 L18IN ABSRB UD SH 1/2 CIR TAPERCUT NDL VCP864D

## (undated) DEVICE — PENCIL ELECTROSURG 10FT CORD BTN E2516H

## (undated) DEVICE — BLADE ES ELASTOMERIC COAT INSUL DURABLE BEND UPTO 90DEG

## (undated) DEVICE — GAUZE,SPONGE,4"X4",8PLY,STRL,LF,10/TRAY: Brand: MEDLINE

## (undated) DEVICE — SOLUTION IRRIG 1000ML 0.9% SOD CHL USP POUR PLAS BTL

## (undated) DEVICE — BANDAGE,GAUZE,BULKEE II,4.5"X4.1YD,STRL: Brand: MEDLINE

## (undated) DEVICE — DRAPE ADOLESCENT  LAPAROTOMY

## (undated) DEVICE — PAD,ABDOMINAL,8"X10",ST,LF: Brand: MEDLINE

## (undated) DEVICE — TOWEL,OR,DSP,ST,BLUE,DLX,10/PK,8PK/CS: Brand: MEDLINE

## (undated) DEVICE — MAJOR SET UP PK

## (undated) DEVICE — MERCY FAIRFIELD TURNOVER KIT: Brand: MEDLINE INDUSTRIES, INC.

## (undated) DEVICE — SYRINGE, LUER LOCK, 10ML: Brand: MEDLINE

## (undated) DEVICE — CURETTE SURG 5MM GRN S STL DERM SYMMETRICALLY GRND BVL EDGE

## (undated) DEVICE — COVER LT HNDL BLU PLAS